# Patient Record
Sex: FEMALE | Race: OTHER | HISPANIC OR LATINO | URBAN - METROPOLITAN AREA
[De-identification: names, ages, dates, MRNs, and addresses within clinical notes are randomized per-mention and may not be internally consistent; named-entity substitution may affect disease eponyms.]

---

## 2017-04-21 ENCOUNTER — INPATIENT (INPATIENT)
Facility: HOSPITAL | Age: 72
LOS: 2 days | Discharge: HOME | DRG: 313 | End: 2017-04-24
Attending: INTERNAL MEDICINE | Admitting: INTERNAL MEDICINE
Payer: COMMERCIAL

## 2017-04-21 VITALS
WEIGHT: 134.92 LBS | OXYGEN SATURATION: 100 % | RESPIRATION RATE: 18 BRPM | DIASTOLIC BLOOD PRESSURE: 64 MMHG | HEIGHT: 63 IN | SYSTOLIC BLOOD PRESSURE: 142 MMHG | HEART RATE: 99 BPM | TEMPERATURE: 98 F

## 2017-04-21 DIAGNOSIS — I24.9 ACUTE ISCHEMIC HEART DISEASE, UNSPECIFIED: ICD-10-CM

## 2017-04-21 DIAGNOSIS — R07.9 CHEST PAIN, UNSPECIFIED: ICD-10-CM

## 2017-04-21 DIAGNOSIS — F32.9 MAJOR DEPRESSIVE DISORDER, SINGLE EPISODE, UNSPECIFIED: ICD-10-CM

## 2017-04-21 DIAGNOSIS — I10 ESSENTIAL (PRIMARY) HYPERTENSION: ICD-10-CM

## 2017-04-21 DIAGNOSIS — E03.9 HYPOTHYROIDISM, UNSPECIFIED: ICD-10-CM

## 2017-04-21 DIAGNOSIS — Z29.9 ENCOUNTER FOR PROPHYLACTIC MEASURES, UNSPECIFIED: ICD-10-CM

## 2017-04-21 DIAGNOSIS — E11.9 TYPE 2 DIABETES MELLITUS WITHOUT COMPLICATIONS: ICD-10-CM

## 2017-04-21 LAB
ALBUMIN SERPL ELPH-MCNC: 3.5 G/DL — SIGNIFICANT CHANGE UP (ref 3.5–5)
ALP SERPL-CCNC: 115 U/L — SIGNIFICANT CHANGE UP (ref 40–120)
ALT FLD-CCNC: 27 U/L DA — SIGNIFICANT CHANGE UP (ref 10–60)
ANION GAP SERPL CALC-SCNC: 8 MMOL/L — SIGNIFICANT CHANGE UP (ref 5–17)
APTT BLD: 31.9 SEC — SIGNIFICANT CHANGE UP (ref 27.5–37.4)
AST SERPL-CCNC: 21 U/L — SIGNIFICANT CHANGE UP (ref 10–40)
BASOPHILS # BLD AUTO: 0.1 K/UL — SIGNIFICANT CHANGE UP (ref 0–0.2)
BASOPHILS NFR BLD AUTO: 1.7 % — SIGNIFICANT CHANGE UP (ref 0–2)
BILIRUB SERPL-MCNC: 0.4 MG/DL — SIGNIFICANT CHANGE UP (ref 0.2–1.2)
BUN SERPL-MCNC: 13 MG/DL — SIGNIFICANT CHANGE UP (ref 7–18)
CALCIUM SERPL-MCNC: 9 MG/DL — SIGNIFICANT CHANGE UP (ref 8.4–10.5)
CHLORIDE SERPL-SCNC: 105 MMOL/L — SIGNIFICANT CHANGE UP (ref 96–108)
CK MB BLD-MCNC: 1.4 % — SIGNIFICANT CHANGE UP (ref 0–3.5)
CK MB BLD-MCNC: 1.8 % — SIGNIFICANT CHANGE UP (ref 0–3.5)
CK MB CFR SERPL CALC: 1.8 NG/ML — SIGNIFICANT CHANGE UP (ref 0–3.6)
CK MB CFR SERPL CALC: 1.8 NG/ML — SIGNIFICANT CHANGE UP (ref 0–3.6)
CK SERPL-CCNC: 133 U/L — SIGNIFICANT CHANGE UP (ref 21–215)
CK SERPL-CCNC: 98 U/L — SIGNIFICANT CHANGE UP (ref 21–215)
CO2 SERPL-SCNC: 27 MMOL/L — SIGNIFICANT CHANGE UP (ref 22–31)
CREAT SERPL-MCNC: 0.99 MG/DL — SIGNIFICANT CHANGE UP (ref 0.5–1.3)
EOSINOPHIL # BLD AUTO: 0.2 K/UL — SIGNIFICANT CHANGE UP (ref 0–0.5)
EOSINOPHIL NFR BLD AUTO: 2.6 % — SIGNIFICANT CHANGE UP (ref 0–6)
GLUCOSE SERPL-MCNC: 189 MG/DL — HIGH (ref 70–99)
HCT VFR BLD CALC: 41.5 % — SIGNIFICANT CHANGE UP (ref 34.5–45)
HGB BLD-MCNC: 13.6 G/DL — SIGNIFICANT CHANGE UP (ref 11.5–15.5)
INR BLD: 0.97 RATIO — SIGNIFICANT CHANGE UP (ref 0.88–1.16)
LYMPHOCYTES # BLD AUTO: 2.2 K/UL — SIGNIFICANT CHANGE UP (ref 1–3.3)
LYMPHOCYTES # BLD AUTO: 25.8 % — SIGNIFICANT CHANGE UP (ref 13–44)
MCHC RBC-ENTMCNC: 28.8 PG — SIGNIFICANT CHANGE UP (ref 27–34)
MCHC RBC-ENTMCNC: 32.8 GM/DL — SIGNIFICANT CHANGE UP (ref 32–36)
MCV RBC AUTO: 87.8 FL — SIGNIFICANT CHANGE UP (ref 80–100)
MONOCYTES # BLD AUTO: 0.5 K/UL — SIGNIFICANT CHANGE UP (ref 0–0.9)
MONOCYTES NFR BLD AUTO: 5.6 % — SIGNIFICANT CHANGE UP (ref 2–14)
NEUTROPHILS # BLD AUTO: 5.4 K/UL — SIGNIFICANT CHANGE UP (ref 1.8–7.4)
NEUTROPHILS NFR BLD AUTO: 64.3 % — SIGNIFICANT CHANGE UP (ref 43–77)
PLATELET # BLD AUTO: 371 K/UL — SIGNIFICANT CHANGE UP (ref 150–400)
POTASSIUM SERPL-MCNC: 3.7 MMOL/L — SIGNIFICANT CHANGE UP (ref 3.5–5.3)
POTASSIUM SERPL-SCNC: 3.7 MMOL/L — SIGNIFICANT CHANGE UP (ref 3.5–5.3)
PROT SERPL-MCNC: 7.3 G/DL — SIGNIFICANT CHANGE UP (ref 6–8.3)
PROTHROM AB SERPL-ACNC: 10.6 SEC — SIGNIFICANT CHANGE UP (ref 9.8–12.7)
RBC # BLD: 4.72 M/UL — SIGNIFICANT CHANGE UP (ref 3.8–5.2)
RBC # FLD: 11.5 % — SIGNIFICANT CHANGE UP (ref 10.3–14.5)
SODIUM SERPL-SCNC: 140 MMOL/L — SIGNIFICANT CHANGE UP (ref 135–145)
TROPONIN I SERPL-MCNC: <0.015 NG/ML — SIGNIFICANT CHANGE UP (ref 0–0.04)
TROPONIN I SERPL-MCNC: <0.015 NG/ML — SIGNIFICANT CHANGE UP (ref 0–0.04)
WBC # BLD: 8.5 K/UL — SIGNIFICANT CHANGE UP (ref 3.8–10.5)
WBC # FLD AUTO: 8.5 K/UL — SIGNIFICANT CHANGE UP (ref 3.8–10.5)

## 2017-04-21 PROCEDURE — 71020: CPT | Mod: 26

## 2017-04-21 PROCEDURE — 99285 EMERGENCY DEPT VISIT HI MDM: CPT

## 2017-04-21 RX ORDER — INSULIN LISPRO 100/ML
VIAL (ML) SUBCUTANEOUS
Qty: 0 | Refills: 0 | Status: DISCONTINUED | OUTPATIENT
Start: 2017-04-21 | End: 2017-04-24

## 2017-04-21 RX ORDER — ASPIRIN/CALCIUM CARB/MAGNESIUM 324 MG
81 TABLET ORAL DAILY
Qty: 0 | Refills: 0 | Status: DISCONTINUED | OUTPATIENT
Start: 2017-04-21 | End: 2017-04-24

## 2017-04-21 RX ORDER — AMLODIPINE BESYLATE 2.5 MG/1
5 TABLET ORAL DAILY
Qty: 0 | Refills: 0 | Status: DISCONTINUED | OUTPATIENT
Start: 2017-04-21 | End: 2017-04-24

## 2017-04-21 RX ORDER — DEXTROSE 50 % IN WATER 50 %
1 SYRINGE (ML) INTRAVENOUS ONCE
Qty: 0 | Refills: 0 | Status: DISCONTINUED | OUTPATIENT
Start: 2017-04-21 | End: 2017-04-24

## 2017-04-21 RX ORDER — LEVOTHYROXINE SODIUM 125 MCG
100 TABLET ORAL DAILY
Qty: 0 | Refills: 0 | Status: DISCONTINUED | OUTPATIENT
Start: 2017-04-21 | End: 2017-04-24

## 2017-04-21 RX ORDER — INSULIN GLARGINE 100 [IU]/ML
10 INJECTION, SOLUTION SUBCUTANEOUS AT BEDTIME
Qty: 0 | Refills: 0 | Status: DISCONTINUED | OUTPATIENT
Start: 2017-04-21 | End: 2017-04-23

## 2017-04-21 RX ORDER — DEXTROSE 50 % IN WATER 50 %
25 SYRINGE (ML) INTRAVENOUS ONCE
Qty: 0 | Refills: 0 | Status: DISCONTINUED | OUTPATIENT
Start: 2017-04-21 | End: 2017-04-24

## 2017-04-21 RX ORDER — DEXTROSE 50 % IN WATER 50 %
12.5 SYRINGE (ML) INTRAVENOUS ONCE
Qty: 0 | Refills: 0 | Status: DISCONTINUED | OUTPATIENT
Start: 2017-04-21 | End: 2017-04-24

## 2017-04-21 RX ORDER — ENOXAPARIN SODIUM 100 MG/ML
40 INJECTION SUBCUTANEOUS DAILY
Qty: 0 | Refills: 0 | Status: DISCONTINUED | OUTPATIENT
Start: 2017-04-21 | End: 2017-04-24

## 2017-04-21 RX ORDER — SODIUM CHLORIDE 9 MG/ML
1000 INJECTION, SOLUTION INTRAVENOUS
Qty: 0 | Refills: 0 | Status: DISCONTINUED | OUTPATIENT
Start: 2017-04-21 | End: 2017-04-24

## 2017-04-21 RX ORDER — GLUCAGON INJECTION, SOLUTION 0.5 MG/.1ML
1 INJECTION, SOLUTION SUBCUTANEOUS ONCE
Qty: 0 | Refills: 0 | Status: DISCONTINUED | OUTPATIENT
Start: 2017-04-21 | End: 2017-04-24

## 2017-04-21 RX ORDER — LOSARTAN POTASSIUM 100 MG/1
100 TABLET, FILM COATED ORAL DAILY
Qty: 0 | Refills: 0 | Status: DISCONTINUED | OUTPATIENT
Start: 2017-04-21 | End: 2017-04-24

## 2017-04-21 RX ORDER — ATORVASTATIN CALCIUM 80 MG/1
40 TABLET, FILM COATED ORAL AT BEDTIME
Qty: 0 | Refills: 0 | Status: DISCONTINUED | OUTPATIENT
Start: 2017-04-21 | End: 2017-04-24

## 2017-04-21 RX ORDER — ACETAMINOPHEN 500 MG
650 TABLET ORAL EVERY 6 HOURS
Qty: 0 | Refills: 0 | Status: DISCONTINUED | OUTPATIENT
Start: 2017-04-21 | End: 2017-04-24

## 2017-04-21 RX ORDER — PANTOPRAZOLE SODIUM 20 MG/1
40 TABLET, DELAYED RELEASE ORAL
Qty: 0 | Refills: 0 | Status: DISCONTINUED | OUTPATIENT
Start: 2017-04-21 | End: 2017-04-24

## 2017-04-21 RX ORDER — SERTRALINE 25 MG/1
25 TABLET, FILM COATED ORAL DAILY
Qty: 0 | Refills: 0 | Status: DISCONTINUED | OUTPATIENT
Start: 2017-04-21 | End: 2017-04-24

## 2017-04-21 RX ADMIN — INSULIN GLARGINE 10 UNIT(S): 100 INJECTION, SOLUTION SUBCUTANEOUS at 23:15

## 2017-04-21 RX ADMIN — ATORVASTATIN CALCIUM 40 MILLIGRAM(S): 80 TABLET, FILM COATED ORAL at 23:15

## 2017-04-21 RX ADMIN — Medication 650 MILLIGRAM(S): at 20:56

## 2017-04-21 RX ADMIN — Medication 650 MILLIGRAM(S): at 21:52

## 2017-04-21 RX ADMIN — Medication 81 MILLIGRAM(S): at 12:18

## 2017-04-21 NOTE — H&P ADULT. - ATTENDING COMMENTS
73 Yo female with  PMH of HTN, DM II , hyperlipidemia, hypothyroidism and depression presents to the ED with c/o pain in her left arm since this morning. Pain is in her left hand and it radiates to her left arm, left shoulder and chest. Patient woke up this morning due to pain , 10/10 in intensity, sharp in quality, not relieved or aggravated by anything. Associated with diaphoresis and headache. Patient denies nausea, vomiting or, dyspnea, GERD, recent upper respiratory tract infection or recent aspirin use. Patient had a vasovagal syncope 3 days ago after urinating, denies head trauma. former smoker, former ETOH use. Eats a healthy diet, exercises everyday by walking at least an hour.  Patient's   3 years ago and she has been on Sertraline off and on since then for Depression. Last visit to her Psychiatrist was yesterday  Family history of heart disease in her Father.    pt seen in bed, a+o x3, nad, vitals stable, physical exam reveals no focal motor deficit, clear lungs, regular s1s2, abd soft nd nt bs+, ext no edema. labs and diagnostic test result reviewed.    assessment   --- chest pain,  r/o acs, h/o HTN, DM II , hyperlipidemia, hypothyroidism and depression    plan  --  adm to tele, acs protocol, lopressor, aspirin, statin, cont preadmit home meds, gi and dvt profilaxis  cbc, bmp, mg, phos, lipid, tsh, ce q8 x3    echo    cardio cons

## 2017-04-21 NOTE — H&P ADULT. - GASTROINTESTINAL DETAILS
normal/no rebound tenderness/soft/nontender/no guarding/no distention/bowel sounds normal/no bruit/no masses palpable/no rigidity/no organomegaly

## 2017-04-21 NOTE — ED ADULT NURSE NOTE - OBJECTIVE STATEMENT
Patient complains of left arm pain, rates pain 10/10, chest pain 3/10. Patient is non diaphoretic, breathing easy and unlabored, speaking in full sentences, no use of accessory muscles. Moving all extremities. EKG completed.

## 2017-04-21 NOTE — ED ADULT NURSE REASSESSMENT NOTE - NS ED NURSE REASSESS COMMENT FT1
Patient tolerated meal well. Family at bedside. Remains alert, responsive, resting in stretcher, on tele box J. Awaiting inpatient bed.

## 2017-04-21 NOTE — H&P ADULT. - PROBLEM SELECTOR PLAN 3
c/w home med amlodipine 5 mg daily   c/w home med losartan 100 mg daily   c/w DASH diet  Goal SBP less than 150/90 mmhg

## 2017-04-21 NOTE — H&P ADULT. - HISTORY OF PRESENT ILLNESS
73 Yo female with  PMH of HTN, DM II , hyperlipidemia, hypothyroidism and depression presents to the ED with c/o pain in her left arm since this morning. Pain is in her left hand and it radiates to her left arm, left shoulder and chest. Patient woke up this morning due to pain , 10/10 in intensity, sharp in quality, not relieved or aggravated by anything. Associated with diaphoresis and headache.Patient denies nausea, vomiting or, dyspnea, GERD, recent upper respiratory tract infection or recent aspirin use. Patient had a vasovagal syncope 3 days ago after urinating, denies head trauma. former smoker, former ETOH use. Eats a healthy diet, exercises everyday by walking at least an hour.  Patient's   3 years ago and she has been on Sertraline off and on since then for Depression. Last visit to her Psychiatrist was yesterday  Family history of heart disease in her Father. 73 Yo female with  PMH of HTN, DM II , hyperlipidemia, hypothyroidism and depression presents to the ED with c/o pain in her left arm since this morning. Pain is in her left hand and it radiates to her left arm, left shoulder and chest. Patient woke up this morning due to pain , 10/10 in intensity, sharp in quality, not relieved or aggravated by anything. Associated with diaphoresis and headache. Patient denies nausea, vomiting or, dyspnea, GERD, recent upper respiratory tract infection or recent aspirin use. Patient had a vasovagal syncope 3 days ago after urinating, denies head trauma. former smoker, former ETOH use. Eats a healthy diet, exercises everyday by walking at least an hour.  Patient's   3 years ago and she has been on Sertraline off and on since then for Depression. Last visit to her Psychiatrist was yesterday  Family history of heart disease in her Father.

## 2017-04-21 NOTE — H&P ADULT. - PROBLEM SELECTOR PLAN 4
Pt takes metformin and glimepiride at home   holding PO DM meds   c/w HSS + lantus 10 u while holding PO DM meds  f/u HbA1c

## 2017-04-21 NOTE — H&P ADULT. - NEUROLOGICAL DETAILS
sensation intact/alert and oriented x 3/responds to verbal commands/cranial nerves intact/no spontaneous movement/responds to pain/deep reflexes intact/superficial reflexes intact/normal strength

## 2017-04-21 NOTE — H&P ADULT. - RS GEN PE MLT RESP DETAILS PC
clear to auscultation bilaterally/no subcutaneous emphysema/respirations non-labored/normal/good air movement/airway patent/no rales/no intercostal retractions/breath sounds equal/no wheezes/no chest wall tenderness/no rhonchi

## 2017-04-21 NOTE — PATIENT PROFILE ADULT. - LANGUAGE ASSISTANCE NEEDED
No-Patient/Caregiver offered and refused free interpretation services./pt understands and able to make needs known in English

## 2017-04-21 NOTE — H&P ADULT. - ASSESSMENT
71 Yo female with  PMH of HTN, DM II , hyperlipidemia, hypothyroidism and depression presents to the ED with c/o pain in her left arm since this morning. Pain is in her left hand and it radiates to her left arm, left shoulder and chest. Patient woke up this morning due to pain , 10/10 in intensity, sharp in quality, not relieved or aggravated by anything. Associated with diaphoresis and headache. Patient denies nausea, vomiting or, dyspnea, GERD, recent upper respiratory tract infection or recent aspirin use. Patient had a vasovagal syncope 3 days ago after urinating, denies head trauma. former smoker, former ETOH use. Eats a healthy diet, exercises everyday by walking at least an hour.  Patient's   3 years ago and she has been on Sertraline off and on since then for Depression. Last visit to her Psychiatrist was yesterday  Family history of heart disease in her Father.

## 2017-04-21 NOTE — ED PROVIDER NOTE - OBJECTIVE STATEMENT
+left sternal chest apain with radiation to left shoulder started at 7am. No shortness of breath, no fever, no vomiting.

## 2017-04-21 NOTE — ED PROVIDER NOTE - MEDICAL DECISION MAKING DETAILS
Cardiac risk factors: age, HTN, DM . MAR and Dr. Mayen endorsed. Pt agrees with admission. I had a detailed discussion with the patient and/or guardian regarding the historical points, exam findings, and any diagnostic results supporting the admit diagnosis.

## 2017-04-21 NOTE — H&P ADULT. - PROBLEM SELECTOR PLAN 1
left sided chest pain , non reproducible , no exertional   24 hour tele monitor   trend cardiac enzymes left sided chest pain , non reproducible , no exertional   24 hour tele monitor   trend cardiac enzymes, trop 1 and 2 are negative  CECIL score - 2  CXR - clear , No GERD sx , normotensive , No JVD  f/u TSH, HbA1c , Lipid profile   f/u echo  c/w ASA , Lipitor , start BB after U-tox negative for cocaine , though pt denies of any cocaine use

## 2017-04-22 LAB
24R-OH-CALCIDIOL SERPL-MCNC: 23.1 NG/ML — LOW (ref 30–100)
AMPHET UR-MCNC: NEGATIVE — SIGNIFICANT CHANGE UP
ANION GAP SERPL CALC-SCNC: 7 MMOL/L — SIGNIFICANT CHANGE UP (ref 5–17)
APPEARANCE UR: CLEAR — SIGNIFICANT CHANGE UP
BARBITURATES UR SCN-MCNC: NEGATIVE — SIGNIFICANT CHANGE UP
BASOPHILS # BLD AUTO: 0.1 K/UL — SIGNIFICANT CHANGE UP (ref 0–0.2)
BASOPHILS NFR BLD AUTO: 1.8 % — SIGNIFICANT CHANGE UP (ref 0–2)
BENZODIAZ UR-MCNC: NEGATIVE — SIGNIFICANT CHANGE UP
BILIRUB UR-MCNC: NEGATIVE — SIGNIFICANT CHANGE UP
BUN SERPL-MCNC: 13 MG/DL — SIGNIFICANT CHANGE UP (ref 7–18)
CALCIUM SERPL-MCNC: 8.7 MG/DL — SIGNIFICANT CHANGE UP (ref 8.4–10.5)
CHLORIDE SERPL-SCNC: 106 MMOL/L — SIGNIFICANT CHANGE UP (ref 96–108)
CHOLEST SERPL-MCNC: 211 MG/DL — HIGH (ref 10–199)
CK MB BLD-MCNC: <1.2 % — SIGNIFICANT CHANGE UP (ref 0–3.5)
CK MB CFR SERPL CALC: <1 NG/ML — SIGNIFICANT CHANGE UP (ref 0–3.6)
CK SERPL-CCNC: 83 U/L — SIGNIFICANT CHANGE UP (ref 21–215)
CO2 SERPL-SCNC: 29 MMOL/L — SIGNIFICANT CHANGE UP (ref 22–31)
COCAINE METAB.OTHER UR-MCNC: NEGATIVE — SIGNIFICANT CHANGE UP
COLOR SPEC: YELLOW — SIGNIFICANT CHANGE UP
CREAT SERPL-MCNC: 0.9 MG/DL — SIGNIFICANT CHANGE UP (ref 0.5–1.3)
DIFF PNL FLD: ABNORMAL
EOSINOPHIL # BLD AUTO: 0.2 K/UL — SIGNIFICANT CHANGE UP (ref 0–0.5)
EOSINOPHIL NFR BLD AUTO: 3.2 % — SIGNIFICANT CHANGE UP (ref 0–6)
FOLATE SERPL-MCNC: 12.2 NG/ML — SIGNIFICANT CHANGE UP (ref 4.8–24.2)
GLUCOSE SERPL-MCNC: 118 MG/DL — HIGH (ref 70–99)
GLUCOSE UR QL: NEGATIVE — SIGNIFICANT CHANGE UP
HBA1C BLD-MCNC: 8.1 % — HIGH (ref 4–5.6)
HBA1C BLD-MCNC: 8.4 % — HIGH (ref 4–5.6)
HCT VFR BLD CALC: 39 % — SIGNIFICANT CHANGE UP (ref 34.5–45)
HDLC SERPL-MCNC: 39 MG/DL — LOW (ref 40–125)
HGB BLD-MCNC: 13.3 G/DL — SIGNIFICANT CHANGE UP (ref 11.5–15.5)
KETONES UR-MCNC: NEGATIVE — SIGNIFICANT CHANGE UP
LEUKOCYTE ESTERASE UR-ACNC: ABNORMAL
LIPID PNL WITH DIRECT LDL SERPL: 144 MG/DL — SIGNIFICANT CHANGE UP
LYMPHOCYTES # BLD AUTO: 2.4 K/UL — SIGNIFICANT CHANGE UP (ref 1–3.3)
LYMPHOCYTES # BLD AUTO: 32.2 % — SIGNIFICANT CHANGE UP (ref 13–44)
MAGNESIUM SERPL-MCNC: 1.7 MG/DL — LOW (ref 1.8–2.4)
MCHC RBC-ENTMCNC: 29.3 PG — SIGNIFICANT CHANGE UP (ref 27–34)
MCHC RBC-ENTMCNC: 34 GM/DL — SIGNIFICANT CHANGE UP (ref 32–36)
MCV RBC AUTO: 86.4 FL — SIGNIFICANT CHANGE UP (ref 80–100)
METHADONE UR-MCNC: NEGATIVE — SIGNIFICANT CHANGE UP
MONOCYTES # BLD AUTO: 0.5 K/UL — SIGNIFICANT CHANGE UP (ref 0–0.9)
MONOCYTES NFR BLD AUTO: 7.3 % — SIGNIFICANT CHANGE UP (ref 2–14)
NEUTROPHILS # BLD AUTO: 4.1 K/UL — SIGNIFICANT CHANGE UP (ref 1.8–7.4)
NEUTROPHILS NFR BLD AUTO: 55.5 % — SIGNIFICANT CHANGE UP (ref 43–77)
NITRITE UR-MCNC: NEGATIVE — SIGNIFICANT CHANGE UP
OPIATES UR-MCNC: NEGATIVE — SIGNIFICANT CHANGE UP
PCP SPEC-MCNC: SIGNIFICANT CHANGE UP
PCP UR-MCNC: NEGATIVE — SIGNIFICANT CHANGE UP
PH UR: 7 — SIGNIFICANT CHANGE UP (ref 5–8)
PHOSPHATE SERPL-MCNC: 3.2 MG/DL — SIGNIFICANT CHANGE UP (ref 2.5–4.5)
PLATELET # BLD AUTO: 335 K/UL — SIGNIFICANT CHANGE UP (ref 150–400)
POTASSIUM SERPL-MCNC: 3.4 MMOL/L — LOW (ref 3.5–5.3)
POTASSIUM SERPL-SCNC: 3.4 MMOL/L — LOW (ref 3.5–5.3)
PROT UR-MCNC: 15
RBC # BLD: 4.52 M/UL — SIGNIFICANT CHANGE UP (ref 3.8–5.2)
RBC # FLD: 11.5 % — SIGNIFICANT CHANGE UP (ref 10.3–14.5)
SODIUM SERPL-SCNC: 142 MMOL/L — SIGNIFICANT CHANGE UP (ref 135–145)
SP GR SPEC: 1.01 — SIGNIFICANT CHANGE UP (ref 1.01–1.02)
THC UR QL: NEGATIVE — SIGNIFICANT CHANGE UP
TOTAL CHOLESTEROL/HDL RATIO MEASUREMENT: 5.4 RATIO — SIGNIFICANT CHANGE UP (ref 3.3–7.1)
TRIGL SERPL-MCNC: 139 MG/DL — SIGNIFICANT CHANGE UP (ref 10–149)
TROPONIN I SERPL-MCNC: <0.015 NG/ML — SIGNIFICANT CHANGE UP (ref 0–0.04)
TSH SERPL-MCNC: 2.14 UU/ML — SIGNIFICANT CHANGE UP (ref 0.34–4.82)
UROBILINOGEN FLD QL: NEGATIVE — SIGNIFICANT CHANGE UP
VIT B12 SERPL-MCNC: 561 PG/ML — SIGNIFICANT CHANGE UP (ref 243–894)
WBC # BLD: 7.4 K/UL — SIGNIFICANT CHANGE UP (ref 3.8–10.5)
WBC # FLD AUTO: 7.4 K/UL — SIGNIFICANT CHANGE UP (ref 3.8–10.5)

## 2017-04-22 RX ORDER — CEFTRIAXONE 500 MG/1
1 INJECTION, POWDER, FOR SOLUTION INTRAMUSCULAR; INTRAVENOUS ONCE
Qty: 0 | Refills: 0 | Status: COMPLETED | OUTPATIENT
Start: 2017-04-22 | End: 2017-04-22

## 2017-04-22 RX ORDER — MAGNESIUM SULFATE 500 MG/ML
1 VIAL (ML) INJECTION ONCE
Qty: 0 | Refills: 0 | Status: COMPLETED | OUTPATIENT
Start: 2017-04-22 | End: 2017-04-22

## 2017-04-22 RX ORDER — METOPROLOL TARTRATE 50 MG
12.5 TABLET ORAL
Qty: 0 | Refills: 0 | Status: DISCONTINUED | OUTPATIENT
Start: 2017-04-22 | End: 2017-04-24

## 2017-04-22 RX ORDER — CEFTRIAXONE 500 MG/1
1 INJECTION, POWDER, FOR SOLUTION INTRAMUSCULAR; INTRAVENOUS EVERY 24 HOURS
Qty: 0 | Refills: 0 | Status: DISCONTINUED | OUTPATIENT
Start: 2017-04-23 | End: 2017-04-24

## 2017-04-22 RX ORDER — CEFTRIAXONE 500 MG/1
INJECTION, POWDER, FOR SOLUTION INTRAMUSCULAR; INTRAVENOUS
Qty: 0 | Refills: 0 | Status: DISCONTINUED | OUTPATIENT
Start: 2017-04-22 | End: 2017-04-24

## 2017-04-22 RX ORDER — POTASSIUM CHLORIDE 20 MEQ
40 PACKET (EA) ORAL
Qty: 0 | Refills: 0 | Status: COMPLETED | OUTPATIENT
Start: 2017-04-22 | End: 2017-04-22

## 2017-04-22 RX ORDER — ZOLPIDEM TARTRATE 10 MG/1
5 TABLET ORAL ONCE
Qty: 0 | Refills: 0 | Status: DISCONTINUED | OUTPATIENT
Start: 2017-04-22 | End: 2017-04-22

## 2017-04-22 RX ADMIN — AMLODIPINE BESYLATE 5 MILLIGRAM(S): 2.5 TABLET ORAL at 06:47

## 2017-04-22 RX ADMIN — Medication 650 MILLIGRAM(S): at 19:44

## 2017-04-22 RX ADMIN — SERTRALINE 25 MILLIGRAM(S): 25 TABLET, FILM COATED ORAL at 13:31

## 2017-04-22 RX ADMIN — PANTOPRAZOLE SODIUM 40 MILLIGRAM(S): 20 TABLET, DELAYED RELEASE ORAL at 06:47

## 2017-04-22 RX ADMIN — ENOXAPARIN SODIUM 40 MILLIGRAM(S): 100 INJECTION SUBCUTANEOUS at 12:37

## 2017-04-22 RX ADMIN — Medication 12.5 MILLIGRAM(S): at 18:12

## 2017-04-22 RX ADMIN — Medication 650 MILLIGRAM(S): at 20:46

## 2017-04-22 RX ADMIN — Medication 40 MILLIEQUIVALENT(S): at 13:31

## 2017-04-22 RX ADMIN — Medication 81 MILLIGRAM(S): at 12:37

## 2017-04-22 RX ADMIN — Medication 1: at 16:49

## 2017-04-22 RX ADMIN — ZOLPIDEM TARTRATE 5 MILLIGRAM(S): 10 TABLET ORAL at 23:02

## 2017-04-22 RX ADMIN — ATORVASTATIN CALCIUM 40 MILLIGRAM(S): 80 TABLET, FILM COATED ORAL at 21:54

## 2017-04-22 RX ADMIN — CEFTRIAXONE 100 GRAM(S): 500 INJECTION, POWDER, FOR SOLUTION INTRAMUSCULAR; INTRAVENOUS at 09:00

## 2017-04-22 RX ADMIN — Medication 100 MICROGRAM(S): at 06:48

## 2017-04-22 RX ADMIN — Medication 2: at 12:39

## 2017-04-22 RX ADMIN — Medication 40 MILLIEQUIVALENT(S): at 09:00

## 2017-04-22 RX ADMIN — INSULIN GLARGINE 10 UNIT(S): 100 INJECTION, SOLUTION SUBCUTANEOUS at 21:54

## 2017-04-22 RX ADMIN — Medication 100 GRAM(S): at 09:00

## 2017-04-22 RX ADMIN — LOSARTAN POTASSIUM 100 MILLIGRAM(S): 100 TABLET, FILM COATED ORAL at 06:47

## 2017-04-23 LAB
ANION GAP SERPL CALC-SCNC: 5 MMOL/L — SIGNIFICANT CHANGE UP (ref 5–17)
BASOPHILS # BLD AUTO: 0.1 K/UL — SIGNIFICANT CHANGE UP (ref 0–0.2)
BASOPHILS NFR BLD AUTO: 1.2 % — SIGNIFICANT CHANGE UP (ref 0–2)
BUN SERPL-MCNC: 16 MG/DL — SIGNIFICANT CHANGE UP (ref 7–18)
CALCIUM SERPL-MCNC: 8.4 MG/DL — SIGNIFICANT CHANGE UP (ref 8.4–10.5)
CHLORIDE SERPL-SCNC: 108 MMOL/L — SIGNIFICANT CHANGE UP (ref 96–108)
CO2 SERPL-SCNC: 29 MMOL/L — SIGNIFICANT CHANGE UP (ref 22–31)
CREAT SERPL-MCNC: 0.96 MG/DL — SIGNIFICANT CHANGE UP (ref 0.5–1.3)
EOSINOPHIL # BLD AUTO: 0.3 K/UL — SIGNIFICANT CHANGE UP (ref 0–0.5)
EOSINOPHIL NFR BLD AUTO: 3.8 % — SIGNIFICANT CHANGE UP (ref 0–6)
GLUCOSE SERPL-MCNC: 143 MG/DL — HIGH (ref 70–99)
HCT VFR BLD CALC: 40.1 % — SIGNIFICANT CHANGE UP (ref 34.5–45)
HGB BLD-MCNC: 13.4 G/DL — SIGNIFICANT CHANGE UP (ref 11.5–15.5)
LYMPHOCYTES # BLD AUTO: 2.2 K/UL — SIGNIFICANT CHANGE UP (ref 1–3.3)
LYMPHOCYTES # BLD AUTO: 29.6 % — SIGNIFICANT CHANGE UP (ref 13–44)
MAGNESIUM SERPL-MCNC: 2 MG/DL — SIGNIFICANT CHANGE UP (ref 1.8–2.4)
MCHC RBC-ENTMCNC: 29.4 PG — SIGNIFICANT CHANGE UP (ref 27–34)
MCHC RBC-ENTMCNC: 33.4 GM/DL — SIGNIFICANT CHANGE UP (ref 32–36)
MCV RBC AUTO: 88.1 FL — SIGNIFICANT CHANGE UP (ref 80–100)
MONOCYTES # BLD AUTO: 0.5 K/UL — SIGNIFICANT CHANGE UP (ref 0–0.9)
MONOCYTES NFR BLD AUTO: 6.7 % — SIGNIFICANT CHANGE UP (ref 2–14)
NEUTROPHILS # BLD AUTO: 4.3 K/UL — SIGNIFICANT CHANGE UP (ref 1.8–7.4)
NEUTROPHILS NFR BLD AUTO: 58.6 % — SIGNIFICANT CHANGE UP (ref 43–77)
PHOSPHATE SERPL-MCNC: 2.9 MG/DL — SIGNIFICANT CHANGE UP (ref 2.5–4.5)
PLATELET # BLD AUTO: 350 K/UL — SIGNIFICANT CHANGE UP (ref 150–400)
POTASSIUM SERPL-MCNC: 4.2 MMOL/L — SIGNIFICANT CHANGE UP (ref 3.5–5.3)
POTASSIUM SERPL-SCNC: 4.2 MMOL/L — SIGNIFICANT CHANGE UP (ref 3.5–5.3)
RBC # BLD: 4.55 M/UL — SIGNIFICANT CHANGE UP (ref 3.8–5.2)
RBC # FLD: 11.8 % — SIGNIFICANT CHANGE UP (ref 10.3–14.5)
SODIUM SERPL-SCNC: 142 MMOL/L — SIGNIFICANT CHANGE UP (ref 135–145)
WBC # BLD: 7.3 K/UL — SIGNIFICANT CHANGE UP (ref 3.8–10.5)
WBC # FLD AUTO: 7.3 K/UL — SIGNIFICANT CHANGE UP (ref 3.8–10.5)

## 2017-04-23 RX ORDER — ASPIRIN/CALCIUM CARB/MAGNESIUM 324 MG
1 TABLET ORAL
Qty: 0 | Refills: 0 | COMMUNITY
Start: 2017-04-23

## 2017-04-23 RX ORDER — ERGOCALCIFEROL 1.25 MG/1
50000 CAPSULE ORAL
Qty: 0 | Refills: 0 | Status: DISCONTINUED | OUTPATIENT
Start: 2017-04-23 | End: 2017-04-24

## 2017-04-23 RX ORDER — ZOLPIDEM TARTRATE 10 MG/1
5 TABLET ORAL ONCE
Qty: 0 | Refills: 0 | Status: DISCONTINUED | OUTPATIENT
Start: 2017-04-23 | End: 2017-04-23

## 2017-04-23 RX ORDER — INSULIN GLARGINE 100 [IU]/ML
12 INJECTION, SOLUTION SUBCUTANEOUS AT BEDTIME
Qty: 0 | Refills: 0 | Status: DISCONTINUED | OUTPATIENT
Start: 2017-04-23 | End: 2017-04-24

## 2017-04-23 RX ORDER — ERGOCALCIFEROL 1.25 MG/1
1 CAPSULE ORAL
Qty: 4 | Refills: 0 | OUTPATIENT
Start: 2017-04-23 | End: 2017-05-23

## 2017-04-23 RX ADMIN — CEFTRIAXONE 100 GRAM(S): 500 INJECTION, POWDER, FOR SOLUTION INTRAMUSCULAR; INTRAVENOUS at 08:39

## 2017-04-23 RX ADMIN — SERTRALINE 25 MILLIGRAM(S): 25 TABLET, FILM COATED ORAL at 12:50

## 2017-04-23 RX ADMIN — ZOLPIDEM TARTRATE 5 MILLIGRAM(S): 10 TABLET ORAL at 23:24

## 2017-04-23 RX ADMIN — ENOXAPARIN SODIUM 40 MILLIGRAM(S): 100 INJECTION SUBCUTANEOUS at 12:50

## 2017-04-23 RX ADMIN — Medication 2: at 12:49

## 2017-04-23 RX ADMIN — Medication 12.5 MILLIGRAM(S): at 17:17

## 2017-04-23 RX ADMIN — Medication 81 MILLIGRAM(S): at 12:50

## 2017-04-23 RX ADMIN — ERGOCALCIFEROL 50000 UNIT(S): 1.25 CAPSULE ORAL at 12:50

## 2017-04-23 RX ADMIN — AMLODIPINE BESYLATE 5 MILLIGRAM(S): 2.5 TABLET ORAL at 05:53

## 2017-04-23 RX ADMIN — ATORVASTATIN CALCIUM 40 MILLIGRAM(S): 80 TABLET, FILM COATED ORAL at 21:21

## 2017-04-23 RX ADMIN — PANTOPRAZOLE SODIUM 40 MILLIGRAM(S): 20 TABLET, DELAYED RELEASE ORAL at 06:07

## 2017-04-23 RX ADMIN — INSULIN GLARGINE 12 UNIT(S): 100 INJECTION, SOLUTION SUBCUTANEOUS at 21:21

## 2017-04-23 RX ADMIN — Medication 12.5 MILLIGRAM(S): at 05:53

## 2017-04-23 RX ADMIN — Medication 100 MICROGRAM(S): at 05:53

## 2017-04-23 RX ADMIN — LOSARTAN POTASSIUM 100 MILLIGRAM(S): 100 TABLET, FILM COATED ORAL at 05:53

## 2017-04-23 NOTE — DISCHARGE NOTE ADULT - CARE PROVIDER_API CALL
Andrea Mayen), Internal Medicine  [address] [address] [place] [address]  Phone: [phone]  Fax: [phone]

## 2017-04-23 NOTE — DISCHARGE NOTE ADULT - CARE PLAN
Principal Discharge DX:	Chest pain  Secondary Diagnosis:	Hypothyroidism  Secondary Diagnosis:	Hypertension  Secondary Diagnosis:	Diabetes  Secondary Diagnosis:	Depression  Secondary Diagnosis:	UTI (urinary tract infection) Principal Discharge DX:	Chest pain  Goal:	prevent the chest pain  Instructions for follow-up, activity and diet:	You worked up for any acute coronary events, ACS ruled out, your ECHO normal EF 55-60%, garde 1 diastolic dysfunction. given aspirin, statin, metoprolol low dose . Stress test shows ----------------------. Patient stable to be discharged , f/up outpatient with PCP .  Secondary Diagnosis:	Hypothyroidism  Goal:	keep the TSH levels within range  Instructions for follow-up, activity and diet:	Continue with synthroid at home dose, TSH wnl.  Secondary Diagnosis:	Hypertension  Goal:	keep the BP around 140/90  Instructions for follow-up, activity and diet:	Home medications continued, BP slightly elevated , metoprolol increased to 25 BID. Take low sodium diet. F/up with PCP for further recommendations.  Secondary Diagnosis:	Diabetes  Goal:	keep the fingersticks around 140-150  Instructions for follow-up, activity and diet:	Your oral home meds kept on hold, HSS given , HBA1c is 8. You can take the home medications. F/up with PCP, take low CHO diet  Secondary Diagnosis:	Depression  Goal:	provide the supportive care  Instructions for follow-up, activity and diet:	Continue with home meds  Secondary Diagnosis:	UTI (urinary tract infection)  Goal:	treat the infection  Instructions for follow-up, activity and diet:	You found to have UTI, treated with rocephin x 4 days, take ceftin 250 mg BID x 3 more days to complete total of 7 day course. Urine culture shows [id number] E coli which is preliminary result, full sensitivity not available upon discharge, f/up with PCP for further recommendations. Principal Discharge DX:	Chest pain  Goal:	prevent the chest pain  Instructions for follow-up, activity and diet:	You worked up for any acute coronary events, ACS ruled out, your ECHO normal EF 55-60%, grade 1 diastolic dysfunction. given aspirin, statin, metoprolol low dose . Stress test shows normal results . Patient stable to be discharged , f/up outpatient with PCP .  Secondary Diagnosis:	Hypothyroidism  Goal:	keep the TSH levels within range  Instructions for follow-up, activity and diet:	Continue with synthroid at home dose, TSH wnl.  Secondary Diagnosis:	Hypertension  Goal:	keep the BP around 140/90  Instructions for follow-up, activity and diet:	Home medications continued, BP slightly elevated , metoprolol increased to 25 BID. Take low sodium diet. F/up with PCP for further recommendations.  Secondary Diagnosis:	Diabetes  Goal:	keep the fingersticks around 140-150  Instructions for follow-up, activity and diet:	Your oral home meds kept on hold, HSS given , HBA1c is 8. You can take the home medications. F/up with PCP, take low CHO diet  Secondary Diagnosis:	Depression  Goal:	provide the supportive care  Instructions for follow-up, activity and diet:	Continue with home meds  Secondary Diagnosis:	UTI (urinary tract infection)  Goal:	treat the infection  Instructions for follow-up, activity and diet:	You found to have UTI, treated with rocephin x 4 days, take ceftin 250 mg BID x 3 more days to complete total of 7 day course. Urine culture shows [id number] E coli which is preliminary result, full sensitivity not available upon discharge, f/up with PCP for further recommendations. Principal Discharge DX:	Chest pain  Goal:	prevent the chest pain  Instructions for follow-up, activity and diet:	You worked up for any acute coronary events, ACS ruled out, your ECHO normal EF 55-60%, grade 1 diastolic dysfunction. given aspirin, statin, metoprolol low dose . Stress test shows normal results . Patient stable to be discharged , f/up outpatient with PCP .  Secondary Diagnosis:	Hypothyroidism  Goal:	keep the TSH levels within range  Instructions for follow-up, activity and diet:	Continue with synthroid at home dose, TSH wnl.  Secondary Diagnosis:	Hypertension  Goal:	keep the BP around 140/90  Instructions for follow-up, activity and diet:	Home medications continued, BP slightly elevated , metoprolol increased to 25 BID. Take low sodium diet. F/up with PCP for further recommendations.  Secondary Diagnosis:	Diabetes  Goal:	keep the fingersticks around 140-150  Instructions for follow-up, activity and diet:	Your oral home meds kept on hold, HSS given , HBA1c is 8. You can take the home medications. F/up with PCP, take low CHO diet  Secondary Diagnosis:	Depression  Goal:	provide the supportive care  Instructions for follow-up, activity and diet:	Continue with home meds  Secondary Diagnosis:	UTI (urinary tract infection)  Goal:	treat the infection  Instructions for follow-up, activity and diet:	You found to have UTI, treated with rocephin x 4 days, take ceftin 250 mg BID x 3 more days to complete total of 7 day course. Urine culture shows [id number] E coli which is preliminary result, full sensitivity not available upon discharge, f/up with PCP for further recommendations.

## 2017-04-23 NOTE — DISCHARGE NOTE ADULT - PATIENT PORTAL LINK FT
“You can access the [facility] Patient Portal, offered by [facility], by registering with the following website: [email or web address]”

## 2017-04-23 NOTE — DISCHARGE NOTE ADULT - MEDICATION SUMMARY - MEDICATIONS TO STOP TAKING
I will STOP taking the medications listed below when I get home from the hospital:    predniSONE 20 mg oral tablet  -- 3 tab(s) by mouth once a day  -- It is very important that you take or use this exactly as directed.  Do not skip doses or discontinue unless directed by your doctor.  Obtain medical advice before taking any non-prescription drugs as some may affect the action of this medication.  Take with food or milk.    Benadryl  -- 1 cap(s) by mouth every 6 hours, As Needed- for itching . May cause dizziness or drowsiness.    Pepcid 20 mg oral tablet  -- 1 tab(s) by mouth 2 times a day  -- It is very important that you take or use this exactly as directed.  Do not skip doses or discontinue unless directed by your doctor.  Obtain medical advice before taking any non-prescription drugs as some may affect the action of this medication.

## 2017-04-23 NOTE — DISCHARGE NOTE ADULT - HOSPITAL COURSE
71 Yo female with  PMH of HTN, DM II , hyperlipidemia, hypothyroidism and depression presents to the ED with c/o pain in her left arm since this morning. Pain is in her left hand and it radiates to her left arm, left shoulder and chest. Patient woke up this morning due to pain , 10/10 in intensity, sharp in quality, not relieved or aggravated by anything. Associated with diaphoresis and headache. Patient denies nausea, vomiting or, dyspnea, GERD, recent upper respiratory tract infection or recent aspirin use. Patient had a vasovagal syncope 3 days ago after urinating, denies head trauma. former smoker, former ETOH use. Eats a healthy diet, exercises everyday by walking at least an hour.  Patient's   3 years ago and she has been on Sertraline off and on since then for Depression. Last visit to her Psychiatrist was yesterday  Family history of heart disease in her Father.    Admitted for Chest pain,  left sided chest pain , non reproducible , no exertional . 24 hour tele monitoring with no acute events recorded. Troponins x 3 negative. Given aspirin, statin, as per ACS protocol.  CXR - clear , No GERD sx , normotensive , No JVD. Rouit[place] labs  TSH, HbA1c , Lipid profile ( cholesterol 211)  wnl. Given aspirin, statin, metoprolol as per ACS protocol. ACS ruled out. Echo shows mild left atrial enlargement. Mild concentric left ventricular hypertrophy. Normal Left Ventricular Systolic Function,  (EF = 55 to 60%). Grade I diastolic dysfunction, Stress test --------------------------. For Hypothyroidism continued with levothyroxine 100 mcg daily home dose . For Hypertension,  home med amlodipine 5 mg,  losartan 100 mg continued. BP controlled. For diabetes holded home pO meds, HSS scale given. HBA1c is 8. Added lantus for better coverage. For Depression  home med sertraline continued. For prophylaxis lovenex continued. Patient found to have UTI given Rocephin Urine culture shows  to [id number] E coli. Given rocephin, upon discharge can take ceftin 250 BID x 3 more days.     Patient stable to be discharged to home, discussed with the attending. Outpatient f/up with PCP recommended. 71 Yo female with  PMH of HTN, DM II , hyperlipidemia, hypothyroidism and depression presents to the ED with c/o pain in her left arm since this morning. Pain is in her left hand and it radiates to her left arm, left shoulder and chest. Patient woke up this morning due to pain , 10/10 in intensity, sharp in quality, not relieved or aggravated by anything. Associated with diaphoresis and headache. Patient denies nausea, vomiting or, dyspnea, GERD, recent upper respiratory tract infection or recent aspirin use. Patient had a vasovagal syncope 3 days ago after urinating, denies head trauma. former smoker, former ETOH use. Eats a healthy diet, exercises everyday by walking at least an hour.  Patient's   3 years ago and she has been on Sertraline off and on since then for Depression. Last visit to her Psychiatrist was yesterday  Family history of heart disease in her Father.    Admitted for Chest pain,  left sided chest pain , non reproducible , no exertional . 24 hour tele monitoring with no acute events recorded. Troponins x 3 negative. Given aspirin, statin, as per ACS protocol.  CXR - clear , No GERD sx , normotensive , No JVD. [place]it[place] labs  TSH, HbA1c , Lipid profile ( cholesterol 211)  wnl. Given aspirin, statin, metoprolol as per ACS protocol. ACS ruled out. Echo shows mild left atrial enlargement. Mild concentric left ventricular hypertrophy. Normal Left Ventricular Systolic Function,  (EF = 55 to 60%). Grade I diastolic dysfunction, Stress test normal , no evidence of ischemia or infarction. -. For Hypothyroidism continued with levothyroxine 100 mcg daily home dose . For Hypertension,  home med amlodipine 5 mg,  losartan 100 mg continued. BP controlled. For diabetes holded home pO meds, HSS scale given. HBA1c is 8. Added lantus for better coverage. For Depression  home med sertraline continued. For prophylaxis Lovenox continued. Patient found to have UTI given Rocephin Urine culture shows  to [id number] E coli. Given Rocephin upon discharge can take Ceftin 250 BID x 3 more days.     Patient stable to be discharged to home, discussed with the attending. Outpatient f/up with PCP recommended.

## 2017-04-23 NOTE — DISCHARGE NOTE ADULT - PLAN OF CARE
prevent the chest pain You worked up for any acute coronary events, ACS ruled out, your ECHO normal EF 55-60%, garde 1 diastolic dysfunction. given aspirin, statin, metoprolol low dose . Stress test shows ----------------------. Patient stable to be discharged , f/up outpatient with PCP . keep the TSH levels within range Continue with synthroid at home dose, TSH wnl. keep the BP around 140/90 Home medications continued, BP slightly elevated , metoprolol increased to 25 BID. Take low sodium diet. F/up with PCP for further recommendations. keep the fingersticks around 140-150 Your oral home meds kept on hold, HSS given , HBA1c is 8. You can take the home medications. F/up with PCP, take low CHO diet provide the supportive care Continue with home meds treat the infection You found to have UTI, treated with rocephin x 4 days, take ceftin 250 mg BID x 3 more days to complete total of 7 day course. Urine culture shows [id number] E coli which is preliminary result, full sensitivity not available upon discharge, f/up with PCP for further recommendations. You worked up for any acute coronary events, ACS ruled out, your ECHO normal EF 55-60%, grade 1 diastolic dysfunction. given aspirin, statin, metoprolol low dose . Stress test shows normal results . Patient stable to be discharged , f/up outpatient with PCP .

## 2017-04-23 NOTE — DISCHARGE NOTE ADULT - MEDICATION SUMMARY - MEDICATIONS TO TAKE
I will START or STAY ON the medications listed below when I get home from the hospital:    aspirin 81 mg oral delayed release tablet  -- 1 tab(s) by mouth once a day  -- Indication: For Cardioprotective    losartan 100 mg oral tablet  -- 1 tab(s) by mouth once a day  -- Indication: For Hypertension    sertraline 25 mg oral tablet  -- 1 tab(s) by mouth once a day  -- Indication: For Anxiety    metFORMIN 500 mg oral tablet  --  by mouth 3 times a day  -- Indication: For Diabetes    glimepiride 4 mg oral tablet  -- 1 tab(s) by mouth once a day  -- Indication: For Diabetes    atorvastatin 20 mg oral tablet  -- 1 tab(s) by mouth once a day (at bedtime)  -- Indication: For Hyperlipdemia    zolpidem 10 mg oral tablet  -- 1 tab(s) by mouth once a day (at bedtime)  -- Indication: For sleep disturbacne     metoprolol tartrate 25 mg oral tablet  -- 1 tab(s) by mouth 2 times a day  -- It is very important that you take or use this exactly as directed.  Do not skip doses or discontinue unless directed by your doctor.  May cause drowsiness.  Alcohol may intensify this effect.  Use care when operating dangerous machinery.  Some non-prescription drugs may aggravate your condition.  Read all labels carefully.  If a warning appears, check with your doctor before taking.  Take with food or milk.  This drug may impair the ability to drive or operate machinery.  Use care until you become familiar with its effects.    -- Indication: For Hypertension    amLODIPine 5 mg oral tablet  -- 1 tab(s) by mouth once a day  -- Indication: For Hypertension    Ceftin 250 mg oral tablet  -- 1 tab(s) by mouth 2 times a day  -- Finish all this medication unless otherwise directed by prescriber.  Medication should be taken with plenty of water.  Take with food or milk.    -- Indication: For UTI (urinary tract infection)    omeprazole 40 mg oral delayed release capsule  -- 1 cap(s) by mouth once a day  -- Indication: For gerd    levothyroxine 100 mcg (0.1 mg) oral tablet  -- 1 tab(s) by mouth once a day  -- Indication: For synthyroid     Vitamin D2 50,000 intl units (1.25 mg) oral capsule  -- 1 cap(s) by mouth once a week  -- Indication: For vitamin suppments

## 2017-04-24 VITALS
HEART RATE: 79 BPM | OXYGEN SATURATION: 98 % | TEMPERATURE: 99 F | SYSTOLIC BLOOD PRESSURE: 142 MMHG | DIASTOLIC BLOOD PRESSURE: 71 MMHG | RESPIRATION RATE: 18 BRPM

## 2017-04-24 LAB
ANION GAP SERPL CALC-SCNC: 6 MMOL/L — SIGNIFICANT CHANGE UP (ref 5–17)
BASOPHILS # BLD AUTO: 0.1 K/UL — SIGNIFICANT CHANGE UP (ref 0–0.2)
BASOPHILS NFR BLD AUTO: 1.1 % — SIGNIFICANT CHANGE UP (ref 0–2)
BUN SERPL-MCNC: 20 MG/DL — HIGH (ref 7–18)
CALCIUM SERPL-MCNC: 8.7 MG/DL — SIGNIFICANT CHANGE UP (ref 8.4–10.5)
CHLORIDE SERPL-SCNC: 104 MMOL/L — SIGNIFICANT CHANGE UP (ref 96–108)
CO2 SERPL-SCNC: 30 MMOL/L — SIGNIFICANT CHANGE UP (ref 22–31)
CREAT SERPL-MCNC: 1.04 MG/DL — SIGNIFICANT CHANGE UP (ref 0.5–1.3)
EOSINOPHIL # BLD AUTO: 0.3 K/UL — SIGNIFICANT CHANGE UP (ref 0–0.5)
EOSINOPHIL NFR BLD AUTO: 3.5 % — SIGNIFICANT CHANGE UP (ref 0–6)
GLUCOSE SERPL-MCNC: 145 MG/DL — HIGH (ref 70–99)
HCT VFR BLD CALC: 41.3 % — SIGNIFICANT CHANGE UP (ref 34.5–45)
HGB BLD-MCNC: 13.8 G/DL — SIGNIFICANT CHANGE UP (ref 11.5–15.5)
LYMPHOCYTES # BLD AUTO: 2.8 K/UL — SIGNIFICANT CHANGE UP (ref 1–3.3)
LYMPHOCYTES # BLD AUTO: 32.1 % — SIGNIFICANT CHANGE UP (ref 13–44)
MAGNESIUM SERPL-MCNC: 1.9 MG/DL — SIGNIFICANT CHANGE UP (ref 1.8–2.4)
MCHC RBC-ENTMCNC: 29.2 PG — SIGNIFICANT CHANGE UP (ref 27–34)
MCHC RBC-ENTMCNC: 33.4 GM/DL — SIGNIFICANT CHANGE UP (ref 32–36)
MCV RBC AUTO: 87.4 FL — SIGNIFICANT CHANGE UP (ref 80–100)
MONOCYTES # BLD AUTO: 0.4 K/UL — SIGNIFICANT CHANGE UP (ref 0–0.9)
MONOCYTES NFR BLD AUTO: 5.1 % — SIGNIFICANT CHANGE UP (ref 2–14)
NEUTROPHILS # BLD AUTO: 5.1 K/UL — SIGNIFICANT CHANGE UP (ref 1.8–7.4)
NEUTROPHILS NFR BLD AUTO: 58.3 % — SIGNIFICANT CHANGE UP (ref 43–77)
PHOSPHATE SERPL-MCNC: 3 MG/DL — SIGNIFICANT CHANGE UP (ref 2.5–4.5)
PLATELET # BLD AUTO: 390 K/UL — SIGNIFICANT CHANGE UP (ref 150–400)
POTASSIUM SERPL-MCNC: 3.8 MMOL/L — SIGNIFICANT CHANGE UP (ref 3.5–5.3)
POTASSIUM SERPL-SCNC: 3.8 MMOL/L — SIGNIFICANT CHANGE UP (ref 3.5–5.3)
RBC # BLD: 4.73 M/UL — SIGNIFICANT CHANGE UP (ref 3.8–5.2)
RBC # FLD: 11.7 % — SIGNIFICANT CHANGE UP (ref 10.3–14.5)
SODIUM SERPL-SCNC: 140 MMOL/L — SIGNIFICANT CHANGE UP (ref 135–145)
WBC # BLD: 9 K/UL — SIGNIFICANT CHANGE UP (ref 3.8–10.5)
WBC # FLD AUTO: 9 K/UL — SIGNIFICANT CHANGE UP (ref 3.8–10.5)

## 2017-04-24 PROCEDURE — 83735 ASSAY OF MAGNESIUM: CPT

## 2017-04-24 PROCEDURE — 80048 BASIC METABOLIC PNL TOTAL CA: CPT

## 2017-04-24 PROCEDURE — 82306 VITAMIN D 25 HYDROXY: CPT

## 2017-04-24 PROCEDURE — 80061 LIPID PANEL: CPT

## 2017-04-24 PROCEDURE — 93306 TTE W/DOPPLER COMPLETE: CPT

## 2017-04-24 PROCEDURE — 82607 VITAMIN B-12: CPT

## 2017-04-24 PROCEDURE — 85027 COMPLETE CBC AUTOMATED: CPT

## 2017-04-24 PROCEDURE — A9502: CPT

## 2017-04-24 PROCEDURE — 99285 EMERGENCY DEPT VISIT HI MDM: CPT | Mod: 25

## 2017-04-24 PROCEDURE — 78452 HT MUSCLE IMAGE SPECT MULT: CPT

## 2017-04-24 PROCEDURE — 93017 CV STRESS TEST TRACING ONLY: CPT

## 2017-04-24 PROCEDURE — 81001 URINALYSIS AUTO W/SCOPE: CPT

## 2017-04-24 PROCEDURE — 82553 CREATINE MB FRACTION: CPT

## 2017-04-24 PROCEDURE — 85730 THROMBOPLASTIN TIME PARTIAL: CPT

## 2017-04-24 PROCEDURE — 84484 ASSAY OF TROPONIN QUANT: CPT

## 2017-04-24 PROCEDURE — 83036 HEMOGLOBIN GLYCOSYLATED A1C: CPT

## 2017-04-24 PROCEDURE — 80307 DRUG TEST PRSMV CHEM ANLYZR: CPT

## 2017-04-24 PROCEDURE — 82746 ASSAY OF FOLIC ACID SERUM: CPT

## 2017-04-24 PROCEDURE — 87086 URINE CULTURE/COLONY COUNT: CPT

## 2017-04-24 PROCEDURE — 80053 COMPREHEN METABOLIC PANEL: CPT

## 2017-04-24 PROCEDURE — 71046 X-RAY EXAM CHEST 2 VIEWS: CPT

## 2017-04-24 PROCEDURE — 82550 ASSAY OF CK (CPK): CPT

## 2017-04-24 PROCEDURE — 87186 SC STD MICRODIL/AGAR DIL: CPT

## 2017-04-24 PROCEDURE — 85610 PROTHROMBIN TIME: CPT

## 2017-04-24 PROCEDURE — 84100 ASSAY OF PHOSPHORUS: CPT

## 2017-04-24 PROCEDURE — 93005 ELECTROCARDIOGRAM TRACING: CPT

## 2017-04-24 PROCEDURE — 84443 ASSAY THYROID STIM HORMONE: CPT

## 2017-04-24 RX ORDER — METOPROLOL TARTRATE 50 MG
25 TABLET ORAL
Qty: 0 | Refills: 0 | Status: DISCONTINUED | OUTPATIENT
Start: 2017-04-24 | End: 2017-04-24

## 2017-04-24 RX ORDER — ASPIRIN/CALCIUM CARB/MAGNESIUM 324 MG
1 TABLET ORAL
Qty: 30 | Refills: 0
Start: 2017-04-24 | End: 2017-05-24

## 2017-04-24 RX ORDER — METOPROLOL TARTRATE 50 MG
1 TABLET ORAL
Qty: 60 | Refills: 0
Start: 2017-04-24 | End: 2017-05-24

## 2017-04-24 RX ORDER — ERGOCALCIFEROL 1.25 MG/1
1 CAPSULE ORAL
Qty: 4 | Refills: 0
Start: 2017-04-24 | End: 2017-05-24

## 2017-04-24 RX ORDER — ERGOCALCIFEROL 1.25 MG/1
1 CAPSULE ORAL
Qty: 4 | Refills: 0 | OUTPATIENT
Start: 2017-04-24 | End: 2017-05-24

## 2017-04-24 RX ORDER — CEFUROXIME AXETIL 250 MG
1 TABLET ORAL
Qty: 6 | Refills: 0 | OUTPATIENT
Start: 2017-04-24 | End: 2017-04-27

## 2017-04-24 RX ORDER — CEFUROXIME AXETIL 250 MG
1 TABLET ORAL
Qty: 6 | Refills: 0
Start: 2017-04-24 | End: 2017-04-27

## 2017-04-24 RX ORDER — ASPIRIN/CALCIUM CARB/MAGNESIUM 324 MG
1 TABLET ORAL
Qty: 30 | Refills: 0 | OUTPATIENT
Start: 2017-04-24 | End: 2017-05-24

## 2017-04-24 RX ORDER — ATORVASTATIN CALCIUM 80 MG/1
1 TABLET, FILM COATED ORAL
Qty: 0 | Refills: 0 | COMMUNITY

## 2017-04-24 RX ORDER — METOPROLOL TARTRATE 50 MG
1 TABLET ORAL
Qty: 60 | Refills: 0 | OUTPATIENT
Start: 2017-04-24 | End: 2017-05-24

## 2017-04-24 RX ADMIN — Medication 12.5 MILLIGRAM(S): at 06:33

## 2017-04-24 RX ADMIN — PANTOPRAZOLE SODIUM 40 MILLIGRAM(S): 20 TABLET, DELAYED RELEASE ORAL at 06:33

## 2017-04-24 RX ADMIN — LOSARTAN POTASSIUM 100 MILLIGRAM(S): 100 TABLET, FILM COATED ORAL at 06:33

## 2017-04-24 RX ADMIN — SERTRALINE 25 MILLIGRAM(S): 25 TABLET, FILM COATED ORAL at 12:21

## 2017-04-24 RX ADMIN — AMLODIPINE BESYLATE 5 MILLIGRAM(S): 2.5 TABLET ORAL at 06:33

## 2017-04-24 RX ADMIN — Medication 3: at 12:21

## 2017-04-24 RX ADMIN — ENOXAPARIN SODIUM 40 MILLIGRAM(S): 100 INJECTION SUBCUTANEOUS at 12:21

## 2017-04-24 RX ADMIN — CEFTRIAXONE 100 GRAM(S): 500 INJECTION, POWDER, FOR SOLUTION INTRAMUSCULAR; INTRAVENOUS at 10:23

## 2017-04-24 RX ADMIN — Medication 100 MICROGRAM(S): at 06:33

## 2017-04-24 RX ADMIN — Medication 81 MILLIGRAM(S): at 12:20

## 2017-04-26 LAB — CULTURE RESULTS: SIGNIFICANT CHANGE UP

## 2017-04-28 DIAGNOSIS — I10 ESSENTIAL (PRIMARY) HYPERTENSION: ICD-10-CM

## 2017-04-28 DIAGNOSIS — Z87.891 PERSONAL HISTORY OF NICOTINE DEPENDENCE: ICD-10-CM

## 2017-04-28 DIAGNOSIS — E11.9 TYPE 2 DIABETES MELLITUS WITHOUT COMPLICATIONS: ICD-10-CM

## 2017-04-28 DIAGNOSIS — Z79.84 LONG TERM (CURRENT) USE OF ORAL HYPOGLYCEMIC DRUGS: ICD-10-CM

## 2017-04-28 DIAGNOSIS — E03.9 HYPOTHYROIDISM, UNSPECIFIED: ICD-10-CM

## 2017-04-28 DIAGNOSIS — N39.0 URINARY TRACT INFECTION, SITE NOT SPECIFIED: ICD-10-CM

## 2017-04-28 DIAGNOSIS — Z79.52 LONG TERM (CURRENT) USE OF SYSTEMIC STEROIDS: ICD-10-CM

## 2017-04-28 DIAGNOSIS — E78.5 HYPERLIPIDEMIA, UNSPECIFIED: ICD-10-CM

## 2017-04-28 DIAGNOSIS — E55.9 VITAMIN D DEFICIENCY, UNSPECIFIED: ICD-10-CM

## 2017-04-28 DIAGNOSIS — R07.9 CHEST PAIN, UNSPECIFIED: ICD-10-CM

## 2017-04-28 DIAGNOSIS — F32.9 MAJOR DEPRESSIVE DISORDER, SINGLE EPISODE, UNSPECIFIED: ICD-10-CM

## 2017-09-26 ENCOUNTER — RESULT REVIEW (OUTPATIENT)
Age: 72
End: 2017-09-26

## 2017-09-26 ENCOUNTER — LABORATORY RESULT (OUTPATIENT)
Age: 72
End: 2017-09-26

## 2017-09-26 ENCOUNTER — APPOINTMENT (OUTPATIENT)
Dept: OBGYN | Facility: CLINIC | Age: 72
End: 2017-09-26
Payer: MEDICARE

## 2017-09-26 VITALS
SYSTOLIC BLOOD PRESSURE: 140 MMHG | BODY MASS INDEX: 24.63 KG/M2 | DIASTOLIC BLOOD PRESSURE: 80 MMHG | WEIGHT: 139 LBS | HEIGHT: 63 IN

## 2017-09-26 DIAGNOSIS — F15.90 OTHER STIMULANT USE, UNSPECIFIED, UNCOMPLICATED: ICD-10-CM

## 2017-09-26 DIAGNOSIS — I10 ESSENTIAL (PRIMARY) HYPERTENSION: ICD-10-CM

## 2017-09-26 DIAGNOSIS — N90.89 OTHER SPECIFIED NONINFLAMMATORY DISORDERS OF VULVA AND PERINEUM: ICD-10-CM

## 2017-09-26 DIAGNOSIS — E11.9 TYPE 2 DIABETES MELLITUS W/OUT COMPLICATIONS: ICD-10-CM

## 2017-09-26 DIAGNOSIS — E07.9 DISORDER OF THYROID, UNSPECIFIED: ICD-10-CM

## 2017-09-26 DIAGNOSIS — E78.00 PURE HYPERCHOLESTEROLEMIA, UNSPECIFIED: ICD-10-CM

## 2017-09-26 DIAGNOSIS — G47.00 INSOMNIA, UNSPECIFIED: ICD-10-CM

## 2017-09-26 PROCEDURE — 99387 INIT PM E/M NEW PAT 65+ YRS: CPT | Mod: 25

## 2017-09-26 PROCEDURE — 56605 BIOPSY OF VULVA/PERINEUM: CPT

## 2017-09-26 RX ORDER — ETOH/EUC OIL/MENTH/PEP/WINTERG
1 SPRAY, NON-AEROSOL (ML) MUCOUS MEMBRANE
Qty: 1 | Refills: 0 | Status: ACTIVE | COMMUNITY
Start: 2017-09-26 | End: 1900-01-01

## 2017-09-26 RX ORDER — ATORVASTATIN CALCIUM 80 MG/1
TABLET, FILM COATED ORAL
Refills: 0 | Status: ACTIVE | COMMUNITY

## 2017-09-26 RX ORDER — LEVOTHYROXINE SODIUM 0.17 MG/1
TABLET ORAL
Refills: 0 | Status: ACTIVE | COMMUNITY

## 2017-09-26 RX ORDER — LOSARTAN POTASSIUM 100 MG/1
TABLET, FILM COATED ORAL
Refills: 0 | Status: ACTIVE | COMMUNITY

## 2017-10-07 ENCOUNTER — EMERGENCY (EMERGENCY)
Facility: HOSPITAL | Age: 72
LOS: 1 days | Discharge: HOME | End: 2017-10-07
Attending: EMERGENCY MEDICINE
Payer: COMMERCIAL

## 2017-10-07 VITALS
DIASTOLIC BLOOD PRESSURE: 66 MMHG | TEMPERATURE: 98 F | OXYGEN SATURATION: 99 % | HEART RATE: 83 BPM | WEIGHT: 130.07 LBS | HEIGHT: 63 IN | RESPIRATION RATE: 16 BRPM | SYSTOLIC BLOOD PRESSURE: 144 MMHG

## 2017-10-07 VITALS
SYSTOLIC BLOOD PRESSURE: 149 MMHG | HEART RATE: 84 BPM | TEMPERATURE: 98 F | RESPIRATION RATE: 16 BRPM | OXYGEN SATURATION: 100 % | DIASTOLIC BLOOD PRESSURE: 67 MMHG

## 2017-10-07 LAB
ALBUMIN SERPL ELPH-MCNC: 3.8 G/DL — SIGNIFICANT CHANGE UP (ref 3.5–5)
ALP SERPL-CCNC: 93 U/L — SIGNIFICANT CHANGE UP (ref 40–120)
ALT FLD-CCNC: 26 U/L DA — SIGNIFICANT CHANGE UP (ref 10–60)
ANION GAP SERPL CALC-SCNC: 8 MMOL/L — SIGNIFICANT CHANGE UP (ref 5–17)
APPEARANCE UR: CLEAR — SIGNIFICANT CHANGE UP
AST SERPL-CCNC: 16 U/L — SIGNIFICANT CHANGE UP (ref 10–40)
BASOPHILS # BLD AUTO: 0.1 K/UL — SIGNIFICANT CHANGE UP (ref 0–0.2)
BASOPHILS NFR BLD AUTO: 1 % — SIGNIFICANT CHANGE UP (ref 0–2)
BILIRUB SERPL-MCNC: 0.3 MG/DL — SIGNIFICANT CHANGE UP (ref 0.2–1.2)
BILIRUB UR-MCNC: NEGATIVE — SIGNIFICANT CHANGE UP
BUN SERPL-MCNC: 12 MG/DL — SIGNIFICANT CHANGE UP (ref 7–18)
CALCIUM SERPL-MCNC: 9.6 MG/DL — SIGNIFICANT CHANGE UP (ref 8.4–10.5)
CHLORIDE SERPL-SCNC: 104 MMOL/L — SIGNIFICANT CHANGE UP (ref 96–108)
CO2 SERPL-SCNC: 29 MMOL/L — SIGNIFICANT CHANGE UP (ref 22–31)
COLOR SPEC: YELLOW — SIGNIFICANT CHANGE UP
CREAT SERPL-MCNC: 0.76 MG/DL — SIGNIFICANT CHANGE UP (ref 0.5–1.3)
DIFF PNL FLD: NEGATIVE — SIGNIFICANT CHANGE UP
EOSINOPHIL # BLD AUTO: 0.1 K/UL — SIGNIFICANT CHANGE UP (ref 0–0.5)
EOSINOPHIL NFR BLD AUTO: 1.6 % — SIGNIFICANT CHANGE UP (ref 0–6)
GLUCOSE SERPL-MCNC: 104 MG/DL — HIGH (ref 70–99)
GLUCOSE UR QL: NEGATIVE — SIGNIFICANT CHANGE UP
HCT VFR BLD CALC: 45.9 % — HIGH (ref 34.5–45)
HGB BLD-MCNC: 14.4 G/DL — SIGNIFICANT CHANGE UP (ref 11.5–15.5)
KETONES UR-MCNC: NEGATIVE — SIGNIFICANT CHANGE UP
LEUKOCYTE ESTERASE UR-ACNC: ABNORMAL
LIDOCAIN IGE QN: 117 U/L — SIGNIFICANT CHANGE UP (ref 73–393)
LYMPHOCYTES # BLD AUTO: 2.6 K/UL — SIGNIFICANT CHANGE UP (ref 1–3.3)
LYMPHOCYTES # BLD AUTO: 27.5 % — SIGNIFICANT CHANGE UP (ref 13–44)
MCHC RBC-ENTMCNC: 28.8 PG — SIGNIFICANT CHANGE UP (ref 27–34)
MCHC RBC-ENTMCNC: 31.4 GM/DL — LOW (ref 32–36)
MCV RBC AUTO: 91.9 FL — SIGNIFICANT CHANGE UP (ref 80–100)
MONOCYTES # BLD AUTO: 0.5 K/UL — SIGNIFICANT CHANGE UP (ref 0–0.9)
MONOCYTES NFR BLD AUTO: 5.8 % — SIGNIFICANT CHANGE UP (ref 2–14)
NEUTROPHILS # BLD AUTO: 6 K/UL — SIGNIFICANT CHANGE UP (ref 1.8–7.4)
NEUTROPHILS NFR BLD AUTO: 64.1 % — SIGNIFICANT CHANGE UP (ref 43–77)
NITRITE UR-MCNC: NEGATIVE — SIGNIFICANT CHANGE UP
PH UR: 7 — SIGNIFICANT CHANGE UP (ref 5–8)
PLATELET # BLD AUTO: 380 K/UL — SIGNIFICANT CHANGE UP (ref 150–400)
POTASSIUM SERPL-MCNC: 3.4 MMOL/L — LOW (ref 3.5–5.3)
POTASSIUM SERPL-SCNC: 3.4 MMOL/L — LOW (ref 3.5–5.3)
PROT SERPL-MCNC: 7.9 G/DL — SIGNIFICANT CHANGE UP (ref 6–8.3)
PROT UR-MCNC: NEGATIVE — SIGNIFICANT CHANGE UP
RBC # BLD: 5 M/UL — SIGNIFICANT CHANGE UP (ref 3.8–5.2)
RBC # FLD: 13.1 % — SIGNIFICANT CHANGE UP (ref 10.3–14.5)
SODIUM SERPL-SCNC: 141 MMOL/L — SIGNIFICANT CHANGE UP (ref 135–145)
SP GR SPEC: 1 — LOW (ref 1.01–1.02)
UROBILINOGEN FLD QL: NEGATIVE — SIGNIFICANT CHANGE UP
WBC # BLD: 9.4 K/UL — SIGNIFICANT CHANGE UP (ref 3.8–10.5)
WBC # FLD AUTO: 9.4 K/UL — SIGNIFICANT CHANGE UP (ref 3.8–10.5)

## 2017-10-07 PROCEDURE — 99284 EMERGENCY DEPT VISIT MOD MDM: CPT

## 2017-10-07 PROCEDURE — 96374 THER/PROPH/DIAG INJ IV PUSH: CPT | Mod: XU

## 2017-10-07 PROCEDURE — 99284 EMERGENCY DEPT VISIT MOD MDM: CPT | Mod: 25

## 2017-10-07 PROCEDURE — 74177 CT ABD & PELVIS W/CONTRAST: CPT

## 2017-10-07 PROCEDURE — 74177 CT ABD & PELVIS W/CONTRAST: CPT | Mod: 26

## 2017-10-07 RX ORDER — IBUPROFEN 200 MG
1 TABLET ORAL
Qty: 30 | Refills: 0
Start: 2017-10-07

## 2017-10-07 RX ORDER — CEFUROXIME AXETIL 250 MG
1 TABLET ORAL
Qty: 20 | Refills: 0
Start: 2017-10-07 | End: 2017-10-17

## 2017-10-07 RX ORDER — KETOROLAC TROMETHAMINE 30 MG/ML
15 SYRINGE (ML) INJECTION ONCE
Qty: 0 | Refills: 0 | Status: DISCONTINUED | OUTPATIENT
Start: 2017-10-07 | End: 2017-10-07

## 2017-10-07 RX ORDER — CEFUROXIME AXETIL 250 MG
250 TABLET ORAL ONCE
Qty: 0 | Refills: 0 | Status: COMPLETED | OUTPATIENT
Start: 2017-10-07 | End: 2017-10-07

## 2017-10-07 RX ORDER — TRAMADOL HYDROCHLORIDE 50 MG/1
1 TABLET ORAL
Qty: 8 | Refills: 0
Start: 2017-10-07

## 2017-10-07 RX ADMIN — Medication 15 MILLIGRAM(S): at 23:20

## 2017-10-07 RX ADMIN — Medication 250 MILLIGRAM(S): at 23:20

## 2017-10-07 NOTE — ED PROVIDER NOTE - MEDICAL DECISION MAKING DETAILS
73 y/o F pt with sxs suggesting likely ureteral colic. Will obtain labs, urine, pain control, CT abd and reassess. 73 y/o F pt with sxs suggesting likely ureteral colic. Will obtain labs, urine, pain control, CT abd and reassess.  u/a suggestive of Urinary tract infection, CT unremarkable. home with ceftin and pain control

## 2017-10-07 NOTE — ED PROVIDER NOTE - CHPI ED SYMPTOMS NEG
no shortness of breath, no cough, no chest pain, no palpitations,/no nausea/no vomiting/no diarrhea/no dysuria/no fever/no hematuria/no chills

## 2017-10-07 NOTE — ED PROVIDER NOTE - OBJECTIVE STATEMENT
71 y/o F pt with PMHx of DM, HTN, hypothyroid, kidney stones, and PSHx of cholecystomy, presents to ED c/o intermittent RLQ pain that radiates to her back x 6 days. Pt notes having urinary frequency. Pt denies fever, chills, shortness of breath, cough, chest pain, palpitations, nausea, vomiting, diarrhea, dysuria, hematuria, or any other complaints. NKDA.

## 2017-10-11 DIAGNOSIS — Z79.82 LONG TERM (CURRENT) USE OF ASPIRIN: ICD-10-CM

## 2017-10-11 DIAGNOSIS — E11.9 TYPE 2 DIABETES MELLITUS WITHOUT COMPLICATIONS: ICD-10-CM

## 2017-10-11 DIAGNOSIS — E03.9 HYPOTHYROIDISM, UNSPECIFIED: ICD-10-CM

## 2017-10-11 DIAGNOSIS — I10 ESSENTIAL (PRIMARY) HYPERTENSION: ICD-10-CM

## 2017-10-11 DIAGNOSIS — N39.0 URINARY TRACT INFECTION, SITE NOT SPECIFIED: ICD-10-CM

## 2017-10-11 DIAGNOSIS — Z79.84 LONG TERM (CURRENT) USE OF ORAL HYPOGLYCEMIC DRUGS: ICD-10-CM

## 2017-10-11 DIAGNOSIS — Z87.442 PERSONAL HISTORY OF URINARY CALCULI: ICD-10-CM

## 2017-10-11 DIAGNOSIS — F32.9 MAJOR DEPRESSIVE DISORDER, SINGLE EPISODE, UNSPECIFIED: ICD-10-CM

## 2017-10-24 ENCOUNTER — APPOINTMENT (OUTPATIENT)
Dept: OBGYN | Facility: CLINIC | Age: 72
End: 2017-10-24

## 2017-12-20 NOTE — ED ADULT NURSE NOTE - EXTENSIONS OF SELF_ADULT
Follow up DUB   29 y.o.  Recent SAB , and patient was to get progesterone levels , but had positive pregnancy tyest , and levels were marginal   Started on BID progesterone . Follow  Up levels still borderline   Results for AQUILES CURRY (MRN [id number]) as of 2017 12:30   Ref. Range 2017 12:16 2017 11:19 2017 07:08 2017 14:17 2017 13:55   Progesterone Latest Units: ng/mL  15.6 24.07 21.57 22.4   Bhcg Latest Ref Range: 0.0 - 5.0 mIU/mL    5,946.1 (H)    Beta-Hcg Quantitative Latest Units: mIU/mL     6,220   Patient this week raised to TID progesterone   Subjective : No bleeding , no cramps ,     OB ULTRASOUND LIMITED SUMMARY  Per my Read   Transvaginal  First trimester findings: Intrauterine gestational sac seen: yes  Gestational sac summary: fetal pole seen, yolk sac seen, fetal cardiac activity, EGA: 6 weeks + 2 days  Fetal cardiac activity: present, Positive Doppler flow , eccentric fetal pole location , jose gestational hemorrhage noted as well    Crown-rump length: .21 cm      Ass Threatened            Bordferline levels   P. Continue TID progesterone Supp 100mg       Hydrate      Re Check Levels       RTC 2 weeks   None

## 2018-06-06 ENCOUNTER — EMERGENCY (EMERGENCY)
Facility: HOSPITAL | Age: 73
LOS: 1 days | Discharge: HOME | End: 2018-06-06
Attending: EMERGENCY MEDICINE
Payer: COMMERCIAL

## 2018-06-06 VITALS
SYSTOLIC BLOOD PRESSURE: 147 MMHG | RESPIRATION RATE: 18 BRPM | DIASTOLIC BLOOD PRESSURE: 89 MMHG | HEART RATE: 74 BPM | OXYGEN SATURATION: 100 % | TEMPERATURE: 98 F

## 2018-06-06 VITALS
DIASTOLIC BLOOD PRESSURE: 88 MMHG | OXYGEN SATURATION: 99 % | SYSTOLIC BLOOD PRESSURE: 138 MMHG | RESPIRATION RATE: 18 BRPM | HEART RATE: 72 BPM | TEMPERATURE: 98 F

## 2018-06-06 LAB
ANION GAP SERPL CALC-SCNC: 10 MMOL/L — SIGNIFICANT CHANGE UP (ref 5–17)
APTT BLD: 30.2 SEC — SIGNIFICANT CHANGE UP (ref 27.5–37.4)
BASOPHILS # BLD AUTO: 0.2 K/UL — SIGNIFICANT CHANGE UP (ref 0–0.2)
BASOPHILS NFR BLD AUTO: 1.2 % — SIGNIFICANT CHANGE UP (ref 0–2)
BUN SERPL-MCNC: 16 MG/DL — SIGNIFICANT CHANGE UP (ref 7–18)
CALCIUM SERPL-MCNC: 9 MG/DL — SIGNIFICANT CHANGE UP (ref 8.4–10.5)
CHLORIDE SERPL-SCNC: 104 MMOL/L — SIGNIFICANT CHANGE UP (ref 96–108)
CO2 SERPL-SCNC: 25 MMOL/L — SIGNIFICANT CHANGE UP (ref 22–31)
CREAT SERPL-MCNC: 0.9 MG/DL — SIGNIFICANT CHANGE UP (ref 0.5–1.3)
EOSINOPHIL # BLD AUTO: 0.2 K/UL — SIGNIFICANT CHANGE UP (ref 0–0.5)
EOSINOPHIL NFR BLD AUTO: 1.3 % — SIGNIFICANT CHANGE UP (ref 0–6)
GLUCOSE SERPL-MCNC: 109 MG/DL — HIGH (ref 70–99)
HCT VFR BLD CALC: 43.3 % — SIGNIFICANT CHANGE UP (ref 34.5–45)
HGB BLD-MCNC: 14.2 G/DL — SIGNIFICANT CHANGE UP (ref 11.5–15.5)
INR BLD: 0.95 RATIO — SIGNIFICANT CHANGE UP (ref 0.88–1.16)
LYMPHOCYTES # BLD AUTO: 2.8 K/UL — SIGNIFICANT CHANGE UP (ref 1–3.3)
LYMPHOCYTES # BLD AUTO: 22.1 % — SIGNIFICANT CHANGE UP (ref 13–44)
MCHC RBC-ENTMCNC: 29.6 PG — SIGNIFICANT CHANGE UP (ref 27–34)
MCHC RBC-ENTMCNC: 32.7 GM/DL — SIGNIFICANT CHANGE UP (ref 32–36)
MCV RBC AUTO: 90.4 FL — SIGNIFICANT CHANGE UP (ref 80–100)
MONOCYTES # BLD AUTO: 0.7 K/UL — SIGNIFICANT CHANGE UP (ref 0–0.9)
MONOCYTES NFR BLD AUTO: 5.2 % — SIGNIFICANT CHANGE UP (ref 2–14)
NEUTROPHILS # BLD AUTO: 8.8 K/UL — HIGH (ref 1.8–7.4)
NEUTROPHILS NFR BLD AUTO: 70.2 % — SIGNIFICANT CHANGE UP (ref 43–77)
PLATELET # BLD AUTO: 363 K/UL — SIGNIFICANT CHANGE UP (ref 150–400)
POTASSIUM SERPL-MCNC: 3.7 MMOL/L — SIGNIFICANT CHANGE UP (ref 3.5–5.3)
POTASSIUM SERPL-SCNC: 3.7 MMOL/L — SIGNIFICANT CHANGE UP (ref 3.5–5.3)
PROTHROM AB SERPL-ACNC: 10.3 SEC — SIGNIFICANT CHANGE UP (ref 9.8–12.7)
RBC # BLD: 4.79 M/UL — SIGNIFICANT CHANGE UP (ref 3.8–5.2)
RBC # FLD: 12.5 % — SIGNIFICANT CHANGE UP (ref 10.3–14.5)
SODIUM SERPL-SCNC: 139 MMOL/L — SIGNIFICANT CHANGE UP (ref 135–145)
WBC # BLD: 12.6 K/UL — HIGH (ref 3.8–10.5)
WBC # FLD AUTO: 12.6 K/UL — HIGH (ref 3.8–10.5)

## 2018-06-06 PROCEDURE — 73502 X-RAY EXAM HIP UNI 2-3 VIEWS: CPT

## 2018-06-06 PROCEDURE — 85730 THROMBOPLASTIN TIME PARTIAL: CPT

## 2018-06-06 PROCEDURE — 73502 X-RAY EXAM HIP UNI 2-3 VIEWS: CPT | Mod: 26,LT

## 2018-06-06 PROCEDURE — 99285 EMERGENCY DEPT VISIT HI MDM: CPT

## 2018-06-06 PROCEDURE — 85027 COMPLETE CBC AUTOMATED: CPT

## 2018-06-06 PROCEDURE — 73700 CT LOWER EXTREMITY W/O DYE: CPT

## 2018-06-06 PROCEDURE — 85610 PROTHROMBIN TIME: CPT

## 2018-06-06 PROCEDURE — 99284 EMERGENCY DEPT VISIT MOD MDM: CPT | Mod: 25

## 2018-06-06 PROCEDURE — 73700 CT LOWER EXTREMITY W/O DYE: CPT | Mod: 26,LT

## 2018-06-06 PROCEDURE — 80048 BASIC METABOLIC PNL TOTAL CA: CPT

## 2018-06-06 RX ORDER — MELOXICAM 15 MG/1
1 TABLET ORAL
Qty: 30 | Refills: 0 | OUTPATIENT
Start: 2018-06-06 | End: 2018-07-05

## 2018-06-06 RX ORDER — OXYCODONE AND ACETAMINOPHEN 5; 325 MG/1; MG/1
1 TABLET ORAL ONCE
Qty: 0 | Refills: 0 | Status: DISCONTINUED | OUTPATIENT
Start: 2018-06-06 | End: 2018-06-06

## 2018-06-06 RX ORDER — IBUPROFEN 200 MG
600 TABLET ORAL ONCE
Qty: 0 | Refills: 0 | Status: COMPLETED | OUTPATIENT
Start: 2018-06-06 | End: 2018-06-06

## 2018-06-06 RX ORDER — MELOXICAM 15 MG/1
1 TABLET ORAL
Qty: 14 | Refills: 0
Start: 2018-06-06 | End: 2018-06-19

## 2018-06-06 RX ADMIN — Medication 600 MILLIGRAM(S): at 14:24

## 2018-06-06 RX ADMIN — OXYCODONE AND ACETAMINOPHEN 1 TABLET(S): 5; 325 TABLET ORAL at 14:24

## 2018-06-06 NOTE — ED PROVIDER NOTE - PROGRESS NOTE DETAILS
pt feels much better. labs and CT noted.  pt advsied and offered ADMISSION due to pain with walking and fall risk.  pt and her friend were advised on this.  They are aware of the risks and benefits of going home with hip pain and risk of additoinal pains/falls/other injuries.   they are able to make competent, coherent discussions and are alert and oriented x3.   pt states that she has mobic at home has worked well for her in the past.  Will dc at this time with pmd and ortho f/u.

## 2018-06-06 NOTE — ED PROVIDER NOTE - OBJECTIVE STATEMENT
74 y/o female with PMHx of HTN, DM, HLD, hypothyroidism, renal stones presents to the ED c/o L hip pain. Pt notes she was walking yesterday when she had a sudden onset of L hip pain, pt was able to make it home but is now unable to walk secondary to pain. Pt is able to sit down and get up with pain but cannot bear weight, pt arrived in ED in a wheelchair. Pt denies numbness, tingling, or any other complaints. Pt denies any recent falls or trauma. Pt lives alone, pt brought to ED by friend. HALEY

## 2018-06-06 NOTE — ED PROVIDER NOTE - MEDICAL DECISION MAKING DETAILS
Pt with sudden onset L hip pain x yesterday. Xray noted. Will pursue CT scan, pain meds and reassess.

## 2018-06-06 NOTE — ED PROVIDER NOTE - MUSCULOSKELETAL MINIMAL EXAM
L hip tenderness, no deformity, pain with standing, able to minimally walk secondary to pain, able to flex and extend with no difficulty

## 2018-06-06 NOTE — ED ADULT NURSE NOTE - OBJECTIVE STATEMENT
pt is here for left hip pain.  c/o pain 10/10, pt stated that "I can't walk due to pain", denied chest pain or sob, denied N/V/D, pt calm at this time.

## 2018-06-11 NOTE — ED POST DISCHARGE NOTE - ADDITIONAL DOCUMENTATION
Called by pharmacy ([facility] [phone]). Meloxicam not covered by patient's insurance. I reviewed patients' chart. patient is on aspirin and is a fall risk for ED attending documentation. I recommended patient take tylenol as needed a f/u with PMD ASAP. Return to the ED immediately if getting worse, not improving, or if having any new or troubling symptoms.

## 2018-10-02 NOTE — ED ADULT TRIAGE NOTE - SPO2 (%)
Pinched nerve in neck     Pinched nerve in shoulder     Thyroid disease          SURGICAL HISTORY       Past Surgical History:   Procedure Laterality Date    BREAST BIOPSY      BREAST LUMPECTOMY      KIDNEY REMOVAL           CURRENT MEDICATIONS       Current Discharge Medication List      CONTINUE these medications which have NOT CHANGED    Details   cetirizine (ZYRTEC) 10 MG tablet Take 10 mg by mouth daily      hyoscyamine (LEVSIN/SL) 125 MCG sublingual tablet Place 125 mcg under the tongue every 4 hours as needed for Cramping      ondansetron (ZOFRAN) 4 MG tablet Take 4 mg by mouth every 6 hours as needed for Nausea or Vomiting      metoprolol succinate (TOPROL XL) 25 MG extended release tablet Take 12.5 mg by mouth daily       pantoprazole (PROTONIX) 40 MG tablet Take 40 mg by mouth daily      megestrol (MEGACE) 40 MG tablet Take 1 tablet by mouth 2 times daily  Qty: 60 tablet, Refills: 1      primidone (MYSOLINE) 50 MG tablet Take 2 tablets by mouth nightly  Qty: 180 tablet, Refills: 3    Comments: Please consider 90 day supplies to promote better adherence      lisinopril (PRINIVIL;ZESTRIL) 2.5 MG tablet Take 1.25 mg by mouth daily       levothyroxine (SYNTHROID) 75 MCG tablet Take 47.5 mcg by mouth Daily       sucralfate (CARAFATE) 1 GM tablet Take 0.5 g by mouth 4 times daily       Probiotic Product (PROBIOTIC ADVANCED PO) Take 1 tablet by mouth daily             ALLERGIES     Aspirin; Cephalexin; and Sulfa antibiotics    FAMILY HISTORY       Family History   Problem Relation Age of Onset    Cancer Mother         breast    Rheum Arthritis Mother     Heart Failure Father 80    Rheum Arthritis Father     Thyroid Disease Sister           SOCIAL HISTORY       Social History     Social History    Marital status: Single     Spouse name: N/A    Number of children: N/A    Years of education: N/A     Social History Main Topics    Smoking status: Never Smoker    Smokeless tobacco: Never Used    Alcohol use No    Drug use: No    Sexual activity: Not Currently     Other Topics Concern    None     Social History Narrative    None       SCREENINGS    Dewitt Coma Scale  Eye Opening: Spontaneous  Best Verbal Response: Oriented  Best Motor Response: Obeys commands  Dewitt Coma Scale Score: 15        PHYSICAL EXAM    (up to 7 for level 4, 8 or more for level 5)     ED Triage Vitals [10/02/18 1418]   BP Temp Temp Source Pulse Resp SpO2 Height Weight   (!) 177/79 97.6 °F (36.4 °C) Temporal 59 18 100 % 5' 6\" (1.676 m) 125 lb (56.7 kg)       Physical Exam   Constitutional: She is oriented to person, place, and time. She appears well-nourished. HENT:   Head: Normocephalic. Right Ear: External ear normal.   Left Ear: External ear normal.   Mouth/Throat: Oropharynx is clear and moist.   79year old female sitting on side of bed    Eyes: Pupils are equal, round, and reactive to light. Conjunctivae and EOM are normal.   Neck: Normal range of motion. Cardiovascular: Normal rate, regular rhythm, normal heart sounds and intact distal pulses. Pulmonary/Chest: Effort normal and breath sounds normal.   Abdominal: Soft. Bowel sounds are normal. There is no tenderness. Musculoskeletal: Normal range of motion. Neurological: She is alert and oriented to person, place, and time. Skin: Skin is warm and dry. Vitals reviewed. DIAGNOSTIC RESULTS     EKG: All EKG's are interpreted by the Emergency Department Physician who either signs or Co-signs this chart in the absence of a cardiologist.    There is a regular rate and rhythm. SR Normal NH interval and normal P waves. Normal QRS interval. Normal QT interval. No obvious ST elevation or ST depression.        RADIOLOGY:   Non-plain film images such as CT, Ultrasound and MRI are read by the radiologist. Plain radiographic images are visualized and preliminarily interpreted by the emergency physician with the below findings:        Interpretation per the Radiologist below, DISPOSITION        PATIENT REFERRED TO:  No follow-up provider specified.     DISCHARGE MEDICATIONS:       Current Discharge Medication List          (Please note that portions of this note were completed with a voice recognition program.  Efforts were made to edit the dictations but occasionally words are mis-transcribed.)              Debora Bennett, EDNA  10/03/18 [id number]

## 2018-11-14 NOTE — H&P ADULT. - NECK DETAILS
Detail Level: Detailed Add 52 Modifier (Optional): no Medical Necessity Clause: This procedure was medically necessary because the lesions that were treated were: Medical Necessity Information: It is in your best interest to select a reason for this procedure from the list below. All of these items fulfill various CMS LCD requirements except the new and changing color options. Consent: The patient's consent was obtained including but not limited to risks of crusting, scabbing, blistering, scarring, darker or lighter pigmentary change, recurrence, incomplete removal and infection. Post-Care Instructions: I reviewed with the patient in detail post-care instructions. Patient is to wear sunprotection, and avoid picking at any of the treated lesions. Pt may apply Vaseline to crusted or scabbing areas. Duration Of Freeze Thaw-Cycle (Seconds): 2 normal thyroid gland/supple/normal/no JVD

## 2019-01-28 NOTE — DISCHARGE NOTE ADULT - NS AS DC PROVIDER CONTACT Y/N MULTI
.     REASON FOR CONSULTATION:     Right Leg chronic DVT (deep venous thromboembolism) in the external iliac vein discovered on 2/8/2018.  Patient is on low-dose Eliquis 2.5 mg twice a day.                                   HISTORY OF PRESENT ILLNESS:  The patient is a 33 y.o. year old female who is here for 6-month re-evaluation because of chronic DVT at the right external iliac vein and abnormal laboratory study for anti-phospholipid antibodies.   patient has a history of acute DVT in 2014 when she was pregnant.   Patient is accompanied by her friend.     Patient reports today that she has a lot of symptoms. She has significant fatigue and also has problem maintaining sleep in the night. She also has pica for ice chips. Patient reports she was not able to tolerate oral iron supplementation, which was started after her gastric bypass surgery for weight control. She lost more than 100 pounds since her gastric bypass surgery on 05/01/2018. Patient reports no melena, no hematochezia. She is getting vitamin B12 injection weekly.    Patient also reports she has significant night sweats for the past several weeks. She reports it was drenching sweating. Required changing clothes in the night. She also had low fever about 100°F. She reports no recent flu or viral infection. Patient reports she went to the emergency room at [facility] recently because of groin lymphadenopathy. Patient had ultrasound examination at that hospital.     Patient reports she is on Eliquis anticoagulation. She denies bleeding problem. She has no worsening or recurrent leg edema or pains. No chest pain. No dyspnea.     Laboratory study today reported marginal anemia with hemoglobin 11.7 but normal platelets at 175,000 and WBC 4650. Her MCV was 93.5, MCHC 32.6. Her iron study reported ferritin 22.6, free iron 92, TIBC 391 and iron saturation 24%. Chemistry lab reported normal creatinine at 0.62. Normal electrolytes.  Marginally elevated alkaline phosphatase 119 and ALT 47 but otherwise normal AST and total bilirubin.     Physical examination today showed bilateral small groin lymphadenopathy with left one measuring up to 2 cm in diameter, mobile. She also has a small left neck lymph node posterior to the SCM muscle. No lymphadenopathy in the right neck or supraclavicular or axillary area.          Past Medical History:   Diagnosis Date   • Anemia    • Anxiety    • Depression    • DVT (deep vein thrombosis) in pregnancy (CMS/HCC) 2013   • GERD (gastroesophageal reflux disease)    • H/O Abnormal Pap smear of cervix     4+ years ago   • Tattoos    • Thrombophilia (CMS/HCC)      Past Surgical History:   Procedure Laterality Date   • CHOLECYSTECTOMY  2009   • COLPOSCOPY     • TUBAL ABDOMINAL LIGATION     • WISDOM TOOTH EXTRACTION     Gastric bypass in early May 2018.      HEMATOLOGIC/ONCOLOGIC HISTORY:  The patient is a 32 y.o. year old female who is here for initial evaluation because of the newly discovered chronic DVT at the right external iliac vein and abnormal laboratory study for anti-phospholipid antibodies patient has a history of acute DVT 4 years ago when she was pregnant.       This patient has ambiguous history of lower extremity DVT. According to patient, 4 years ago when she had 4th pregnancy, she had preeclampsia and her pregnancy was terminated at 32 weeks with early delivery. She did not have previous episodes of preeclampsia for the previous 3 pregnancies. Nevertheless, according to patient, she was tested positive for D-dimer in her serum and was started on heparin. She reports no venous duplex study or CT scan for the angiogram. After childbirth, she was given Coumadin for about 2 months and then stopped. Patient reports she never had really bad leg swelling and no pains in the legs when she was diagnosed of DVT at that point.     Recently patient was seen by surgeon, Dr. Brody and had EGD examination in late  02/2018. 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 Brody is planning to do gastric bypass surgery. This patient is also moderately obese, at least. However, when Dr. Brody learned about history of suspicious DVT, she was referred to vascular surgeon, Donovan Calderon MD for further evaluation.     Dr. Calderon saw patient on 02/08/2018 and obtained venous duplex study that same day. The study reported chronic DVT in right external iliac vein; however, other veins in the right leg had no evidence for thrombosis. The left leg was completely negative for venous thrombosis in both deep and superficial veins.     Dr. Calderon also obtained laboratory study for hypercoagulable workup on 02/08/2018. The study reported weakly positive anticardiolipin IgM at 22 units/mL and antiphosphatidylserine IgM antibody 38 units/mL but negative for the corresponding IgG and IgA in both antibodies. She was completely negative for the anti-beta-2 glycoprotein 1 antibodies. This patient also had normal antithrombin activity 105%, normal protein S activity 90% and protein C activity 151%. Her serum free protein S antigen was 64% and total protein S antigen was 82%. She had marginally elevated fibrinogen at 474 mg/dL. She was tested negative for lupus anticoagulant. This patient was also tested negative for mutation, factor V Leiden, and factor II.    Patient was started on low-dose Eliquis 2.5 mg every 12 hours on 3/14/2018.    Patient also reports she had gastric bypass surgery done on May 1, 2018.     We repeated laboratory study on 6/5/2018, she still had a marginally elevated anticardiolipin IgM antibody but negative for the IgG and IgA subtype, and she was also tested negative for anti-phosphatidylserine IgM antibody this time, as well as IgG and IgA subtype, and also negative for anti-beta-2 glycoprotein 1 antibodies.    On 6/5/2018, She was mildly anemic with a hemoglobin 11.4.  Has normal platelets 184,000 and WBC 6900  including neutrophils 4800 lymphocytes 1300.       MEDICATIONS    Current Outpatient Medications:   •  apixaban (ELIQUIS) 2.5 MG tablet tablet, Take 1 tablet by mouth Every 12 (Twelve) Hours., Disp: 60 tablet, Rfl: 11  •  Calcium 500-100 MG-UNIT chewable tablet, Chew 1 tablet Every 4 (Four) Hours As Needed., Disp: , Rfl:   •  Cyanocobalamin (VITAMIN B-12 IJ), Inject  as directed., Disp: , Rfl:   •  ferrous sulfate 325 (65 FE) MG tablet, Take 325 mg by mouth Daily With Breakfast., Disp: , Rfl:   •  Multiple Vitamin (MULTI VITAMIN DAILY PO), Take  by mouth., Disp: , Rfl:   •  omeprazole (priLOSEC) 40 MG capsule, Take 40 mg by mouth Daily., Disp: , Rfl:     ALLERGIES:     Allergies   Allergen Reactions   • Ciprofloxacin Shortness Of Breath and Swelling   • Oxycodone-Acetaminophen Itching, Rash, Shortness Of Breath and Swelling   • Penicillins Rash       SOCIAL HISTORY:       Social History     Social History   • Marital status:      Spouse name: Fritz    • Number of children: 4   • Years of education: High school      Occupational History   • Worked in a [employer].  Previously also worked as a        Social History Main Topics   • Smoking status: Former Smoker     Quit date: 12/12/2016   • Smokeless tobacco: Never Used   • Alcohol use Only occasionally.     • Drug use: No    • Sexual activity: Not on file         FAMILY HISTORY:  Family History   Problem Relation Age of Onset   • Breast cancer Maternal Grandmother    • Depression Maternal Grandmother    • Heart disease Maternal Grandmother    • Cancer Maternal Grandfather    • Brain cancer Paternal Grandmother    • Breast cancer Paternal Grandmother        REVIEW OF SYSTEMS:  Review of Systems   Constitutional: Positive for diaphoresis (Drenching night sweats required changing closest), fatigue, fever (Low fever at 100 Fahrenheit for possibility weeks) and unexpected weight change (Lost more than 100 pounds since gastric bypass in May 2018).  "Negative for chills.        Excessive fatigue and night sweats no fever.  Patient complains weight gains and excessive thirst.   HENT: Negative for dental problem, nosebleeds, rhinorrhea, sinus pressure, sore throat and trouble swallowing.    Eyes: Negative for photophobia, discharge and visual disturbance.        Poor vision   Respiratory: Positive for shortness of breath. Negative for cough, chest tightness and wheezing.    Cardiovascular: Positive for chest pain and palpitations. Negative for leg swelling.   Gastrointestinal: Positive for abdominal pain, diarrhea and nausea. Negative for abdominal distention, anal bleeding, blood in stool, rectal pain and vomiting.   Endocrine: Negative for cold intolerance and heat intolerance.   Genitourinary: Positive for frequency. Negative for dysuria, hematuria, menstrual problem, urgency and vaginal bleeding.   Musculoskeletal: Positive for arthralgias. Negative for back pain and joint swelling.   Skin: Negative.  Negative for rash and wound.   Neurological: Negative for dizziness, syncope and headaches.   Hematological: Positive for adenopathy (Bilateral growing in the left neck since early January). Does not bruise/bleed easily.   Psychiatric/Behavioral: Positive for sleep disturbance. Negative for confusion and hallucinations.              Vitals:    01/28/19 1108   BP: 108/69   Pulse: 64   Resp: 14   Temp: 98.6 °F (37 °C)   SpO2: 99%  Comment: at rest   Weight: 62 kg (136 lb 11.2 oz)   Height: 163 cm (64.17\")  Comment: new ht.   PainSc: 0-No pain   ECOG 0.     PHYSICAL EXAM:      CONSTITUTIONAL: Well-developed,  mild obese female BMI 35.3,  No distress, looks comfortable.  EYES:  Conjunctiva and lids unremarkable.   EARS,NOSE,MOUTH,THROAT:  Ears and nose appear unremarkable.  Lips appear unremarkable.  RESPIRATORY: Lungs clear to auscultation bilaterally.  Normal respiratory effort.   CARDIOVASCULAR:  Regular rhythm and rate.  Normal S1, S2.  No murmurs rubs or " gallops.    GASTROINTESTINAL:  Nontender.  No hepatomegaly.  No splenomegaly.  Bowel sounds normal.    LYMPHATIC:  Left and neck small lymph nodes about a one centimeter posterior to the SCM muscle.  No other neck lymphadenopathy.  No axillary lymphadenopathy.  Has bilateral groin lymphadenopathy, largest one is about 1.5-2 cm in the left groin area.    MUSCULOSKELETAL:  Unremarkable gait.  No cyanosis or clubbing.  No significant lower extremity edema.  SKIN:  Warm.  No rashes.  Patient has tattoos all over the body.    PSYCHIATRIC:  Normal judgment and insight.  Normal mood and affect.      RECENT LABS:    Lab Results   Component Value Date    WBC 4.65 01/28/2019    HGB 11.7 01/28/2019    HCT 35.9 01/28/2019    MCV 93.5 01/28/2019     01/28/2019     Lab Results   Component Value Date    NEUTROABS 2.69 01/28/2019     Lab Results   Component Value Date    GLUCOSE 97 01/28/2019    BUN 6 01/28/2019    CREATININE 0.62 01/28/2019    EGFRIFNONA 112 01/28/2019    BCR 9.7 01/28/2019    K 3.6 01/28/2019    CO2 21.3 (L) 01/28/2019    CALCIUM 9.3 01/28/2019    ALBUMIN 4.20 01/28/2019    AST 27 01/28/2019    ALT 47 (H) 01/28/2019     Sodium   Date Value Ref Range Status   01/28/2019 139 134 - 145 mmol/L Final     Potassium   Date Value Ref Range Status   01/28/2019 3.6 3.5 - 4.7 mmol/L Final     Total Bilirubin   Date Value Ref Range Status   01/28/2019 0.3 0.1 - 1.2 mg/dL Final     Alkaline Phosphatase   Date Value Ref Range Status   01/28/2019 119 (H) 38 - 116 U/L Final      Lab Results   Component Value Date    IRON 92 01/28/2019    TIBC 391 01/28/2019    FERRITIN 22.60 01/28/2019   Iron saturation 24%          Assessment/Plan      1. Right Leg chronic DVT (deep venous thromboembolism) in the external iliac vein discovered on 2/8/2018.  This patient possibly has antiphospholipid antibody syndrome.  Venous Doppler study on 2/8/2018 reported chronic DVT in the right external iliac vein.  She also had laboratory  study reported slightly elevated anticardiolipin IgM and also anti-phosphatidylserine IgM antibodies but no other antibodies in the antiphospholipid antibody family.  She had normal protein C and protein S activity, no evidence of mutation for both factor V Leiden and factor II.      We started patient on low-dose Eliquis 2.5 mg every 12 hours in middle March 2018.  She tolerated therapy very well.  This patient had a repeated a venous Doppler study on 4/5/2018 which showed resolution of thrombosis.      She will continue Eliquis for now.     2. Lymphadenopathy in bilateral groin area and also left neck. This patient also has drenching night sweats and low fever for the past few weeks. Highly suspect this is lymphoma. I recommended to have CT scan examination for neck, chest, abdomen and pelvis with IV contrast for further evaluation. The patient may need to be referred to general surgeon for excisional lymph node biopsy. In the meantime, I will also obtain medical records from the [facility] for reevaluation.     3. Iron deficiency. This patient has gastric bypass in early 05/2018 and was not able to tolerate oral iron treatment with significant nausea and abdomen cramping. Laboratory study shows persistent mild anemia and her iron study today showed evidence of iron deficiency with ferritin at 22.6. This patient will not absorb iron properly even if she is able to tolerate oral iron treatment because of the gastric bypass surgery.  I discussed with the patient, recommended intravenous iron treatment using Injectafer weekly for 2 doses. Patient voiced understanding.     4. Vitamin B12. Patient reports she has been on B12 injection weekly since her gastric bypass. She is receiving treatment from her primary care physician’s office.     PLAN:   1. CT scan examination for neck, chest, abdomen and pelvis with IV contrast for further evaluation.   2. Arrange weekly Injectafer x 2 times.   3.  Patient may need to be referred to general surgeon for excisional lymph node biopsy pending the CT scan results.   4. I will obtain medical records from [facility] [place] for her venous Doppler study and ultrasound examination for the groin.  5. We will obtain laboratory study, LDH and CMP. I will bring patient back in 2 weeks for reevaluation.         FUNMILAYO CAR M.D., Ph.D.    1/28/2019.      CC:   MD Conner Alvarez M.D., APRN      Dictated using Dragon Dictation.    Yes

## 2019-06-11 ENCOUNTER — TRANSCRIPTION ENCOUNTER (OUTPATIENT)
Age: 74
End: 2019-06-11

## 2019-06-12 ENCOUNTER — RESULT REVIEW (OUTPATIENT)
Age: 74
End: 2019-06-12

## 2019-06-12 ENCOUNTER — INPATIENT (INPATIENT)
Facility: HOSPITAL | Age: 74
LOS: 0 days | Discharge: HOME | DRG: 343 | End: 2019-06-13
Attending: SPECIALIST | Admitting: SPECIALIST
Payer: COMMERCIAL

## 2019-06-12 VITALS
HEART RATE: 100 BPM | SYSTOLIC BLOOD PRESSURE: 138 MMHG | OXYGEN SATURATION: 100 % | DIASTOLIC BLOOD PRESSURE: 82 MMHG | TEMPERATURE: 98 F | RESPIRATION RATE: 17 BRPM | WEIGHT: 160.06 LBS

## 2019-06-12 DIAGNOSIS — E03.9 HYPOTHYROIDISM, UNSPECIFIED: ICD-10-CM

## 2019-06-12 DIAGNOSIS — K35.80 UNSPECIFIED ACUTE APPENDICITIS: ICD-10-CM

## 2019-06-12 DIAGNOSIS — E78.5 HYPERLIPIDEMIA, UNSPECIFIED: ICD-10-CM

## 2019-06-12 DIAGNOSIS — E11.9 TYPE 2 DIABETES MELLITUS WITHOUT COMPLICATIONS: ICD-10-CM

## 2019-06-12 DIAGNOSIS — I10 ESSENTIAL (PRIMARY) HYPERTENSION: ICD-10-CM

## 2019-06-12 PROBLEM — F32.9 MAJOR DEPRESSIVE DISORDER, SINGLE EPISODE, UNSPECIFIED: Chronic | Status: ACTIVE | Noted: 2017-04-21

## 2019-06-12 LAB
ALBUMIN SERPL ELPH-MCNC: 3.8 G/DL — SIGNIFICANT CHANGE UP (ref 3.5–5)
ALP SERPL-CCNC: 110 U/L — SIGNIFICANT CHANGE UP (ref 40–120)
ALT FLD-CCNC: 16 U/L DA — SIGNIFICANT CHANGE UP (ref 10–60)
ANION GAP SERPL CALC-SCNC: 6 MMOL/L — SIGNIFICANT CHANGE UP (ref 5–17)
APPEARANCE UR: CLEAR — SIGNIFICANT CHANGE UP
APTT BLD: 33.8 SEC — SIGNIFICANT CHANGE UP (ref 27.5–36.3)
AST SERPL-CCNC: 9 U/L — LOW (ref 10–40)
BASOPHILS # BLD AUTO: 0.03 K/UL — SIGNIFICANT CHANGE UP (ref 0–0.2)
BASOPHILS NFR BLD AUTO: 0.3 % — SIGNIFICANT CHANGE UP (ref 0–2)
BILIRUB SERPL-MCNC: 0.3 MG/DL — SIGNIFICANT CHANGE UP (ref 0.2–1.2)
BILIRUB UR-MCNC: NEGATIVE — SIGNIFICANT CHANGE UP
BUN SERPL-MCNC: 13 MG/DL — SIGNIFICANT CHANGE UP (ref 7–18)
CALCIUM SERPL-MCNC: 9.4 MG/DL — SIGNIFICANT CHANGE UP (ref 8.4–10.5)
CHLORIDE SERPL-SCNC: 101 MMOL/L — SIGNIFICANT CHANGE UP (ref 96–108)
CO2 SERPL-SCNC: 30 MMOL/L — SIGNIFICANT CHANGE UP (ref 22–31)
COLOR SPEC: YELLOW — SIGNIFICANT CHANGE UP
CREAT SERPL-MCNC: 0.96 MG/DL — SIGNIFICANT CHANGE UP (ref 0.5–1.3)
DIFF PNL FLD: NEGATIVE — SIGNIFICANT CHANGE UP
EOSINOPHIL # BLD AUTO: 0.12 K/UL — SIGNIFICANT CHANGE UP (ref 0–0.5)
EOSINOPHIL NFR BLD AUTO: 1.1 % — SIGNIFICANT CHANGE UP (ref 0–6)
GLUCOSE BLDC GLUCOMTR-MCNC: 211 MG/DL — HIGH (ref 70–99)
GLUCOSE SERPL-MCNC: 202 MG/DL — HIGH (ref 70–99)
GLUCOSE UR QL: 250
HCT VFR BLD CALC: 41 % — SIGNIFICANT CHANGE UP (ref 34.5–45)
HGB BLD-MCNC: 13.4 G/DL — SIGNIFICANT CHANGE UP (ref 11.5–15.5)
IMM GRANULOCYTES NFR BLD AUTO: 0.6 % — SIGNIFICANT CHANGE UP (ref 0–1.5)
INR BLD: 1.04 RATIO — SIGNIFICANT CHANGE UP (ref 0.88–1.16)
KETONES UR-MCNC: NEGATIVE — SIGNIFICANT CHANGE UP
LACTATE SERPL-SCNC: 2 MMOL/L — SIGNIFICANT CHANGE UP (ref 0.7–2)
LACTATE SERPL-SCNC: 2.3 MMOL/L — HIGH (ref 0.7–2)
LACTATE SERPL-SCNC: 2.8 MMOL/L — HIGH (ref 0.7–2)
LEUKOCYTE ESTERASE UR-ACNC: NEGATIVE — SIGNIFICANT CHANGE UP
LYMPHOCYTES # BLD AUTO: 19.8 % — SIGNIFICANT CHANGE UP (ref 13–44)
LYMPHOCYTES # BLD AUTO: 2.12 K/UL — SIGNIFICANT CHANGE UP (ref 1–3.3)
MCHC RBC-ENTMCNC: 29.5 PG — SIGNIFICANT CHANGE UP (ref 27–34)
MCHC RBC-ENTMCNC: 32.7 GM/DL — SIGNIFICANT CHANGE UP (ref 32–36)
MCV RBC AUTO: 90.1 FL — SIGNIFICANT CHANGE UP (ref 80–100)
MONOCYTES # BLD AUTO: 0.76 K/UL — SIGNIFICANT CHANGE UP (ref 0–0.9)
MONOCYTES NFR BLD AUTO: 7.1 % — SIGNIFICANT CHANGE UP (ref 2–14)
NEUTROPHILS # BLD AUTO: 7.63 K/UL — HIGH (ref 1.8–7.4)
NEUTROPHILS NFR BLD AUTO: 71.1 % — SIGNIFICANT CHANGE UP (ref 43–77)
NITRITE UR-MCNC: NEGATIVE — SIGNIFICANT CHANGE UP
NRBC # BLD: 0 /100 WBCS — SIGNIFICANT CHANGE UP (ref 0–0)
PH UR: 6.5 — SIGNIFICANT CHANGE UP (ref 5–8)
PLATELET # BLD AUTO: 389 K/UL — SIGNIFICANT CHANGE UP (ref 150–400)
POTASSIUM SERPL-MCNC: 4.1 MMOL/L — SIGNIFICANT CHANGE UP (ref 3.5–5.3)
POTASSIUM SERPL-SCNC: 4.1 MMOL/L — SIGNIFICANT CHANGE UP (ref 3.5–5.3)
PROT SERPL-MCNC: 7.8 G/DL — SIGNIFICANT CHANGE UP (ref 6–8.3)
PROT UR-MCNC: 30 MG/DL
PROTHROM AB SERPL-ACNC: 11.6 SEC — SIGNIFICANT CHANGE UP (ref 10–12.9)
RBC # BLD: 4.55 M/UL — SIGNIFICANT CHANGE UP (ref 3.8–5.2)
RBC # FLD: 13.3 % — SIGNIFICANT CHANGE UP (ref 10.3–14.5)
SODIUM SERPL-SCNC: 137 MMOL/L — SIGNIFICANT CHANGE UP (ref 135–145)
SP GR SPEC: 1.01 — SIGNIFICANT CHANGE UP (ref 1.01–1.02)
UROBILINOGEN FLD QL: NEGATIVE — SIGNIFICANT CHANGE UP
WBC # BLD: 10.72 K/UL — HIGH (ref 3.8–10.5)
WBC # FLD AUTO: 10.72 K/UL — HIGH (ref 3.8–10.5)

## 2019-06-12 PROCEDURE — 99285 EMERGENCY DEPT VISIT HI MDM: CPT

## 2019-06-12 PROCEDURE — 74174 CTA ABD&PLVS W/CONTRAST: CPT | Mod: 26

## 2019-06-12 PROCEDURE — 44970 LAPAROSCOPY APPENDECTOMY: CPT

## 2019-06-12 PROCEDURE — 88304 TISSUE EXAM BY PATHOLOGIST: CPT | Mod: 26

## 2019-06-12 PROCEDURE — 71275 CT ANGIOGRAPHY CHEST: CPT | Mod: 26

## 2019-06-12 PROCEDURE — 99223 1ST HOSP IP/OBS HIGH 75: CPT | Mod: 57

## 2019-06-12 RX ORDER — ACETAMINOPHEN 500 MG
1000 TABLET ORAL ONCE
Refills: 0 | Status: DISCONTINUED | OUTPATIENT
Start: 2019-06-12 | End: 2019-06-12

## 2019-06-12 RX ORDER — METOPROLOL TARTRATE 50 MG
25 TABLET ORAL
Refills: 0 | Status: DISCONTINUED | OUTPATIENT
Start: 2019-06-12 | End: 2019-06-13

## 2019-06-12 RX ORDER — SERTRALINE 25 MG/1
1 TABLET, FILM COATED ORAL
Qty: 0 | Refills: 0 | DISCHARGE

## 2019-06-12 RX ORDER — INSULIN LISPRO 100/ML
VIAL (ML) SUBCUTANEOUS
Refills: 0 | Status: DISCONTINUED | OUTPATIENT
Start: 2019-06-12 | End: 2019-06-12

## 2019-06-12 RX ORDER — ONDANSETRON 8 MG/1
4 TABLET, FILM COATED ORAL ONCE
Refills: 0 | Status: COMPLETED | OUTPATIENT
Start: 2019-06-12 | End: 2019-06-12

## 2019-06-12 RX ORDER — DEXTROSE 50 % IN WATER 50 %
25 SYRINGE (ML) INTRAVENOUS ONCE
Refills: 0 | Status: DISCONTINUED | OUTPATIENT
Start: 2019-06-12 | End: 2019-06-12

## 2019-06-12 RX ORDER — SODIUM CHLORIDE 9 MG/ML
1000 INJECTION, SOLUTION INTRAVENOUS
Refills: 0 | Status: DISCONTINUED | OUTPATIENT
Start: 2019-06-12 | End: 2019-06-12

## 2019-06-12 RX ORDER — METOPROLOL TARTRATE 50 MG
25 TABLET ORAL
Refills: 0 | Status: CANCELLED | OUTPATIENT
Start: 2019-06-13 | End: 2019-06-12

## 2019-06-12 RX ORDER — SODIUM CHLORIDE 9 MG/ML
1000 INJECTION INTRAMUSCULAR; INTRAVENOUS; SUBCUTANEOUS
Refills: 0 | Status: DISCONTINUED | OUTPATIENT
Start: 2019-06-12 | End: 2019-06-12

## 2019-06-12 RX ORDER — INSULIN LISPRO 100/ML
VIAL (ML) SUBCUTANEOUS AT BEDTIME
Refills: 0 | Status: DISCONTINUED | OUTPATIENT
Start: 2019-06-12 | End: 2019-06-12

## 2019-06-12 RX ORDER — HEPARIN SODIUM 5000 [USP'U]/ML
5000 INJECTION INTRAVENOUS; SUBCUTANEOUS EVERY 8 HOURS
Refills: 0 | Status: DISCONTINUED | OUTPATIENT
Start: 2019-06-12 | End: 2019-06-13

## 2019-06-12 RX ORDER — MORPHINE SULFATE 50 MG/1
4 CAPSULE, EXTENDED RELEASE ORAL ONCE
Refills: 0 | Status: DISCONTINUED | OUTPATIENT
Start: 2019-06-12 | End: 2019-06-12

## 2019-06-12 RX ORDER — AMLODIPINE BESYLATE 2.5 MG/1
5 TABLET ORAL DAILY
Refills: 0 | Status: CANCELLED | OUTPATIENT
Start: 2019-06-13 | End: 2019-06-12

## 2019-06-12 RX ORDER — GLUCAGON INJECTION, SOLUTION 0.5 MG/.1ML
1 INJECTION, SOLUTION SUBCUTANEOUS ONCE
Refills: 0 | Status: DISCONTINUED | OUTPATIENT
Start: 2019-06-12 | End: 2019-06-12

## 2019-06-12 RX ORDER — LOSARTAN POTASSIUM 100 MG/1
100 TABLET, FILM COATED ORAL DAILY
Refills: 0 | Status: DISCONTINUED | OUTPATIENT
Start: 2019-06-12 | End: 2019-06-13

## 2019-06-12 RX ORDER — DEXTROSE 50 % IN WATER 50 %
12.5 SYRINGE (ML) INTRAVENOUS ONCE
Refills: 0 | Status: DISCONTINUED | OUTPATIENT
Start: 2019-06-12 | End: 2019-06-12

## 2019-06-12 RX ORDER — LEVOTHYROXINE SODIUM 125 MCG
100 TABLET ORAL DAILY
Refills: 0 | Status: DISCONTINUED | OUTPATIENT
Start: 2019-06-12 | End: 2019-06-13

## 2019-06-12 RX ORDER — INSULIN LISPRO 100/ML
VIAL (ML) SUBCUTANEOUS
Refills: 0 | Status: DISCONTINUED | OUTPATIENT
Start: 2019-06-12 | End: 2019-06-13

## 2019-06-12 RX ORDER — HYDROMORPHONE HYDROCHLORIDE 2 MG/ML
0.5 INJECTION INTRAMUSCULAR; INTRAVENOUS; SUBCUTANEOUS
Refills: 0 | Status: DISCONTINUED | OUTPATIENT
Start: 2019-06-12 | End: 2019-06-12

## 2019-06-12 RX ORDER — SODIUM CHLORIDE 9 MG/ML
1000 INJECTION INTRAMUSCULAR; INTRAVENOUS; SUBCUTANEOUS ONCE
Refills: 0 | Status: COMPLETED | OUTPATIENT
Start: 2019-06-12 | End: 2019-06-12

## 2019-06-12 RX ORDER — AMLODIPINE BESYLATE 2.5 MG/1
5 TABLET ORAL DAILY
Refills: 0 | Status: DISCONTINUED | OUTPATIENT
Start: 2019-06-12 | End: 2019-06-13

## 2019-06-12 RX ORDER — ONDANSETRON 8 MG/1
4 TABLET, FILM COATED ORAL ONCE
Refills: 0 | Status: DISCONTINUED | OUTPATIENT
Start: 2019-06-12 | End: 2019-06-12

## 2019-06-12 RX ORDER — HEPARIN SODIUM 5000 [USP'U]/ML
5000 INJECTION INTRAVENOUS; SUBCUTANEOUS EVERY 8 HOURS
Refills: 0 | Status: DISCONTINUED | OUTPATIENT
Start: 2019-06-12 | End: 2019-06-12

## 2019-06-12 RX ORDER — DEXTROSE 50 % IN WATER 50 %
15 SYRINGE (ML) INTRAVENOUS ONCE
Refills: 0 | Status: DISCONTINUED | OUTPATIENT
Start: 2019-06-12 | End: 2019-06-12

## 2019-06-12 RX ORDER — LEVOTHYROXINE SODIUM 125 MCG
100 TABLET ORAL DAILY
Refills: 0 | Status: CANCELLED | OUTPATIENT
Start: 2019-06-13 | End: 2019-06-12

## 2019-06-12 RX ORDER — CEFOTETAN DISODIUM 1 G
2 VIAL (EA) INJECTION ONCE
Refills: 0 | Status: COMPLETED | OUTPATIENT
Start: 2019-06-12 | End: 2019-06-12

## 2019-06-12 RX ORDER — LOSARTAN POTASSIUM 100 MG/1
100 TABLET, FILM COATED ORAL DAILY
Refills: 0 | Status: CANCELLED | OUTPATIENT
Start: 2019-06-13 | End: 2019-06-12

## 2019-06-12 RX ADMIN — SODIUM CHLORIDE 1000 MILLILITER(S): 9 INJECTION INTRAMUSCULAR; INTRAVENOUS; SUBCUTANEOUS at 15:55

## 2019-06-12 RX ADMIN — SODIUM CHLORIDE 1000 MILLILITER(S): 9 INJECTION INTRAMUSCULAR; INTRAVENOUS; SUBCUTANEOUS at 15:00

## 2019-06-12 RX ADMIN — MORPHINE SULFATE 4 MILLIGRAM(S): 50 CAPSULE, EXTENDED RELEASE ORAL at 18:25

## 2019-06-12 RX ADMIN — MORPHINE SULFATE 4 MILLIGRAM(S): 50 CAPSULE, EXTENDED RELEASE ORAL at 14:56

## 2019-06-12 RX ADMIN — MORPHINE SULFATE 4 MILLIGRAM(S): 50 CAPSULE, EXTENDED RELEASE ORAL at 17:59

## 2019-06-12 RX ADMIN — SODIUM CHLORIDE 1000 MILLILITER(S): 9 INJECTION INTRAMUSCULAR; INTRAVENOUS; SUBCUTANEOUS at 16:57

## 2019-06-12 RX ADMIN — MORPHINE SULFATE 4 MILLIGRAM(S): 50 CAPSULE, EXTENDED RELEASE ORAL at 18:57

## 2019-06-12 RX ADMIN — MORPHINE SULFATE 4 MILLIGRAM(S): 50 CAPSULE, EXTENDED RELEASE ORAL at 16:57

## 2019-06-12 RX ADMIN — Medication 100 GRAM(S): at 18:43

## 2019-06-12 RX ADMIN — MORPHINE SULFATE 4 MILLIGRAM(S): 50 CAPSULE, EXTENDED RELEASE ORAL at 15:37

## 2019-06-12 NOTE — ED PROVIDER NOTE - CLINICAL SUMMARY MEDICAL DECISION MAKING FREE TEXT BOX
Character of concern for appendicitis. In light of radiation to leg will obtain dissection study, though low suspicion for this clinically, and no LE coolness or decreased pulses. Given fluids, morphine. Signed off care to Dr. ROJELIO Denson who will f/u CT and reassess. Character of concern for appendicitis. In light of radiation to leg will obtain dissection study, though low suspicion for this clinically, and no LE coolness or decreased pulses. Noted appendicitis. Given fluids, morphine, cefotetan. Surgery will see pt. Signed off care to Dr. ROJELIO Denson who will f/u surgery consult.

## 2019-06-12 NOTE — ED PROVIDER NOTE - PROGRESS NOTE DETAILS
Signed off care to Dr. ROJELIO Denson who will f/u CT and reassess. Keon Small of surgery who will see pt. Signed off care to Dr. ROJELIO Denson who will f/u surgical consult. Denson:  Surgery PA advised to admit to Dr. Quezada for OR tonight.

## 2019-06-12 NOTE — H&P ADULT - NSICDXPASTMEDICALHX_GEN_ALL_CORE_FT
PAST MEDICAL HISTORY:  Depression     Diabetes     HLD (hyperlipidemia)     Hypertension     Hypothyroidism     Kidney stones

## 2019-06-12 NOTE — H&P ADULT - NSHPLABSRESULTS_GEN_ALL_CORE
LABS:                        13.4   10.72 )-----------( 389      ( 2019 14:15 )             41.0     06-12    137  |  101  |  13  ----------------------------<  202<H>  4.1   |  30  |  0.96    Ca    9.4      2019 14:15    TPro  7.8  /  Alb  3.8  /  TBili  0.3  /  DBili  x   /  AST  9<L>  /  ALT  16  /  AlkPhos  110  06-12    PT/INR - ( 2019 18:15 )   PT: 11.6 sec;   INR: 1.04 ratio      PTT - ( 2019 18:15 )  PTT:33.8 sec  Urinalysis Basic - ( 2019 14:52 )    Color: Yellow / Appearance: Clear / S.010 / pH: x  Gluc: x / Ketone: Negative  / Bili: Negative / Urobili: Negative   Blood: x / Protein: 30 mg/dL / Nitrite: Negative   Leuk Esterase: Negative / RBC: 0-2 /HPF / WBC 0-2 /HPF   Sq Epi: x / Non Sq Epi: Moderate /HPF / Bacteria: Few /HPF    RADIOLOGY & ADDITIONAL STUDIES:  < from: CT Angio Abdomen and Pelvis w/ IV Cont (19 @ 17:23) >    IMPRESSION:  Findings consistent with appendicitis. Markedly enlarged   appendix with appendicoliths and stranding in the adjacent fat and   enlarged right lower quadrant lymph nodes.  No evidence of aortic dissection  Results were discussed with Dr. Seals at 5:50 PM on 2019.    JOAN MANTILLA M.D.,ATTENDING RADIOLOGIST  This document has been electronically signed. 2019  5:54PM    < end of copied text > LABS:                        13.4   10.72 )-----------( 389      ( 2019 14:15 )             41.0     -12    137  |  101  |  13  ----------------------------<  202<H>  4.1   |  30  |  0.96    Ca    9.4      2019 14:15    TPro  7.8  /  Alb  3.8  /  TBili  0.3  /  DBili  x   /  AST  9<L>  /  ALT  16  /  AlkPhos  110  06-12    PT/INR - ( 2019 18:15 )   PT: 11.6 sec;   INR: 1.04 ratio      PTT - ( 2019 18:15 )  PTT:33.8 sec    Lactate, Blood: 2.0 mmol/L (19 @ 18:03)    Lactate, Blood: 2.8 mmol/L (19 @ 16:02)    Urinalysis Basic - ( 2019 14:52 )    Color: Yellow / Appearance: Clear / S.010 / pH: x  Gluc: x / Ketone: Negative  / Bili: Negative / Urobili: Negative   Blood: x / Protein: 30 mg/dL / Nitrite: Negative   Leuk Esterase: Negative / RBC: 0-2 /HPF / WBC 0-2 /HPF   Sq Epi: x / Non Sq Epi: Moderate /HPF / Bacteria: Few /HPF    RADIOLOGY & ADDITIONAL STUDIES:  < from: CT Angio Abdomen and Pelvis w/ IV Cont (19 @ 17:23) >    IMPRESSION:  Findings consistent with appendicitis. Markedly enlarged   appendix with appendicoliths and stranding in the adjacent fat and   enlarged right lower quadrant lymph nodes.  No evidence of aortic dissection  Results were discussed with Dr. Seals at 5:50 PM on 2019.    JOAN MANTILLA M.D.,ATTENDING RADIOLOGIST  This document has been electronically signed. 2019  5:54PM    < end of copied text >

## 2019-06-12 NOTE — ED PROVIDER NOTE - CARE PLAN
Principal Discharge DX:	Acute appendicitis  Secondary Diagnosis:	Dehydration  Secondary Diagnosis:	Hyperglycemia

## 2019-06-12 NOTE — ED ADULT NURSE NOTE - CHPI ED NUR SYMPTOMS NEG
no hematuria/no nausea/no fever/no blood in stool/no burning urination/no diarrhea/no vomiting/no dysuria/no chills

## 2019-06-12 NOTE — H&P ADULT - HISTORY OF PRESENT ILLNESS
74 year old female with PMH HTN, HLD, hypothyroidism and DM and PSH of open cholecystectomy and hysterectomy presents to ED complaining of severe right sided abdominal pain which began 2 days ago and is progressively worsening and radiates to the back. Denies nausea, vomiting, fever, chills, chest pain, shortness of breath.

## 2019-06-12 NOTE — ED PROVIDER NOTE - OBJECTIVE STATEMENT
75 y/o F pt with a PMHx of depression, DM, HLD, HTN, hypothyroidism, kidney stones, and a significant PSHx of cholecystectomy, hysterectomy, presents to the ED with complaints of constant RUQ and RLQ abdominal pain is yesterday, now radiating down to bilateral legs. Patient reports she has never had this pain before. Patient notes burning urination, denies vomiting, fever, hematuria, chest pain, shortness of breath or any other symptoms. Patient notes she is currently taking metformin, glimepiride, amlodipine, zolpidem, losartan, metoprolol and omeprazole. NKDA.  #[id number]    75 y/o F pt with a PMHx of depression, DM, HLD, HTN, hypothyroidism, kidney stones, and a significant PSHx of cholecystectomy, hysterectomy, presents to the ED with complaints of constant RUQ and RLQ abdominal pain is yesterday, now radiating down to bilateral legs. Patient reports she has never had this pain before. Patient notes burning urination, denies vomiting, fever, hematuria, chest pain, shortness of breath or any other symptoms. Patient notes she is currently taking metformin, glimepiride, amlodipine, zolpidem, losartan, metoprolol and omeprazole. NKDA.  #[id number]  75 y/o F pt with a PMHx of depression, DM, HLD, HTN, hypothyroidism, kidney stones, and a significant PSHx of cholecystectomy 30 yrs ago, hysterectomy, presents to the ED with complaints of constant RUQ and RLQ abdominal pain since yesterday, severe, dull, now radiating down to her R leg. Patient reports she has never had this pain before. Patient notes burning urination. Denies vomiting, fever, hematuria, chest pain, shortness of breath, or any other symptoms. Patient notes she is currently taking metformin, glimepiride, amlodipine, zolpidem, losartan, metoprolol and omeprazole. NKDA.

## 2019-06-12 NOTE — H&P ADULT - ASSESSMENT
74 year old female with acute appendicitis with appendicolith. PMH HTN, HLD, DM, hypothyroidism 74 year old female with sepsis secondary to acute appendicitis with appendicolith. PMH HTN, HLD, DM, hypothyroidism

## 2019-06-12 NOTE — CHART NOTE - NSCHARTNOTEFT_GEN_A_CORE
Pt POD 0 s/p lap appy  resting comfortably  no n/v  voided    Vital Signs Last 24 Hrs  T(C): 36.8 (12 Jun 2019 22:54), Max: 36.8 (12 Jun 2019 13:00)  T(F): 98.2 (12 Jun 2019 22:54), Max: 98.2 (12 Jun 2019 13:00)  HR: 90 (12 Jun 2019 22:54) (76 - 100)  BP: 146/72 (12 Jun 2019 22:54) (104/86 - 154/63)  BP(mean): 90 (12 Jun 2019 21:38) (87 - 91)  RR: 18 (12 Jun 2019 22:54) (14 - 19)  SpO2: 98% (12 Jun 2019 22:54) (96% - 100%)    abd soft, NT, inc CDI    stable post-op

## 2019-06-12 NOTE — H&P ADULT - PROBLEM SELECTOR PLAN 1
Admit to Dr. Quezada   OR caden for Laparoscopic Appendectomy   preop labs pending  EKG pending  pain control

## 2019-06-12 NOTE — H&P ADULT - NSHPPHYSICALEXAM_GEN_ALL_CORE
Vital Signs Last 24 Hrs  T(C): 36.6 (12 Jun 2019 16:12), Max: 36.8 (12 Jun 2019 13:00)  T(F): 97.8 (12 Jun 2019 16:12), Max: 98.2 (12 Jun 2019 13:00)  HR: 76 (12 Jun 2019 16:17) (76 - 100)  BP: 145/65 (12 Jun 2019 16:17) (137/67 - 145/65)  RR: 17 (12 Jun 2019 16:12) (17 - 17)  SpO2: 100% (12 Jun 2019 16:12) (100% - 100%)    Physical Exam:    General:  Appears stated age, well-groomed, well-nourished, no distress  Eyes: EOMI  HENT:  WNL, no JVD  Chest: respirations nonlabored  Cardiovascular:  Regular rate & rhythm  Abdomen: soft, ++RLQ tenderness, nondistended  Extremities: no edema bilaterally  Skin: warm and dry  Musculoskeletal: no calf tenderness  Neuro:  Alert, oriented to time, place and person   Psych: normal affect

## 2019-06-12 NOTE — ED PROVIDER NOTE - PHYSICAL EXAMINATION
Afebrile, hemodynamically stable, saturating well  Appears in mild-moderate pain  Head NCAT  EOMI grossly, anicteric  MMM  RRR, nml S1/S2, no m/r/g  Lungs CTAB, no w/r/r  Abd soft, TTP in RUQ and RLQ with NT, no rebound or guarding  AAO, CN's 3-12 grossly intact  VASQUEZ spontaneously, no leg cyanosis or edema, 2+ symmetric radial and DP pulses  Skin warm, well perfused, no rashes or hives

## 2019-06-13 ENCOUNTER — TRANSCRIPTION ENCOUNTER (OUTPATIENT)
Age: 74
End: 2019-06-13

## 2019-06-13 ENCOUNTER — INBOUND DOCUMENT (OUTPATIENT)
Age: 74
End: 2019-06-13

## 2019-06-13 VITALS
HEART RATE: 93 BPM | OXYGEN SATURATION: 95 % | TEMPERATURE: 98 F | DIASTOLIC BLOOD PRESSURE: 57 MMHG | RESPIRATION RATE: 16 BRPM | SYSTOLIC BLOOD PRESSURE: 139 MMHG

## 2019-06-13 LAB
ANION GAP SERPL CALC-SCNC: 6 MMOL/L — SIGNIFICANT CHANGE UP (ref 5–17)
BASOPHILS # BLD AUTO: 0.02 K/UL — SIGNIFICANT CHANGE UP (ref 0–0.2)
BASOPHILS NFR BLD AUTO: 0.2 % — SIGNIFICANT CHANGE UP (ref 0–2)
BUN SERPL-MCNC: 10 MG/DL — SIGNIFICANT CHANGE UP (ref 7–18)
CALCIUM SERPL-MCNC: 8.6 MG/DL — SIGNIFICANT CHANGE UP (ref 8.4–10.5)
CHLORIDE SERPL-SCNC: 105 MMOL/L — SIGNIFICANT CHANGE UP (ref 96–108)
CO2 SERPL-SCNC: 28 MMOL/L — SIGNIFICANT CHANGE UP (ref 22–31)
CREAT SERPL-MCNC: 0.85 MG/DL — SIGNIFICANT CHANGE UP (ref 0.5–1.3)
EOSINOPHIL # BLD AUTO: 0 K/UL — SIGNIFICANT CHANGE UP (ref 0–0.5)
EOSINOPHIL NFR BLD AUTO: 0 % — SIGNIFICANT CHANGE UP (ref 0–6)
GLUCOSE BLDC GLUCOMTR-MCNC: 150 MG/DL — HIGH (ref 70–99)
GLUCOSE BLDC GLUCOMTR-MCNC: 190 MG/DL — HIGH (ref 70–99)
GLUCOSE BLDC GLUCOMTR-MCNC: 279 MG/DL — HIGH (ref 70–99)
GLUCOSE SERPL-MCNC: 219 MG/DL — HIGH (ref 70–99)
HCT VFR BLD CALC: 36.3 % — SIGNIFICANT CHANGE UP (ref 34.5–45)
HGB BLD-MCNC: 12.1 G/DL — SIGNIFICANT CHANGE UP (ref 11.5–15.5)
IMM GRANULOCYTES NFR BLD AUTO: 0.6 % — SIGNIFICANT CHANGE UP (ref 0–1.5)
LYMPHOCYTES # BLD AUTO: 0.83 K/UL — LOW (ref 1–3.3)
LYMPHOCYTES # BLD AUTO: 7.5 % — LOW (ref 13–44)
MCHC RBC-ENTMCNC: 29.7 PG — SIGNIFICANT CHANGE UP (ref 27–34)
MCHC RBC-ENTMCNC: 33.3 GM/DL — SIGNIFICANT CHANGE UP (ref 32–36)
MCV RBC AUTO: 89.2 FL — SIGNIFICANT CHANGE UP (ref 80–100)
MONOCYTES # BLD AUTO: 0.18 K/UL — SIGNIFICANT CHANGE UP (ref 0–0.9)
MONOCYTES NFR BLD AUTO: 1.6 % — LOW (ref 2–14)
NEUTROPHILS # BLD AUTO: 9.96 K/UL — HIGH (ref 1.8–7.4)
NEUTROPHILS NFR BLD AUTO: 90.1 % — HIGH (ref 43–77)
NRBC # BLD: 0 /100 WBCS — SIGNIFICANT CHANGE UP (ref 0–0)
PLATELET # BLD AUTO: 371 K/UL — SIGNIFICANT CHANGE UP (ref 150–400)
POTASSIUM SERPL-MCNC: 4 MMOL/L — SIGNIFICANT CHANGE UP (ref 3.5–5.3)
POTASSIUM SERPL-SCNC: 4 MMOL/L — SIGNIFICANT CHANGE UP (ref 3.5–5.3)
RBC # BLD: 4.07 M/UL — SIGNIFICANT CHANGE UP (ref 3.8–5.2)
RBC # FLD: 13.6 % — SIGNIFICANT CHANGE UP (ref 10.3–14.5)
SODIUM SERPL-SCNC: 139 MMOL/L — SIGNIFICANT CHANGE UP (ref 135–145)
WBC # BLD: 11.06 K/UL — HIGH (ref 3.8–10.5)
WBC # FLD AUTO: 11.06 K/UL — HIGH (ref 3.8–10.5)

## 2019-06-13 PROCEDURE — C1889: CPT

## 2019-06-13 PROCEDURE — 88304 TISSUE EXAM BY PATHOLOGIST: CPT

## 2019-06-13 PROCEDURE — 86850 RBC ANTIBODY SCREEN: CPT

## 2019-06-13 PROCEDURE — 36415 COLL VENOUS BLD VENIPUNCTURE: CPT

## 2019-06-13 PROCEDURE — 82962 GLUCOSE BLOOD TEST: CPT

## 2019-06-13 PROCEDURE — 81001 URINALYSIS AUTO W/SCOPE: CPT

## 2019-06-13 PROCEDURE — 99285 EMERGENCY DEPT VISIT HI MDM: CPT | Mod: 25

## 2019-06-13 PROCEDURE — 85610 PROTHROMBIN TIME: CPT

## 2019-06-13 PROCEDURE — 74174 CTA ABD&PLVS W/CONTRAST: CPT

## 2019-06-13 PROCEDURE — 80048 BASIC METABOLIC PNL TOTAL CA: CPT

## 2019-06-13 PROCEDURE — 86901 BLOOD TYPING SEROLOGIC RH(D): CPT

## 2019-06-13 PROCEDURE — 96376 TX/PRO/DX INJ SAME DRUG ADON: CPT

## 2019-06-13 PROCEDURE — 80053 COMPREHEN METABOLIC PANEL: CPT

## 2019-06-13 PROCEDURE — 83605 ASSAY OF LACTIC ACID: CPT

## 2019-06-13 PROCEDURE — 71275 CT ANGIOGRAPHY CHEST: CPT

## 2019-06-13 PROCEDURE — 85027 COMPLETE CBC AUTOMATED: CPT

## 2019-06-13 PROCEDURE — 86900 BLOOD TYPING SEROLOGIC ABO: CPT

## 2019-06-13 PROCEDURE — 85730 THROMBOPLASTIN TIME PARTIAL: CPT

## 2019-06-13 PROCEDURE — 96374 THER/PROPH/DIAG INJ IV PUSH: CPT

## 2019-06-13 PROCEDURE — 93005 ELECTROCARDIOGRAM TRACING: CPT

## 2019-06-13 RX ORDER — OXYCODONE HYDROCHLORIDE 5 MG/1
1 TABLET ORAL
Qty: 12 | Refills: 0
Start: 2019-06-13

## 2019-06-13 RX ADMIN — LOSARTAN POTASSIUM 100 MILLIGRAM(S): 100 TABLET, FILM COATED ORAL at 05:25

## 2019-06-13 RX ADMIN — AMLODIPINE BESYLATE 5 MILLIGRAM(S): 2.5 TABLET ORAL at 05:25

## 2019-06-13 RX ADMIN — HEPARIN SODIUM 5000 UNIT(S): 5000 INJECTION INTRAVENOUS; SUBCUTANEOUS at 05:26

## 2019-06-13 RX ADMIN — Medication 2: at 07:45

## 2019-06-13 RX ADMIN — Medication 100 MICROGRAM(S): at 05:25

## 2019-06-13 RX ADMIN — Medication 6: at 11:33

## 2019-06-13 RX ADMIN — Medication 25 MILLIGRAM(S): at 05:25

## 2019-06-13 NOTE — PROGRESS NOTE ADULT - SUBJECTIVE AND OBJECTIVE BOX
INTERVAL HPI/OVERNIGHT EVENTS:  Pt stable.   Tolerating diet.   flatus, no BM    Vital Signs Last 24 Hrs  T(C): 36.8 (13 Jun 2019 06:00), Max: 36.8 (12 Jun 2019 13:00)  T(F): 98.2 (13 Jun 2019 06:00), Max: 98.2 (12 Jun 2019 13:00)  HR: 87 (13 Jun 2019 06:00) (76 - 100)  BP: 120/53 (13 Jun 2019 06:00) (104/86 - 154/63)  BP(mean): 90 (12 Jun 2019 21:38) (87 - 91)  RR: 16 (13 Jun 2019 06:00) (14 - 19)  SpO2: 95% (13 Jun 2019 06:00) (95% - 100%)    Physical:  Abdomen: Soft nondistended, nontender.  Port sites clean    I&O's Summary    12 Jun 2019 07:01  -  13 Jun 2019 07:00  --------------------------------------------------------  IN: 875 mL / OUT: 220 mL / NET: 655 mL        LABS:                        12.1   11.06 )-----------( 371      ( 13 Jun 2019 06:55 )             36.3             06-13    139  |  105  |  10  ----------------------------<  219<H>  4.0   |  28  |  0.85    Ca    8.6      13 Jun 2019 06:55    TPro  7.8  /  Alb  3.8  /  TBili  0.3  /  DBili  x   /  AST  9<L>  /  ALT  16  /  AlkPhos  110  06-12

## 2019-06-13 NOTE — DISCHARGE NOTE PROVIDER - CARE PROVIDER_API CALL
Wali Quezada)  Surgery  [address] [place] [address]  Phone: [phone]  Fax: [phone]  Follow Up Time:

## 2019-06-13 NOTE — DISCHARGE NOTE PROVIDER - NSDCCPCAREPLAN_GEN_ALL_CORE_FT
PRINCIPAL DISCHARGE DIAGNOSIS  Diagnosis: Acute appendicitis  Assessment and Plan of Treatment: May shower. Remove dressing in 24 hrs. Leave steristrips intact. Resume regular diet. No heavy lifting, straining, exercises for 2 weeks.      SECONDARY DISCHARGE DIAGNOSES  Diagnosis: Hyperglycemia  Assessment and Plan of Treatment: continue medications    Diagnosis: Hypothyroidism  Assessment and Plan of Treatment: continue medication    Diagnosis: Hypertension  Assessment and Plan of Treatment: continue medications

## 2019-06-13 NOTE — PROGRESS NOTE ADULT - ASSESSMENT
75 y/o female S/p laparoscopic appendectomy POD # 1    1. Regular diet   2. oob   3. prn pain control   4. d/c planning

## 2019-06-13 NOTE — PROGRESS NOTE ADULT - SUBJECTIVE AND OBJECTIVE BOX
POD#: 1    Subjective:74yFemale  S/p laparoscopic appendectomy POD # 1. Pt seen and examined at bedside. Offers no complaints     Vital Signs Last 24 Hrs  T(C): 36.8 (13 Jun 2019 06:00), Max: 36.8 (12 Jun 2019 13:00)  T(F): 98.2 (13 Jun 2019 06:00), Max: 98.2 (12 Jun 2019 13:00)  HR: 87 (13 Jun 2019 06:00) (76 - 100)  BP: 120/53 (13 Jun 2019 06:00) (104/86 - 154/63)  BP(mean): 90 (12 Jun 2019 21:38) (87 - 91)  RR: 16 (13 Jun 2019 06:00) (14 - 19)  SpO2: 95% (13 Jun 2019 06:00) (95% - 100%)  I&O's Detail    12 Jun 2019 07:01  -  13 Jun 2019 07:00  --------------------------------------------------------  IN:    Lactated Ringers IV Bolus: 800 mL    lactated ringers.: 75 mL  Total IN: 875 mL    OUT:    Estimated Blood Loss: 20 mL    Voided: 200 mL  Total OUT: 220 mL    Total NET: 655 mL          Physical Exam  General: AAOx3, No acute distress  Skin: No jaundice, no icterus  Adomen: soft, mild mesfin incisional tenderness, nondistended, no rebound tenderness, no guarding, no palpable masses  : Normal external genitalia  Extremities: non edematous, no calf pain bilaterally    Drains/Tubes:   VTE Prophylaxsis:    Labs:                        12.1   11.06 )-----------( 371      ( 13 Jun 2019 06:55 )             36.3     06-13    139  |  105  |  10  ----------------------------<  219<H>  4.0   |  28  |  0.85    Ca    8.6      13 Jun 2019 06:55    TPro  7.8  /  Alb  3.8  /  TBili  0.3  /  DBili  x   /  AST  9<L>  /  ALT  16  /  AlkPhos  110  06-12    PT/INR - ( 12 Jun 2019 18:15 )   PT: 11.6 sec;   INR: 1.04 ratio         PTT - ( 12 Jun 2019 18:15 )  PTT:33.8 sec        A/P: 74yFemale s/p

## 2019-06-13 NOTE — DISCHARGE NOTE NURSING/CASE MANAGEMENT/SOCIAL WORK - NSDCDPATPORTLINK_GEN_ALL_CORE
You can access the [facility] Patient Portal, offered by [facility], by registering with the following website: [email or web address]

## 2019-06-13 NOTE — DISCHARGE NOTE PROVIDER - HOSPITAL COURSE
61 y.o. F with PMH HTN, HLD, hypothyroidism and DM and PSH of open cholecystectomy and hysterectomy presented to [facility] ER c/o severe right sided abd pain. CT showed acute appendictiis. Pt underwent uncomlicated lap appendectomy same day. Pt stable post op, discharged POD#1.

## 2019-06-17 LAB — SURGICAL PATHOLOGY STUDY: SIGNIFICANT CHANGE UP

## 2019-06-19 PROBLEM — E78.5 HYPERLIPIDEMIA, UNSPECIFIED: Chronic | Status: ACTIVE | Noted: 2019-06-12

## 2019-06-20 PROBLEM — K37 APPENDICITIS, UNSPECIFIED APPENDICITIS TYPE: Status: ACTIVE | Noted: 2019-06-20

## 2019-06-24 ENCOUNTER — APPOINTMENT (OUTPATIENT)
Dept: SURGERY | Facility: CLINIC | Age: 74
End: 2019-06-24
Payer: MEDICARE

## 2019-06-24 VITALS
WEIGHT: 146 LBS | HEART RATE: 76 BPM | BODY MASS INDEX: 25.87 KG/M2 | DIASTOLIC BLOOD PRESSURE: 77 MMHG | HEIGHT: 63 IN | SYSTOLIC BLOOD PRESSURE: 121 MMHG

## 2019-06-24 DIAGNOSIS — Z63.4 DISAPPEARANCE AND DEATH OF FAMILY MEMBER: ICD-10-CM

## 2019-06-24 DIAGNOSIS — Z87.891 PERSONAL HISTORY OF NICOTINE DEPENDENCE: ICD-10-CM

## 2019-06-24 DIAGNOSIS — Z80.0 FAMILY HISTORY OF MALIGNANT NEOPLASM OF DIGESTIVE ORGANS: ICD-10-CM

## 2019-06-24 DIAGNOSIS — K37 UNSPECIFIED APPENDICITIS: ICD-10-CM

## 2019-06-24 DIAGNOSIS — Z82.49 FAMILY HISTORY OF ISCHEMIC HEART DISEASE AND OTHER DISEASES OF THE CIRCULATORY SYSTEM: ICD-10-CM

## 2019-06-24 PROCEDURE — 99024 POSTOP FOLLOW-UP VISIT: CPT

## 2019-06-24 SDOH — SOCIAL STABILITY - SOCIAL INSECURITY: DISSAPEARANCE AND DEATH OF FAMILY MEMBER: Z63.4

## 2019-06-24 NOTE — ASSESSMENT
[FreeTextEntry1] : Patient is doing well, with excellent post-operative recovery. All surgical incisions are healing well and as expected. There is no evidence of infection or complication, and patient is progressing as expected. Post-operative wound care, activity, restrictions and precautions reinforced.   path discussed.  Patient's questions and concerns addressed to patient's satisfaction.\par

## 2019-06-24 NOTE — DATA REVIEWED
[FreeTextEntry1] : JUDY CAMARGO                      1\par \par \par \par Surgical Final Report\par \par \par \par \par Final Diagnosis\par \par Appendix; laparoscopic appendectomy:\par - Acute suppurative appendicitis with periappendicitis.\par \par Verified by: Breanna Mendez MD\par (Electronic Signature)\par Reported on: 06/17/19 13:30 EDT, [address] [place] [address], [place] [address]\par _________________________________________________________________\par \par Clinical History\par Acute appendicitis\par Laparoscopic appendectomy

## 2019-06-24 NOTE — PHYSICAL EXAM
[de-identified] : The patient is alert, well-groomed, and cheerful. [de-identified] : Surgical wounds are  healing well.   no signs of  inflammation or infection.

## 2019-06-24 NOTE — PLAN
[FreeTextEntry1] : patient will follow up if needed. Warning signs, follow up, and restrictions were discussed with the patient.

## 2019-06-24 NOTE — HISTORY OF PRESENT ILLNESS
[de-identified] : Patient is s/p Laparoscopic appendectomy on 06/12/2019. pathology results are consistent with Acute suppurative appendicitis with periappendicitis. Today patient offers no complaints. patient reports no fever, chills,  or  pain.  Surgical wounds are  healing well. No signs of inflammation, infection or exudate. Patient reports good bowel movements and appetite.  negative...

## 2020-09-16 NOTE — ED PROVIDER NOTE - RELATIONSHIP TO PATIENT
Rehab Note- Spoke with Phuong with [facility] and stated that she had approved a
SNF stay and that she questioned if the patient was able to tolerate the 3hrs
of therapy a day, gave a verbal that the patient is able to tolerate both PT
and OT. Stated she will expedit case to medical director after I stated I had
been attempting to get Auth on Friday 9/11. Will continue to await
determination at this time.
Nessa Tidwell RN
Clinical Liaison, [facility]

## 2021-07-09 NOTE — ED PROVIDER NOTE - RESPIRATORY, MLM
Breath sounds clear and equal bilaterally. Intermediate Repair Preamble Text (Leave Blank If You Do Not Want): Undermining was performed with blunt dissection.

## 2021-07-10 NOTE — ED PROVIDER NOTE - NEUROLOGICAL, MLM
Alert and oriented, no focal deficits, no motor or sensory deficits.
Principal Discharge DX:	Ovarian cyst

## 2021-11-23 NOTE — ED ADULT NURSE NOTE - OBJECTIVE STATEMENT
Pt continues to watch T.V. Pt was given coffee and said thank you. Pt plesant 
and calm. pt brought in by friend for RUQ  abdominal pain radiating to the back 8/10 starting 2 days ago. Pt is not associated with N&V. Normal BM this morning. Otherwise no other complaints

## 2022-01-18 NOTE — ED PROVIDER NOTE - CARDIOVASCULAR [-], MLM
The patient's assessment was reviewed with Dr. Sinclair and a collaborative plan of care was established. no chest pain/no palpitations

## 2022-05-03 NOTE — PATIENT PROFILE ADULT - FUNCTIONAL SCREEN CURRENT LEVEL: DRESSING, MLM
eMERGENCY dEPARTMENT eNCOUnter      CHIEF COMPLAINT    Chief Complaint   Patient presents with   • Shortness of Breath       HPI    Stacy Rivera is a 57 year old female who presents complaining of shortness of breath for 1 week.  Can not walk 50 yd without feeling short of breath.  Past medical history significant for clotting as well as cancer.  Recently had flight travel but that is with the last couple days and she has been short of breath for over a week.  She is on anticoagulation a reports compliance with such.  She reports dyspnea exertion as well as orthopnea  Denies any chest pain  ALLERGIES    ALLERGIES:  No Known Allergies    CURRENT MEDICATIONS    No current facility-administered medications for this encounter.     Current Outpatient Medications   Medication Sig Dispense Refill   • Eliquis 5 MG Tab Take 1 tablet by mouth every 12 hours. 60 tablet 5   • custom magic mouthwash oral suspension Magic Mouthwash- Take 10 mLs by mouth and gargle and swallow 15 mins prior to eating. 335 mL 3   • prochlorperazine (COMPAZINE) 10 MG tablet Take 1 tablet by mouth every 6 hours as needed for Nausea or Vomiting. 30 tablet 5   • dexamethasone (DECADRON) 4 MG tablet Take 1 tablet by mouth 2 times daily (with meals). Take for 3 days starting the day before chemotherapy. 24 tablet 0   • OLANZapine (ZyPREXA) 5 MG tablet Take 1 tablet by mouth nightly. Start the day of chemotherapy x 4 days for prevention of nausea/vomiting. 8 tablet 3   • lidocaine-prilocaine (EMLA) cream Apply to Mediport site 1 hour prior to access procedure 1 each 3   • Multiple Vitamins-Minerals (CENTRUM ADULTS PO) Take 1 tablet by mouth daily.     • loratadine (Claritin) 10 MG tablet Take 10 mg by mouth daily.      • calcium carbonate (Calcium 600) 600 MG Tab Take 600 mg by mouth daily.      • Cholecalciferol (Vitamin D3) 125 mcg (5,000 units) tablet Take 125 mcg by mouth daily.     • ELDERBERRY PO Take 1 packet by mouth daily.     • acetaminophen  (TYLENOL) 500 MG tablet Take 1,000 mg by mouth daily as needed for Pain.     • ascorbic acid (VITAMIN C) 500 MG tablet Take 500 mg by mouth daily.     • CANNABIDIOL PO Take 1 capsule by mouth daily.         PAST MEDICAL HISTORY    Past Medical History:   Diagnosis Date   • Blood clot associated with vein wall inflammation     Pulmonary embolism   • Chronic pain    • Fracture    • History of adverse response to anesthesia     H/A after surgery in the past   • Malignant neoplasm (CMS/HCC)        PROBLEM LIST  Patient Active Problem List   Diagnosis   • Pulmonary embolism with acute cor pulmonale (CMS/HCC)   • Tobacco use disorder   • Acute respiratory failure with hypoxia (CMS/HCC)   • DVT, lower extremity, proximal, acute, right (CMS/HCC), Provoked   • DVT, lower extremity, distal, acute, left (CMS/HCC)   • Pleural effusion on right   • Hypoxic   • Fever   • Volume overload   • SIRS of non-infectious origin w acute organ dysfunction (CMS/HCC)   • Primary lung adenocarcinoma (CMS/HCC)   • Congestive heart failure (CMS/HCC)       SURGICAL HISTORY    Past Surgical History:   Procedure Laterality Date   • Hysterectomy     • Removal gallbladder         SOCIAL HISTORY    Social History     Tobacco Use   • Smoking status: Former Smoker     Packs/day: 1.00     Years: 40.00     Pack years: 40.00     Types: Cigarettes     Quit date: 2021     Years since quittin.6   • Smokeless tobacco: Never Used   Vaping Use   • Vaping Use: never used   Substance Use Topics   • Alcohol use: Yes     Comment: Occasional   • Drug use: Never       FAMILY HISTORY    Family History   Problem Relation Age of Onset   • Cancer Mother         Liver   • Diabetes Mother    • Cancer, Lung Maternal Aunt 60       REVIEW OF SYSTEMS    Negative not pertinent noncontributory for all remaining 13 systems other than as stated above    PHYSICAL EXAM    ED Triage Vitals [22 1641]   /76   Heart Rate (!) 118   Resp 20   Temp 98.2 °F (36.8  °C)   SpO2 92 %         Patient is alert orientated to person, place and time    Head:  normal cephalic, atraumatic    Eyes:  PERRLA, EOMI    Mucous Membranes: moist, no lesions    Oral pharynx:  noninjected    Neck: supple, no adenopathy    Heart:  Regular rate and rhythm, S1 S2, No murmers rubs or clicks    Lungs: Diminished in the left base  Chest wall shows a port without any evidence of infection  Abdomen:  Soft, nontender. No hepatosplenomegaly. No bruits. Bowel sounds present in all 4 quadrants   Negative Augustin sign, negative McBurney's point tenderness  No rebound no guarding    Extremities: No clubbing, cyanosis or edema negative Homans sign    Skin: Is without cutaneous manifestations of systemic disease    Neurological: Cranial Nerves 2-12 grossly intact.  No motor or sensory deficients                 EKG     Indication shortness of breath    Sinus tachycardia otherwise normal EKG no changes from previous EKG    RADIOLOGY    XR Chest AP or PA   Final Result   FINDINGS AND IMPRESSION:    Right IJ port catheter in place with its tip in the distal SVC. EKG leads   are seen overlying the chest.      The cardiomediastinal silhouette is well-maintained.      Borderline prominence of heart size is stable. Pulmonary vascular   congestion changes have improved.      Previously seen prominence of pulmonary interstitial markings has   decreased. Ill-defined haziness in the left perihilar region and left mid   to lower lung has mostly resolved. No airspace consolidation. No lobar or   segmental atelectasis.      There is no pleural effusion or pneumothorax bilaterally.      The visualized bones and soft tissues are unremarkable.                        NM Lung Scan    (Results Pending)   Indication shortness of breath    LABS    Results for orders placed or performed during the hospital encounter of 05/03/22   Comprehensive Metabolic Panel   Result Value    Fasting Status     Sodium 142    Potassium 4.0    Chloride  108 (H)    Carbon Dioxide 24    Anion Gap 14    Glucose 103 (H)    BUN 15    Creatinine 0.84    Glomerular Filtration Rate 82     Comment: eGFR results = or >60 mL/min/1.73m2 = Normal kidney function. Estimated GFR calculated using the CKD-EPI-R (2021) equation that does not include race in the creatinine calculation.    BUN/ Creatinine Ratio 18    Calcium 9.3    Bilirubin, Total 0.4    GOT/AST 26    GPT/ALT 36    Alkaline Phosphatase 88    Albumin 3.1 (L)    Protein, Total 7.2    Globulin 4.1 (H)    A/G Ratio 0.8 (L)   NT proBNP   Result Value    NT-proBNP 2,058 (H)   TROPONIN I, HIGH SENSITIVITY   Result Value    Troponin I, High Sensitivity 30   CBC with Automated Differential (performable only)   Result Value    WBC 7.5    RBC 3.27 (L)    HGB 10.3 (L)    HCT 32.0 (L)    MCV 97.9    MCH 31.5    MCHC 32.2    RDW-CV 14.0    RDW-SD 49.8        NRBC 0    Neutrophil, Percent 73    Lymphocytes, Percent 9    Mono, Percent 12    Eosinophils, Percent 4    Basophils, Percent 1    Immature Granulocytes 1    Absolute Neutrophils 5.5    Absolute Lymphocytes 0.7 (L)    Absolute Monocytes 0.9    Absolute Eosinophils  0.3    Absolute Basophils 0.1    Absolute Immmature Granulocytes 0.1   Electrocardiogram 12-Lead   Result Value    Ventricular Rate EKG/Min (BPM) 105    Atrial Rate (BPM) 105    CA-Interval (MSEC) 134    QRS-Interval (MSEC) 76    QT-Interval (MSEC) 342    QTc 452    P Axis (Degrees) 63    R Axis (Degrees) 67    T Axis (Degrees) 40    REPORT TEXT      Sinus tachycardia  Otherwise normal ECG  When compared with ECG of  16-DEC-2021 10:58,  No significant change was found  Confirmed by LORETTA SU M.D. ([id number]),  Alyssa Harrell ([id number]) on 5/3/2022 5:15:32 PM     Rapid SARS-CoV-2 by PCR    Specimen: Nasal, Mid-turbinate; Swab   Result Value    Rapid SARS-COV-2 by PCR Not Detected    Isolation Guidelines      Comment: Do not use this test result as the sole decision-maker for discontinuation of  isolation.   Clinical evaluation should be considered for other respiratory illness requiring transmission-based isolation.    -    No fever (<99.0 F/37.2 C) for at least 24 hours without the use of fever-reducing medications    AND  -    Respiratory symptoms have improved or resolved (e.g. cough, shortness of breath)     AND  -    COVID-19 negative test    See COVID-19 Deisolation Resource Guide    Procedural Comment      Comment: SARS-CoV-2 nucleic acid has not been detected indicating the absence of COVID-19.       Testing was performed using the [employer] Xpert Xpress SARS-CoV-2 RT-PCR assay that has been given Emergency Use Authorization (EUA) by the [place] Food and Drug Administration (FDA).  These results are considered definitive and do not need to be confirmed by another method.         ED MEDICATIONS  ED Medication Orders (From admission, onward)    Ordered Start     Status Ordering Provider    05/03/22 1834 05/03/22 1835  furosemide (LASIX INJECT) injection 40 mg  ONCE         Last MAR action: Given LORETTA SU          PROCEDURES        CONSULTS:      ED COURSE & MEDICAL DECISION MAKING         Patient with classic CHF type symptoms elevated BNP.  Will give Lasix.  No gross pleural effusion.  She has had failure in the past with Eliquis but it has been due to noncompliance.  Doubt that she has PE      RECHECKS:  Patient was given 40 mg of Lasix in the emergency department just ambulating to the bathroom she desats in the low 80s.  She says at home she desatted into the 70s.  Will admit her.  Due to a nationwide shortage of IV contrast what to do a V/Q scan on her.  She is going to me in for long workup.  Depending with V/Q scan shows will determine whether not she is going to need an echo and further interventions.  FINAL IMPRESSION        The primary encounter diagnosis was Congestive heart failure, unspecified HF chronicity, unspecified heart failure type (CMS/HCC). Diagnoses of History of  pulmonary embolism and Primary adenocarcinoma of lung, unspecified laterality (CMS/HCC) were also pertinent to this visit.         Felix Chatman MD  05/03/22 1946     2 = assistive person

## 2022-09-07 ENCOUNTER — INPATIENT (INPATIENT)
Facility: HOSPITAL | Age: 77
LOS: 7 days | Discharge: HOME | DRG: 872 | End: 2022-09-15
Attending: INTERNAL MEDICINE | Admitting: INTERNAL MEDICINE
Payer: COMMERCIAL

## 2022-09-07 VITALS
OXYGEN SATURATION: 99 % | RESPIRATION RATE: 18 BRPM | WEIGHT: 134.92 LBS | HEIGHT: 63 IN | DIASTOLIC BLOOD PRESSURE: 53 MMHG | TEMPERATURE: 99 F | HEART RATE: 91 BPM | SYSTOLIC BLOOD PRESSURE: 97 MMHG

## 2022-09-07 DIAGNOSIS — A41.9 SEPSIS, UNSPECIFIED ORGANISM: ICD-10-CM

## 2022-09-07 DIAGNOSIS — N17.9 ACUTE KIDNEY FAILURE, UNSPECIFIED: ICD-10-CM

## 2022-09-07 DIAGNOSIS — E78.5 HYPERLIPIDEMIA, UNSPECIFIED: ICD-10-CM

## 2022-09-07 DIAGNOSIS — E11.9 TYPE 2 DIABETES MELLITUS WITHOUT COMPLICATIONS: ICD-10-CM

## 2022-09-07 DIAGNOSIS — I10 ESSENTIAL (PRIMARY) HYPERTENSION: ICD-10-CM

## 2022-09-07 DIAGNOSIS — R55 SYNCOPE AND COLLAPSE: ICD-10-CM

## 2022-09-07 DIAGNOSIS — E87.8 OTHER DISORDERS OF ELECTROLYTE AND FLUID BALANCE, NOT ELSEWHERE CLASSIFIED: ICD-10-CM

## 2022-09-07 DIAGNOSIS — E03.9 HYPOTHYROIDISM, UNSPECIFIED: ICD-10-CM

## 2022-09-07 DIAGNOSIS — S02.2XXA FRACTURE OF NASAL BONES, INITIAL ENCOUNTER FOR CLOSED FRACTURE: ICD-10-CM

## 2022-09-07 DIAGNOSIS — Z29.9 ENCOUNTER FOR PROPHYLACTIC MEASURES, UNSPECIFIED: ICD-10-CM

## 2022-09-07 DIAGNOSIS — Z91.89 OTHER SPECIFIED PERSONAL RISK FACTORS, NOT ELSEWHERE CLASSIFIED: ICD-10-CM

## 2022-09-07 LAB
ACETONE SERPL-MCNC: NEGATIVE — SIGNIFICANT CHANGE UP
ALBUMIN SERPL ELPH-MCNC: 2.8 G/DL — LOW (ref 3.5–5)
ALP SERPL-CCNC: 66 U/L — SIGNIFICANT CHANGE UP (ref 40–120)
ALT FLD-CCNC: 16 U/L DA — SIGNIFICANT CHANGE UP (ref 10–60)
ANION GAP SERPL CALC-SCNC: 11 MMOL/L — SIGNIFICANT CHANGE UP (ref 5–17)
APPEARANCE UR: ABNORMAL
AST SERPL-CCNC: 15 U/L — SIGNIFICANT CHANGE UP (ref 10–40)
BACTERIA # UR AUTO: ABNORMAL /HPF
BASOPHILS # BLD AUTO: 0.05 K/UL — SIGNIFICANT CHANGE UP (ref 0–0.2)
BASOPHILS NFR BLD AUTO: 0.2 % — SIGNIFICANT CHANGE UP (ref 0–2)
BILIRUB SERPL-MCNC: 0.5 MG/DL — SIGNIFICANT CHANGE UP (ref 0.2–1.2)
BILIRUB UR-MCNC: NEGATIVE — SIGNIFICANT CHANGE UP
BUN SERPL-MCNC: 28 MG/DL — HIGH (ref 7–18)
CALCIUM SERPL-MCNC: 8.3 MG/DL — LOW (ref 8.4–10.5)
CHLORIDE SERPL-SCNC: 96 MMOL/L — SIGNIFICANT CHANGE UP (ref 96–108)
CK SERPL-CCNC: 149 U/L — SIGNIFICANT CHANGE UP (ref 21–215)
CO2 SERPL-SCNC: 23 MMOL/L — SIGNIFICANT CHANGE UP (ref 22–31)
COLOR SPEC: YELLOW — SIGNIFICANT CHANGE UP
CREAT SERPL-MCNC: 2.09 MG/DL — HIGH (ref 0.5–1.3)
DIFF PNL FLD: ABNORMAL
EGFR: 24 ML/MIN/1.73M2 — LOW
EOSINOPHIL # BLD AUTO: 0.05 K/UL — SIGNIFICANT CHANGE UP (ref 0–0.5)
EOSINOPHIL NFR BLD AUTO: 0.2 % — SIGNIFICANT CHANGE UP (ref 0–6)
EPI CELLS # UR: ABNORMAL /HPF
GLUCOSE SERPL-MCNC: 227 MG/DL — HIGH (ref 70–99)
GLUCOSE UR QL: NEGATIVE — SIGNIFICANT CHANGE UP
HCT VFR BLD CALC: 34.8 % — SIGNIFICANT CHANGE UP (ref 34.5–45)
HGB BLD-MCNC: 12.1 G/DL — SIGNIFICANT CHANGE UP (ref 11.5–15.5)
HIV 1 & 2 AB SERPL IA.RAPID: SIGNIFICANT CHANGE UP
IMM GRANULOCYTES NFR BLD AUTO: 0.6 % — SIGNIFICANT CHANGE UP (ref 0–1.5)
KETONES UR-MCNC: NEGATIVE — SIGNIFICANT CHANGE UP
LACTATE SERPL-SCNC: 3.2 MMOL/L — HIGH (ref 0.7–2)
LEUKOCYTE ESTERASE UR-ACNC: ABNORMAL
LIDOCAIN IGE QN: 98 U/L — SIGNIFICANT CHANGE UP (ref 73–393)
LYMPHOCYTES # BLD AUTO: 1.37 K/UL — SIGNIFICANT CHANGE UP (ref 1–3.3)
LYMPHOCYTES # BLD AUTO: 6.5 % — LOW (ref 13–44)
MAGNESIUM SERPL-MCNC: 1 MG/DL — CRITICAL LOW (ref 1.6–2.6)
MCHC RBC-ENTMCNC: 30.4 PG — SIGNIFICANT CHANGE UP (ref 27–34)
MCHC RBC-ENTMCNC: 34.8 GM/DL — SIGNIFICANT CHANGE UP (ref 32–36)
MCV RBC AUTO: 87.4 FL — SIGNIFICANT CHANGE UP (ref 80–100)
MONOCYTES # BLD AUTO: 1.33 K/UL — HIGH (ref 0–0.9)
MONOCYTES NFR BLD AUTO: 6.3 % — SIGNIFICANT CHANGE UP (ref 2–14)
NEUTROPHILS # BLD AUTO: 18.17 K/UL — HIGH (ref 1.8–7.4)
NEUTROPHILS NFR BLD AUTO: 86.2 % — HIGH (ref 43–77)
NITRITE UR-MCNC: NEGATIVE — SIGNIFICANT CHANGE UP
NRBC # BLD: 0 /100 WBCS — SIGNIFICANT CHANGE UP (ref 0–0)
NT-PROBNP SERPL-SCNC: 903 PG/ML — HIGH (ref 0–450)
OSMOLALITY SERPL: 279 MOSMOL/KG — LOW (ref 280–301)
PH UR: 6 — SIGNIFICANT CHANGE UP (ref 5–8)
PLATELET # BLD AUTO: 253 K/UL — SIGNIFICANT CHANGE UP (ref 150–400)
POTASSIUM SERPL-MCNC: 3.2 MMOL/L — LOW (ref 3.5–5.3)
POTASSIUM SERPL-SCNC: 3.2 MMOL/L — LOW (ref 3.5–5.3)
PROT SERPL-MCNC: 7 G/DL — SIGNIFICANT CHANGE UP (ref 6–8.3)
PROT UR-MCNC: 100
RBC # BLD: 3.98 M/UL — SIGNIFICANT CHANGE UP (ref 3.8–5.2)
RBC # FLD: 13.3 % — SIGNIFICANT CHANGE UP (ref 10.3–14.5)
RBC CASTS # UR COMP ASSIST: ABNORMAL /HPF (ref 0–2)
SARS-COV-2 RNA SPEC QL NAA+PROBE: SIGNIFICANT CHANGE UP
SODIUM SERPL-SCNC: 130 MMOL/L — LOW (ref 135–145)
SP GR SPEC: 1.02 — SIGNIFICANT CHANGE UP (ref 1.01–1.02)
TROPONIN I, HIGH SENSITIVITY RESULT: 12.2 NG/L — SIGNIFICANT CHANGE UP
UROBILINOGEN FLD QL: NEGATIVE — SIGNIFICANT CHANGE UP
WBC # BLD: 21.09 K/UL — HIGH (ref 3.8–10.5)
WBC # FLD AUTO: 21.09 K/UL — HIGH (ref 3.8–10.5)
WBC UR QL: >50 /HPF (ref 0–5)

## 2022-09-07 PROCEDURE — 70450 CT HEAD/BRAIN W/O DYE: CPT | Mod: 26,MA

## 2022-09-07 PROCEDURE — 71045 X-RAY EXAM CHEST 1 VIEW: CPT | Mod: 26

## 2022-09-07 PROCEDURE — 70486 CT MAXILLOFACIAL W/O DYE: CPT | Mod: 26,MA

## 2022-09-07 PROCEDURE — 99291 CRITICAL CARE FIRST HOUR: CPT

## 2022-09-07 RX ORDER — MECLIZINE HCL 12.5 MG
12.5 TABLET ORAL ONCE
Refills: 0 | Status: COMPLETED | OUTPATIENT
Start: 2022-09-07 | End: 2022-09-07

## 2022-09-07 RX ORDER — LEVOTHYROXINE SODIUM 125 MCG
1 TABLET ORAL
Qty: 0 | Refills: 0 | DISCHARGE

## 2022-09-07 RX ORDER — ATORVASTATIN CALCIUM 80 MG/1
1 TABLET, FILM COATED ORAL
Qty: 0 | Refills: 0 | DISCHARGE

## 2022-09-07 RX ORDER — SODIUM CHLORIDE 9 MG/ML
2000 INJECTION INTRAMUSCULAR; INTRAVENOUS; SUBCUTANEOUS ONCE
Refills: 0 | Status: COMPLETED | OUTPATIENT
Start: 2022-09-07 | End: 2022-09-07

## 2022-09-07 RX ORDER — PIOGLITAZONE HYDROCHLORIDE 15 MG/1
1 TABLET ORAL
Qty: 0 | Refills: 0 | DISCHARGE

## 2022-09-07 RX ORDER — CEFTRIAXONE 500 MG/1
1000 INJECTION, POWDER, FOR SOLUTION INTRAMUSCULAR; INTRAVENOUS ONCE
Refills: 0 | Status: COMPLETED | OUTPATIENT
Start: 2022-09-07 | End: 2022-09-07

## 2022-09-07 RX ORDER — METFORMIN HYDROCHLORIDE 850 MG/1
1 TABLET ORAL
Qty: 0 | Refills: 0 | DISCHARGE

## 2022-09-07 RX ORDER — AZITHROMYCIN 500 MG/1
500 TABLET, FILM COATED ORAL ONCE
Refills: 0 | Status: COMPLETED | OUTPATIENT
Start: 2022-09-07 | End: 2022-09-07

## 2022-09-07 RX ORDER — SODIUM CHLORIDE 9 MG/ML
1000 INJECTION INTRAMUSCULAR; INTRAVENOUS; SUBCUTANEOUS ONCE
Refills: 0 | Status: COMPLETED | OUTPATIENT
Start: 2022-09-07 | End: 2022-09-07

## 2022-09-07 RX ORDER — ACETAMINOPHEN 500 MG
650 TABLET ORAL ONCE
Refills: 0 | Status: COMPLETED | OUTPATIENT
Start: 2022-09-07 | End: 2022-09-07

## 2022-09-07 RX ORDER — SODIUM CHLORIDE 9 MG/ML
1000 INJECTION INTRAMUSCULAR; INTRAVENOUS; SUBCUTANEOUS
Refills: 0 | Status: DISCONTINUED | OUTPATIENT
Start: 2022-09-07 | End: 2022-09-08

## 2022-09-07 RX ORDER — ONDANSETRON 8 MG/1
4 TABLET, FILM COATED ORAL EVERY 8 HOURS
Refills: 0 | Status: DISCONTINUED | OUTPATIENT
Start: 2022-09-07 | End: 2022-09-15

## 2022-09-07 RX ORDER — MAGNESIUM SULFATE 500 MG/ML
2 VIAL (ML) INJECTION ONCE
Refills: 0 | Status: COMPLETED | OUTPATIENT
Start: 2022-09-07 | End: 2022-09-07

## 2022-09-07 RX ORDER — POTASSIUM CHLORIDE 20 MEQ
10 PACKET (EA) ORAL
Refills: 0 | Status: COMPLETED | OUTPATIENT
Start: 2022-09-07 | End: 2022-09-07

## 2022-09-07 RX ORDER — ACETAMINOPHEN 500 MG
1000 TABLET ORAL ONCE
Refills: 0 | Status: COMPLETED | OUTPATIENT
Start: 2022-09-07 | End: 2022-09-07

## 2022-09-07 RX ORDER — METOCLOPRAMIDE HCL 10 MG
10 TABLET ORAL ONCE
Refills: 0 | Status: COMPLETED | OUTPATIENT
Start: 2022-09-07 | End: 2022-09-07

## 2022-09-07 RX ORDER — ATORVASTATIN CALCIUM 80 MG/1
2 TABLET, FILM COATED ORAL
Qty: 0 | Refills: 0 | DISCHARGE

## 2022-09-07 RX ORDER — CEFTRIAXONE 500 MG/1
1000 INJECTION, POWDER, FOR SOLUTION INTRAMUSCULAR; INTRAVENOUS EVERY 24 HOURS
Refills: 0 | Status: DISCONTINUED | OUTPATIENT
Start: 2022-09-07 | End: 2022-09-08

## 2022-09-07 RX ORDER — LOSARTAN POTASSIUM 100 MG/1
1 TABLET, FILM COATED ORAL
Qty: 0 | Refills: 0 | DISCHARGE

## 2022-09-07 RX ORDER — HEPARIN SODIUM 5000 [USP'U]/ML
5000 INJECTION INTRAVENOUS; SUBCUTANEOUS EVERY 8 HOURS
Refills: 0 | Status: DISCONTINUED | OUTPATIENT
Start: 2022-09-07 | End: 2022-09-15

## 2022-09-07 RX ORDER — METOPROLOL TARTRATE 50 MG
1 TABLET ORAL
Qty: 0 | Refills: 0 | DISCHARGE

## 2022-09-07 RX ADMIN — Medication 100 MILLIEQUIVALENT(S): at 19:56

## 2022-09-07 RX ADMIN — Medication 100 MILLIEQUIVALENT(S): at 23:12

## 2022-09-07 RX ADMIN — CEFTRIAXONE 100 MILLIGRAM(S): 500 INJECTION, POWDER, FOR SOLUTION INTRAMUSCULAR; INTRAVENOUS at 17:06

## 2022-09-07 RX ADMIN — Medication 400 MILLIGRAM(S): at 21:07

## 2022-09-07 RX ADMIN — Medication 12.5 MILLIGRAM(S): at 17:06

## 2022-09-07 RX ADMIN — SODIUM CHLORIDE 75 MILLILITER(S): 9 INJECTION INTRAMUSCULAR; INTRAVENOUS; SUBCUTANEOUS at 19:56

## 2022-09-07 RX ADMIN — Medication 100 MILLIEQUIVALENT(S): at 20:09

## 2022-09-07 RX ADMIN — HEPARIN SODIUM 5000 UNIT(S): 5000 INJECTION INTRAVENOUS; SUBCUTANEOUS at 20:08

## 2022-09-07 RX ADMIN — SODIUM CHLORIDE 4000 MILLILITER(S): 9 INJECTION INTRAMUSCULAR; INTRAVENOUS; SUBCUTANEOUS at 17:07

## 2022-09-07 RX ADMIN — Medication 650 MILLIGRAM(S): at 18:23

## 2022-09-07 RX ADMIN — Medication 104 MILLIGRAM(S): at 17:08

## 2022-09-07 RX ADMIN — Medication 25 GRAM(S): at 18:35

## 2022-09-07 RX ADMIN — AZITHROMYCIN 255 MILLIGRAM(S): 500 TABLET, FILM COATED ORAL at 18:35

## 2022-09-07 RX ADMIN — Medication 25 GRAM(S): at 19:00

## 2022-09-07 RX ADMIN — Medication 650 MILLIGRAM(S): at 17:06

## 2022-09-07 RX ADMIN — SODIUM CHLORIDE 2000 MILLILITER(S): 9 INJECTION INTRAMUSCULAR; INTRAVENOUS; SUBCUTANEOUS at 17:06

## 2022-09-07 NOTE — H&P ADULT - ATTENDING COMMENTS
78 y/o F from home w/ PMHx of HTN, DM, HLD, Hypothyroidism, s/p cholecystectomy, s/p hysterectomy. She had been having generalized weakness, dizziness and dysuria for the past 3 days. In the morning of admission, she was in the bathroom for an episode of non-foul smelling watery diarrhea with associated lower abdominal pain. She was having another episode of dizziness causing her to fall and hit her face. She developed abrasions on the face near the bridge of the nose. She also had an episode of nonbloody, non-bilous vomiting. She denies any headache, blurring of vision, chest pain, shortness of breath, palpitation. In the ED, her VS were stable. She was noted to have an elevated WBC of 21.09, Lactate of 3.2, Cr of 2.09, low Na of 130, K of 3.2, Mg of 1.0. CT Head was negative. CT Maxillofacial sinus is negative showing slight L nasal displacement. She was given 3L bolus of NS and a dose of Rocephin and Azithromycin. Of note, she is also taking multiple antibiotics including Bactrim and Levaquin for UTI, Metronidazole for diarrhea and Fluconazole for urinary candidiasis.    GOC: FULL CODE        assessment  --  sepsis, uti, electrolyte imbalance, joseph, syncope, s/p fall, nasal fx, diarrhea, r/o c diff, h/o HTN, DM, HLD, Hypothyroidism, s/p cholecystectomy, s/p hysterectomy    plan  --  admit to tele, rocephin, bacid tid, lispro ss, cont preadmit home meds, gi and dvt prophylaxis, supplement mag, potassium, ivf  cbc, bmp, mg, phos, lipids, tsh, hgba1c, bld cx, ucx, stool studies including c diff    orthostatic bp    ct abd-pelv    id cons  cardio cons

## 2022-09-07 NOTE — ED PROVIDER NOTE - RESPIRATORY NEGATIVE STATEMENT, MLM
cachectic with temporal wasting  last ALb 3.0  on admission 1.9  likely 2/2 ETOH vs malignancy?  diet as tolerated, consider supplements with meals
no chest pain, no cough, and no shortness of breath.

## 2022-09-07 NOTE — H&P ADULT - PROBLEM SELECTOR PLAN 1
p/w dysuria, generalized weakness, poor appetite  Hx of recurrent UTI  WBC - 21, Lactate - 3.2  meets sepsis criteria  s/p 3L NS bolus  s/p Rocephin + Azithromycin  Start IVF NS at 75 cc/hr  f/u UA, UCx, BCx  ID (Dr. Rodriguez) consulted p/w dysuria, generalized weakness, poor appetite  WBC - 21, Lactate - 3.2  meets sepsis criteria  s/p 3L NS bolus  s/p Rocephin + Azithromycin  Start IVF NS at 75 cc/hr  d/c all outpatient antibiotics as some symptoms can be due to them (nausea/vomiting/diarrhea)  Start Rocephin IV  f/u UA, UCx, BCx  ID (Dr. Rodriguez) consulted

## 2022-09-07 NOTE — H&P ADULT - ASSESSMENT
Patient is a 76 y/o F from home w/ PMHx of HTN, DM, HLD, Hypothyroidism, s/p cholecystectomy, s/p hysterectomy. Admitted for sepsis 2/2 UTI

## 2022-09-07 NOTE — ED PROVIDER NOTE - CARE PLAN
1 Principal Discharge DX:	Sepsis  Secondary Diagnosis:	Hypomagnesemia  Secondary Diagnosis:	Hyponatremia  Secondary Diagnosis:	Vertigo  Secondary Diagnosis:	Nasal bone fracture

## 2022-09-07 NOTE — ED ADULT NURSE NOTE - OBJECTIVE STATEMENT
Patient came to the ED a/o x 3 accompanied by family at bedside c/o dizziness and weakness. s/p fall x today + ecchymosis in the face (nose & eyes). Pt is currently being treated for a urine infection.

## 2022-09-07 NOTE — H&P ADULT - NSHPPHYSICALEXAM_GEN_ALL_CORE
Vital Signs  · BP - 97/53 mm Hg  · Heart Rate - 91 /min  · Respiration Rate - 18 /min  · Temp - 98.9 F (37.2 C)  · SpO2 (%) - 99 %    PHYSICAL EXAM:  GENERAL: NAD, speaks in full sentences, no signs of respiratory distress  HEAD:  Atraumatic, Normocephalic  EYES: EOMI, PERRLA, conjunctiva and sclera clear  NECK: Supple, No JVD  CHEST/LUNG: Clear to auscultation bilaterally; No wheeze; No crackles; No accessory muscles used  HEART: Regular rate and rhythm; No murmurs;   ABDOMEN: Soft, (+) mild lower abdominal tenderness, Nondistended; Bowel sounds present; No guarding  EXTREMITIES:  2+ Peripheral Pulses, No cyanosis or edema  PSYCH: AAOx3  NEUROLOGY: non-focal  SKIN: No rashes or lesions

## 2022-09-07 NOTE — H&P ADULT - PROBLEM SELECTOR PLAN 3
p/w BUN/SCr p/w BUN/SCr - 28/2.09 (unknown baseline)  could be pre-renal from dehydration, poor oral intake, vomiting, diarrhea  s/p 3L NS bolus  Start IVF NS at 75 cc/hr  monitor BMP  avoid nephrotoxic substances

## 2022-09-07 NOTE — ED PROVIDER NOTE - CADM POA CENTRAL LINE
, , not at goal.  On lipitor 40. Resume fish oil, 2 gm BID. Consider adding Zetia in the future vs increasing dose of lipitor. LFTs elevated. Has fatty liver. Monitor LFTs.  
BG not at goal.  HbA1C above goal  Change Lantus to 35 units daily  Start Humalog 12 units  Before each meal ADD SSI 2:50 >150.  Continue Metformin. Increase to 1000 mg twice daily. egfr >60  Continue Victoza 1.8 daily.   Check blood sugar four times daily. Plan to apply for CGM at next visit.  Due for eye exam.  Urine microalbumin wnls. On Ace.  Discussed proper foot care.  F/u with podiatry  +ve neuropathy on exam  Counseled pt on low carb/low fat diet and to increase physical activity.  On statin    
BMI 37. Encouraged healthy low fat low carb diet and increase physical activity    
BP controlled. Continue current meds. Pt to notify PCP if BP consistently more than 140/90.    
Encouraged healthy low fat low carb diet and increase physical activity. AST elevated and trending down.  Consider addition of actos.  F/u with PCP.    
No

## 2022-09-07 NOTE — ED ADULT NURSE NOTE - ED STAT RN HANDOFF DETAILS
pt aox4, [place] speaking; all due meds and care rendered. pt stable at this time. handoff given to cuauhtemoc mendes informed of elevated temp-ordered tylenol d/t previous collection all blood work, tylenol administered

## 2022-09-07 NOTE — H&P ADULT - PROBLEM SELECTOR PLAN 6
Hx of HTN  home meds - amlodipine, losartan, lopressor  BP monitoring  continue amlodipine, lopressor  hold losartan in setting of CALLY

## 2022-09-07 NOTE — ED PROVIDER NOTE - ENMT, MLM
Airway patent, Nasal mucosa clear. Mouth with sl dry mucosa. Throat has no vesicles, no oropharyngeal exudates and uvula is midline. Airway patent, Nasal mucosa clear. Mouth with sl dry mucosa. Throat has no vesicles, no oropharyngeal exudates and uvula is midline.  Ecchymosis-Lt nasal bridge, tenderness to palp

## 2022-09-07 NOTE — H&P ADULT - HISTORY OF PRESENT ILLNESS
Patient is a 76 y/o F from home w/ PMHx of HTN, DM, HLD, Hypothyroidism, s/p cholecystectomy, s/p hysterectomy. She had been having generalized weakness, dizziness and dysuria for the past 3 days. In the morning of admission, she was in the bathroom for an episode of non-foul smelling watery diarrhea with associated lower abdominal pain. She was having another episode of dizziness causing her to fall and hit her face. She developed abrasions on the face near the bridge of the nose. She also had an episode of nonbloody, non-bilous vomiting. She denies any headache, blurring of vision, chest pain, shortness of breath, palpitation. In the ED, her VS were stable. She was noted to have an elevated WBC of 21.09, Lactate of 3.2, Cr of 2.09, low Na of 130, K of 3.2, Mg of 1.0. CT Head was negative. CT Maxillofacial sinus is negative showing slight L nasal displacement. She was given 3L bolus of NS and a dose of Rocephin and Azithromycin. Of note, she is also taking multiple antibiotics including Bactrim and Levaquin for UTI, Metronidazole for diarrhea and Fluconazole for urinary candidiasis.    GOC: FULL CODE

## 2022-09-07 NOTE — H&P ADULT - PROBLEM SELECTOR PLAN 2
Hx of dizziness, syncope and fall  EKG - NSR  could be in setting of dehydration, poor oral intake, vomiting and diarrhea  low suspicion for cardiac cause  s/p 3L NS bolus  start IVF NS at 75 cc/hr  f/u orthostatic VS Hx of dizziness, syncope and fall  EKG - NSR  could be in setting of dehydration, poor oral intake, vomiting and diarrhea  low suspicion for cardiac cause  s/p 3L NS bolus  start IVF NS at 75 cc/hr  f/u orthostatic VS  f/u Echo  Cardio (Dr. Lora) consulted

## 2022-09-07 NOTE — H&P ADULT - NSHPREVIEWOFSYSTEMS_GEN_ALL_CORE
- CONSTITUTIONAL: Denies fever and chills  - HEENT: Denies changes in vision and hearing.  - RESPIRATORY: Denies SOB and cough.  - CV: Denies chest pain and palpitations  - GI: (+) abdominal pain, (+) nausea, (+) vomiting (+) diarrhea.  - : (+) dysuria. Denies urinary frequency.  - SKIN: Denies rash and pruritus.  - NEUROLOGICAL: (+) dizziness, (+) syncope. Denies headache and syncope.  - PSYCHIATRIC: Denies recent changes in mood. Denies anxiety and depression.

## 2022-09-07 NOTE — H&P ADULT - PROBLEM SELECTOR PLAN 4
p/w low Na (130), K (3.2). Mg (1.0)  could be in the setting of poor oral intake, vomiting and diarrhea  also has underlying CALLY  EKG - NSR, normal QTc (435)  replaced electrolytes accordingly  monitor BMP, Mg p/w low Na (130), K (3.2). Mg (1.0)  could be in the setting of poor oral intake, vomiting and diarrhea  also has underlying CALLY  EKG - NSR, normal QTc (435)  replaced electrolytes accordingly  Admit to telemetry  monitor KEESHA Mg

## 2022-09-07 NOTE — H&P ADULT - PROBLEM SELECTOR PLAN 5
Hx of syncope and fall  CTH - neg  CT Maxillofacial sinus - slight L nasal deviation  conservative management

## 2022-09-07 NOTE — ED ADULT TRIAGE NOTE - CHIEF COMPLAINT QUOTE
Fall this morning r/t weakness and dizziness since sunday. P/w bruising to nose and eyes. Denies LOC, or blood thinners.

## 2022-09-07 NOTE — ED PROVIDER NOTE - OBJECTIVE STATEMENT
77 y.o. female with h/o NIDDM, last dose yest., as per son, for past 48 hours, pt c/o feeling dizzy, vertigo with head movement, n/vx1, weakness, ataxia.  Pt went ot bathroom this am, felt weak, fell forward, hit face on the tub, no LOC, pt picked herself up.  Pt with recent ear infection, cold.  Pt's currently being treated for UTI with Bactrim.  Pt denies other injuries.  Pt's vaccinated for COVID with booster

## 2022-09-07 NOTE — ED PROVIDER NOTE - CLINICAL SUMMARY MEDICAL DECISION MAKING FREE TEXT BOX
pt with hypotension, vertigo, concern for infectious process, will get labs., give IVF, meds., admission

## 2022-09-08 ENCOUNTER — TRANSCRIPTION ENCOUNTER (OUTPATIENT)
Age: 77
End: 2022-09-08

## 2022-09-08 DIAGNOSIS — R93.89 ABNORMAL FINDINGS ON DIAGNOSTIC IMAGING OF OTHER SPECIFIED BODY STRUCTURES: ICD-10-CM

## 2022-09-08 DIAGNOSIS — N12 TUBULO-INTERSTITIAL NEPHRITIS, NOT SPECIFIED AS ACUTE OR CHRONIC: ICD-10-CM

## 2022-09-08 DIAGNOSIS — R19.7 DIARRHEA, UNSPECIFIED: ICD-10-CM

## 2022-09-08 LAB
ALBUMIN SERPL ELPH-MCNC: 2.3 G/DL — LOW (ref 3.5–5)
ALP SERPL-CCNC: 55 U/L — SIGNIFICANT CHANGE UP (ref 40–120)
ALT FLD-CCNC: 15 U/L DA — SIGNIFICANT CHANGE UP (ref 10–60)
ANION GAP SERPL CALC-SCNC: 13 MMOL/L — SIGNIFICANT CHANGE UP (ref 5–17)
AST SERPL-CCNC: 17 U/L — SIGNIFICANT CHANGE UP (ref 10–40)
BASOPHILS # BLD AUTO: 0.03 K/UL — SIGNIFICANT CHANGE UP (ref 0–0.2)
BASOPHILS NFR BLD AUTO: 0.2 % — SIGNIFICANT CHANGE UP (ref 0–2)
BILIRUB SERPL-MCNC: 0.4 MG/DL — SIGNIFICANT CHANGE UP (ref 0.2–1.2)
BUN SERPL-MCNC: 17 MG/DL — SIGNIFICANT CHANGE UP (ref 7–18)
CALCIUM SERPL-MCNC: 7.4 MG/DL — LOW (ref 8.4–10.5)
CHLORIDE SERPL-SCNC: 105 MMOL/L — SIGNIFICANT CHANGE UP (ref 96–108)
CO2 SERPL-SCNC: 18 MMOL/L — LOW (ref 22–31)
CREAT SERPL-MCNC: 1.31 MG/DL — HIGH (ref 0.5–1.3)
EGFR: 42 ML/MIN/1.73M2 — LOW
EOSINOPHIL # BLD AUTO: 0 K/UL — SIGNIFICANT CHANGE UP (ref 0–0.5)
EOSINOPHIL NFR BLD AUTO: 0 % — SIGNIFICANT CHANGE UP (ref 0–6)
GLUCOSE BLDC GLUCOMTR-MCNC: 190 MG/DL — HIGH (ref 70–99)
GLUCOSE BLDC GLUCOMTR-MCNC: 223 MG/DL — HIGH (ref 70–99)
GLUCOSE BLDC GLUCOMTR-MCNC: 235 MG/DL — HIGH (ref 70–99)
GLUCOSE SERPL-MCNC: 230 MG/DL — HIGH (ref 70–99)
HCT VFR BLD CALC: 30.9 % — LOW (ref 34.5–45)
HGB BLD-MCNC: 11 G/DL — LOW (ref 11.5–15.5)
IMM GRANULOCYTES NFR BLD AUTO: 1 % — SIGNIFICANT CHANGE UP (ref 0–1.5)
LACTATE SERPL-SCNC: 1.7 MMOL/L — SIGNIFICANT CHANGE UP (ref 0.7–2)
LYMPHOCYTES # BLD AUTO: 0.69 K/UL — LOW (ref 1–3.3)
LYMPHOCYTES # BLD AUTO: 4.1 % — LOW (ref 13–44)
MAGNESIUM SERPL-MCNC: 1.4 MG/DL — LOW (ref 1.6–2.6)
MAGNESIUM SERPL-MCNC: 1.4 MG/DL — LOW (ref 1.6–2.6)
MCHC RBC-ENTMCNC: 30.5 PG — SIGNIFICANT CHANGE UP (ref 27–34)
MCHC RBC-ENTMCNC: 35.6 GM/DL — SIGNIFICANT CHANGE UP (ref 32–36)
MCV RBC AUTO: 85.6 FL — SIGNIFICANT CHANGE UP (ref 80–100)
MONOCYTES # BLD AUTO: 1.06 K/UL — HIGH (ref 0–0.9)
MONOCYTES NFR BLD AUTO: 6.4 % — SIGNIFICANT CHANGE UP (ref 2–14)
NEUTROPHILS # BLD AUTO: 14.71 K/UL — HIGH (ref 1.8–7.4)
NEUTROPHILS NFR BLD AUTO: 88.3 % — HIGH (ref 43–77)
NRBC # BLD: 0 /100 WBCS — SIGNIFICANT CHANGE UP (ref 0–0)
PHOSPHATE SERPL-MCNC: 1.4 MG/DL — LOW (ref 2.5–4.5)
PLATELET # BLD AUTO: 269 K/UL — SIGNIFICANT CHANGE UP (ref 150–400)
POTASSIUM SERPL-MCNC: 3.3 MMOL/L — LOW (ref 3.5–5.3)
POTASSIUM SERPL-SCNC: 3.3 MMOL/L — LOW (ref 3.5–5.3)
PROT SERPL-MCNC: 5.8 G/DL — LOW (ref 6–8.3)
RBC # BLD: 3.61 M/UL — LOW (ref 3.8–5.2)
RBC # FLD: 13.3 % — SIGNIFICANT CHANGE UP (ref 10.3–14.5)
SODIUM SERPL-SCNC: 136 MMOL/L — SIGNIFICANT CHANGE UP (ref 135–145)
WBC # BLD: 16.66 K/UL — HIGH (ref 3.8–10.5)
WBC # FLD AUTO: 16.66 K/UL — HIGH (ref 3.8–10.5)

## 2022-09-08 PROCEDURE — 93970 EXTREMITY STUDY: CPT | Mod: 26

## 2022-09-08 PROCEDURE — 74176 CT ABD & PELVIS W/O CONTRAST: CPT | Mod: 26

## 2022-09-08 RX ORDER — DEXTROSE 50 % IN WATER 50 %
15 SYRINGE (ML) INTRAVENOUS ONCE
Refills: 0 | Status: DISCONTINUED | OUTPATIENT
Start: 2022-09-08 | End: 2022-09-15

## 2022-09-08 RX ORDER — INSULIN LISPRO 100/ML
2 VIAL (ML) SUBCUTANEOUS ONCE
Refills: 0 | Status: COMPLETED | OUTPATIENT
Start: 2022-09-08 | End: 2022-09-08

## 2022-09-08 RX ORDER — DEXTROSE 50 % IN WATER 50 %
25 SYRINGE (ML) INTRAVENOUS ONCE
Refills: 0 | Status: DISCONTINUED | OUTPATIENT
Start: 2022-09-08 | End: 2022-09-15

## 2022-09-08 RX ORDER — POTASSIUM PHOSPHATE, MONOBASIC POTASSIUM PHOSPHATE, DIBASIC 236; 224 MG/ML; MG/ML
30 INJECTION, SOLUTION INTRAVENOUS ONCE
Refills: 0 | Status: COMPLETED | OUTPATIENT
Start: 2022-09-08 | End: 2022-09-08

## 2022-09-08 RX ORDER — METRONIDAZOLE 500 MG
500 TABLET ORAL ONCE
Refills: 0 | Status: COMPLETED | OUTPATIENT
Start: 2022-09-08 | End: 2022-09-08

## 2022-09-08 RX ORDER — METRONIDAZOLE 500 MG
500 TABLET ORAL EVERY 8 HOURS
Refills: 0 | Status: DISCONTINUED | OUTPATIENT
Start: 2022-09-08 | End: 2022-09-09

## 2022-09-08 RX ORDER — ACETAMINOPHEN 500 MG
650 TABLET ORAL EVERY 6 HOURS
Refills: 0 | Status: DISCONTINUED | OUTPATIENT
Start: 2022-09-08 | End: 2022-09-15

## 2022-09-08 RX ORDER — PANTOPRAZOLE SODIUM 20 MG/1
40 TABLET, DELAYED RELEASE ORAL
Refills: 0 | Status: DISCONTINUED | OUTPATIENT
Start: 2022-09-08 | End: 2022-09-15

## 2022-09-08 RX ORDER — LACTOBACILLUS ACIDOPHILUS 100MM CELL
1 CAPSULE ORAL THREE TIMES A DAY
Refills: 0 | Status: DISCONTINUED | OUTPATIENT
Start: 2022-09-08 | End: 2022-09-15

## 2022-09-08 RX ORDER — MAGNESIUM SULFATE 500 MG/ML
2 VIAL (ML) INJECTION ONCE
Refills: 0 | Status: COMPLETED | OUTPATIENT
Start: 2022-09-08 | End: 2022-09-08

## 2022-09-08 RX ORDER — ACETAMINOPHEN 500 MG
650 TABLET ORAL ONCE
Refills: 0 | Status: COMPLETED | OUTPATIENT
Start: 2022-09-08 | End: 2022-09-08

## 2022-09-08 RX ORDER — METRONIDAZOLE 500 MG
TABLET ORAL
Refills: 0 | Status: DISCONTINUED | OUTPATIENT
Start: 2022-09-08 | End: 2022-09-09

## 2022-09-08 RX ORDER — CEFTRIAXONE 500 MG/1
2000 INJECTION, POWDER, FOR SOLUTION INTRAMUSCULAR; INTRAVENOUS EVERY 24 HOURS
Refills: 0 | Status: DISCONTINUED | OUTPATIENT
Start: 2022-09-08 | End: 2022-09-09

## 2022-09-08 RX ORDER — SODIUM CHLORIDE 9 MG/ML
1000 INJECTION, SOLUTION INTRAVENOUS
Refills: 0 | Status: DISCONTINUED | OUTPATIENT
Start: 2022-09-08 | End: 2022-09-15

## 2022-09-08 RX ORDER — DEXTROSE 50 % IN WATER 50 %
12.5 SYRINGE (ML) INTRAVENOUS ONCE
Refills: 0 | Status: DISCONTINUED | OUTPATIENT
Start: 2022-09-08 | End: 2022-09-15

## 2022-09-08 RX ORDER — INSULIN LISPRO 100/ML
VIAL (ML) SUBCUTANEOUS AT BEDTIME
Refills: 0 | Status: DISCONTINUED | OUTPATIENT
Start: 2022-09-08 | End: 2022-09-15

## 2022-09-08 RX ORDER — INSULIN LISPRO 100/ML
VIAL (ML) SUBCUTANEOUS
Refills: 0 | Status: DISCONTINUED | OUTPATIENT
Start: 2022-09-08 | End: 2022-09-15

## 2022-09-08 RX ORDER — GLUCAGON INJECTION, SOLUTION 0.5 MG/.1ML
1 INJECTION, SOLUTION SUBCUTANEOUS ONCE
Refills: 0 | Status: DISCONTINUED | OUTPATIENT
Start: 2022-09-08 | End: 2022-09-15

## 2022-09-08 RX ADMIN — Medication 650 MILLIGRAM(S): at 06:48

## 2022-09-08 RX ADMIN — Medication 2 UNIT(S): at 14:13

## 2022-09-08 RX ADMIN — PANTOPRAZOLE SODIUM 40 MILLIGRAM(S): 20 TABLET, DELAYED RELEASE ORAL at 09:46

## 2022-09-08 RX ADMIN — SODIUM CHLORIDE 75 MILLILITER(S): 9 INJECTION INTRAMUSCULAR; INTRAVENOUS; SUBCUTANEOUS at 10:27

## 2022-09-08 RX ADMIN — HEPARIN SODIUM 5000 UNIT(S): 5000 INJECTION INTRAVENOUS; SUBCUTANEOUS at 14:20

## 2022-09-08 RX ADMIN — Medication 2: at 12:26

## 2022-09-08 RX ADMIN — Medication 100 MILLIGRAM(S): at 17:57

## 2022-09-08 RX ADMIN — Medication 100 MILLIGRAM(S): at 21:44

## 2022-09-08 RX ADMIN — POTASSIUM PHOSPHATE, MONOBASIC POTASSIUM PHOSPHATE, DIBASIC 83.33 MILLIMOLE(S): 236; 224 INJECTION, SOLUTION INTRAVENOUS at 14:20

## 2022-09-08 RX ADMIN — CEFTRIAXONE 100 MILLIGRAM(S): 500 INJECTION, POWDER, FOR SOLUTION INTRAMUSCULAR; INTRAVENOUS at 14:20

## 2022-09-08 RX ADMIN — Medication 650 MILLIGRAM(S): at 13:04

## 2022-09-08 RX ADMIN — Medication 25 GRAM(S): at 06:51

## 2022-09-08 RX ADMIN — Medication 650 MILLIGRAM(S): at 14:00

## 2022-09-08 RX ADMIN — Medication 2: at 17:58

## 2022-09-08 RX ADMIN — Medication 1 TABLET(S): at 14:20

## 2022-09-08 RX ADMIN — Medication 25 GRAM(S): at 12:29

## 2022-09-08 RX ADMIN — HEPARIN SODIUM 5000 UNIT(S): 5000 INJECTION INTRAVENOUS; SUBCUTANEOUS at 21:44

## 2022-09-08 RX ADMIN — Medication 650 MILLIGRAM(S): at 20:05

## 2022-09-08 RX ADMIN — HEPARIN SODIUM 5000 UNIT(S): 5000 INJECTION INTRAVENOUS; SUBCUTANEOUS at 05:36

## 2022-09-08 NOTE — DISCHARGE NOTE PROVIDER - NSDCCPCAREPLAN_GEN_ALL_CORE_FT
PRINCIPAL DISCHARGE DIAGNOSIS  Diagnosis: Sepsis secondary to UTI  Assessment and Plan of Treatment: you initially came into the hospital with difficulty urinating, this was due to urinary tract infection  Sepsis happens when an infection spreads and causes your body to react strongly to germs. Your body's defense system normally releases chemicals to fight off infection at the infected area. When infection spreads, chemicals are released throughout your body. The chemicals cause inflammation and clotting in small blood vessels that is difficult to control. Inflammation and clotting decrease blood flow and oxygen to your organs. This may cause your organs to stop working correctly. Sepsis is a life-threatening emergency.  if you have any pain or low grade temperature of 100.4F, you can take tylenol 650 mg every 6 hours as needed  Take all antibiotics as ordered.  Call you Health care provider upon arrival home to make a one week follow up appointment.  If you develop fever, chills, malaise, or change in mental status call your Health Care Provider or go to the Emergency Department.  Nutrition is important, eat small frequent meals to help ensure you get adequate calories.  Do not stay in bed all day!  Increase your activity daily as tolerated.        SECONDARY DISCHARGE DIAGNOSES  Diagnosis: E coli bacteremia  Assessment and Plan of Treatment: the bacteria in the urine was also found in your blood, you were seen by infectious disease Dr. Rodriguez, you will need to complete your course of antibiotics on 9/24.  you will follow up with infectious disease Dr. Dickens, see referral provided.   there will be a nurse coming to teach you how to do the infusion and someone will come to your house to draw blood work once only.    Diagnosis: Pyelonephritis  Assessment and Plan of Treatment: you had a CT scan of your abdomen and pelvis that shows pyelonephritis in both your kidneys.   **continue to take tylenol 650 mg every 6 hours as needed for fever temp greater than 100.4F or mild pain  **you are to continue to complete your course of antibiotics - Ertapenem until 9/24.   A kidney infection is also called pyelonephritis. A kidney infection needs prompt medical treatment. If not treated properly, an infection can cause lasting damage to the kidneys. Or the bacteria can spread to the bloodstream and cause a dangerous infection  A UTI is caused by bacteria that get inside your urinary tract. Your urinary tract includes your kidneys, ureters, bladder, and urethra. Urine is made in your kidneys, and it flows from the ureters to the bladder. Urine leaves the bladder through the urethra. A UTI is more common in your lower urinary tract, which includes your bladder and urethra.  What can I do to prevent a UTI?  Empty your bladder often. Urinate and empty your bladder as soon as you feel the need. Do not hold your urine for long periods of time.  Wipe from front to back after you urinate or have a bowel movement. This will help prevent germs from getting into your urinary tract through your urethra.  Drink liquids as directed. Ask how much liquid to drink each day and which liquids are best for you. You may need to drink more liquids than usual to help flush out the bacteria. Do not drink alcohol, caffeine, or citrus juices. These can irritate your bladder and increase your symptoms. Your healthcare provider may recommend cranberry juice to help prevent a UTI.  Change sanitary pads or tampons often. This will help prevent germs from getting into your urinary tract.  Wear cotton underwear and clothes that are loose. Tight pants and nylon underwear can trap moisture and cause bacteria to grow.      Diagnosis: Inguinal fluid collection  Assessment and Plan of Treatment: on your initial CT scan of your abdomen, it was concerned that you may have a right inguinal abscess.   on your repeat CT scan of your abdomen, after the antibiotics was given, it appears that the abscess is resolving.    Diagnosis: STEC (Shiga toxin-producing Escherichia coli)  Assessment and Plan of Treatment: you came into the hospital with multiple episodes of diarrhea, we tested your stool and found (Shiga toxin-producing Escherichia coli).   you finished a course of antibiotics - azithromycin while in hospital  you may eat yogurt that has probiotics to restore healthy bacteria in your gut  you can also eat fiber to help reduce the diarrhea    Diagnosis: CALLY (acute kidney injury)  Assessment and Plan of Treatment: You creatinine was elevated due to dehydration from diarrhea  Creatinine improved with IV fluids   Avoid taking (NSAIDs) - (ex: Ibuprofen, Advil, Celebrex, Naprosyn)  Avoid taking any nephrotoxic agents (can harm kidneys) - Intravenous contrast for diagnostic testing, combination cold medications.  Have all medications adjusted for your renal function by your Health Care Provider.  Blood pressure control is important.  Take all medication as prescribed.    Diagnosis: Syncope  Assessment and Plan of Treatment: it was concerned that you may have had a syncopal episode when you fell.    CT Head was negative for any findings  you had an Echocardiogram that shows normal ejection fraction and grade 1 diastolic dysfunction.  follow up with cardiologist Dr. Agrawal in 2 weeks.    Diagnosis: Nasal fracture  Assessment and Plan of Treatment: After your fall imaging done in the hospital  CT Maxillofacial sinus - slight Left nasal deviation  continue tylenol PRN for pain  follow up outpt with Dr. Varma in 7 -10 days for bone manipulation.    Diagnosis: Electrolyte imbalance  Assessment and Plan of Treatment: due to your diarrhea you had multiple electrolyte abnormalities and you were given supplements and is now back to normal.    Diagnosis: HTN (hypertension)  Assessment and Plan of Treatment: your blood pressure ranged between: (114/50 - 139/53)  continue your home meds: amlodipine, losartan, lopressor  follow up with your primary care doctor or cardiologist.  try to take your blood pressure readings twice a day, morning and night time.   Notify your doctor if you have any of the following symptoms:   Dizziness, Lightheadedness, Blurry vision, Headache, Chest pain, Shortness of breath      Diagnosis: DM (diabetes mellitus)  Assessment and Plan of Treatment: continue taking your home medication metformin  HgA1C 6.5 this admission.  Make sure you get your HgA1c checked every three months.  If you take oral diabetes medications, check your blood glucose two times a day.  It's important not to skip any meals.  Keep a log of your blood glucose results and always take it with you to your doctor appointments.  Keep a list of your current medications including injectables and over the counter medications and bring this medication list with you to all your doctor appointments.  If you have not seen your ophthalmologist this year call for appointment.  Check your feet daily for redness, sores, or openings. Do not self treat. If no improvement in two days call your primary care physician for an appointment.  Low blood sugar (hypoglycemia) is a blood sugar below 70mg/dl. Check your blood sugar if you feel signs/symptoms of hypoglycemia. If your blood sugar is below 70 take 15 grams of carbohydrates (ex 4 oz of apple juice, 3-4 glucose tablets, or 4-6 oz of regular soda) wait 15 minutes and repeat blood sugar to make sure it comes up above 70.  If your blood sugar is above 70 and you are due for a meal, have a meal.  If you are not due for a meal have a snack.  This snack helps keeps your blood sugar at a safe range.      Diagnosis: Hypothyroidism  Assessment and Plan of Treatment: continue taking your home medication synthroid

## 2022-09-08 NOTE — PROGRESS NOTE ADULT - PROBLEM SELECTOR PLAN 4
p/w BUN/SCr - 28/2.09   rosa pre-renal   s/p 3L NS bolus  S/p IVF NS at 75 cc/hr  monitor BMP  avoid nephrotoxic substances.

## 2022-09-08 NOTE — DISCHARGE NOTE PROVIDER - PROVIDER TOKENS
PROVIDER:[TOKEN:[[id number]],FOLLOWUP:[2 weeks]] PROVIDER:[TOKEN:[[id number]],FOLLOWUP:[2 weeks]],PROVIDER:[TOKEN:[[id number]],FOLLOWUP:[1 week]],PROVIDER:[TOKEN:[[id number]],FOLLOWUP:[2 weeks]],PROVIDER:[TOKEN:[[id number]],FOLLOWUP:[1 week]]

## 2022-09-08 NOTE — PROGRESS NOTE ADULT - ASSESSMENT
78 y/o F from home w/ PMHx of HTN, DM, HLD, Hypothyroidism, s/p cholecystectomy, s/p hysterectomy. Admitted for sepsis 2/2 UTI. started on abx, ivf. following cultures, ID Dr. Rodriguez following. cardio following for syncope, pending echo. CT a/p noted with possible inguinal cysts v. abscess, sx consulted.

## 2022-09-08 NOTE — DISCHARGE NOTE PROVIDER - CARE PROVIDER_API CALL
Piero Varma)  Plastic Surgery  [address] [facility], [address]  [place] [address]  Phone: [phone]  Fax: [phone]  Follow Up Time: 2 weeks   Piero Varma)  Plastic Surgery  [address] [facility], [address]  [place] [address]  Phone: [phone]  Fax: [phone]  Follow Up Time: 2 weeks    Jaime Dickens)  Infectious Disease; Internal Medicine  [address] [place] [address]  Phone: [phone]  Fax: [phone]  Follow Up Time: 1 week    Marylu Lora)  Internal Medicine  [address] [place] [address]  Phone: [phone]  Fax: [phone]  Follow Up Time: 2 weeks    Sarahi Gutierrez  INTERNAL MEDICINE  [address] [place] [address]  Phone: [phone]  Fax: [phone]  Follow Up Time: 1 week

## 2022-09-08 NOTE — PROGRESS NOTE ADULT - SUBJECTIVE AND OBJECTIVE BOX
Patient is a 77y old  Female who presents with a chief complaint of sepsis 2/2 UTI (08 Sep 2022 16:23)      INTERVAL HPI/OVERNIGHT EVENTS:    I&O's Summary    Vital Signs Last 24 Hrs  T(C): 36.9 (08 Sep 2022 15:41), Max: 38.9 (08 Sep 2022 06:40)  T(F): 98.4 (08 Sep 2022 15:41), Max: 102 (08 Sep 2022 06:40)  HR: 77 (08 Sep 2022 15:41) (77 - 121)  BP: 115/71 (08 Sep 2022 15:41) (115/71 - 148/82)  BP(mean): --  RR: 18 (08 Sep 2022 15:41) (18 - 18)  SpO2: 98% (08 Sep 2022 15:41) (96% - 98%)    Parameters below as of 08 Sep 2022 10:01  Patient On (Oxygen Delivery Method): room air      PAST MEDICAL & SURGICAL HISTORY:  Hypertension      Diabetes      Kidney stones      Depression      Hypothyroidism      HLD (hyperlipidemia)      S/P hysterectomy      S/P cholecystectomy    SOCIAL HISTORY  Alcohol:  Tobacco:  Illicit substance use:      FAMILY HISTORY:      LABS:                        11.0   16.66 )-----------( 269      ( 08 Sep 2022 05:30 )             30.9     09-08    136  |  105  |  17  ----------------------------<  230<H>  3.3<L>   |  18<L>  |  1.31<H>    Ca    7.4<L>      08 Sep 2022 05:30  Phos  1.4     -  Mg     1.4     -    TPro  5.8<L>  /  Alb  2.3<L>  /  TBili  0.4  /  DBili  x   /  AST  17  /  ALT  15  /  AlkPhos  55  09-08      Urinalysis Basic - ( 07 Sep 2022 19:20 )    Color: Yellow / Appearance: Slightly Turbid / S.020 / pH: x  Gluc: x / Ketone: Negative  / Bili: Negative / Urobili: Negative   Blood: x / Protein: 100 / Nitrite: Negative   Leuk Esterase: Moderate / RBC: 5-10 /HPF / WBC >50 /HPF   Sq Epi: x / Non Sq Epi: Moderate /HPF / Bacteria: Many /HPF      CAPILLARY BLOOD GLUCOSE      POCT Blood Glucose.: 235 mg/dL (08 Sep 2022 09:27)        Urinalysis Basic - ( 07 Sep 2022 19:20 )    Color: Yellow / Appearance: Slightly Turbid / S.020 / pH: x  Gluc: x / Ketone: Negative  / Bili: Negative / Urobili: Negative   Blood: x / Protein: 100 / Nitrite: Negative   Leuk Esterase: Moderate / RBC: 5-10 /HPF / WBC >50 /HPF   Sq Epi: x / Non Sq Epi: Moderate /HPF / Bacteria: Many /HPF        MEDICATIONS  (STANDING):  cefTRIAXone   IVPB 2000 milliGRAM(s) IV Intermittent every 24 hours  dextrose 5%. 1000 milliLiter(s) (100 mL/Hr) IV Continuous <Continuous>  dextrose 5%. 1000 milliLiter(s) (50 mL/Hr) IV Continuous <Continuous>  dextrose 50% Injectable 25 Gram(s) IV Push once  dextrose 50% Injectable 12.5 Gram(s) IV Push once  dextrose 50% Injectable 25 Gram(s) IV Push once  glucagon  Injectable 1 milliGRAM(s) IntraMuscular once  heparin   Injectable 5000 Unit(s) SubCutaneous every 8 hours  insulin lispro (ADMELOG) corrective regimen sliding scale   SubCutaneous three times a day before meals  insulin lispro (ADMELOG) corrective regimen sliding scale   SubCutaneous at bedtime  lactobacillus acidophilus 1 Tablet(s) Oral three times a day  metroNIDAZOLE  IVPB      metroNIDAZOLE  IVPB 500 milliGRAM(s) IV Intermittent once  metroNIDAZOLE  IVPB 500 milliGRAM(s) IV Intermittent every 8 hours  pantoprazole    Tablet 40 milliGRAM(s) Oral before breakfast    MEDICATIONS  (PRN):  acetaminophen     Tablet .. 650 milliGRAM(s) Oral every 6 hours PRN Temp greater or equal to 38C (100.4F), Mild Pain (1 - 3)  dextrose Oral Gel 15 Gram(s) Oral once PRN Blood Glucose LESS THAN 70 milliGRAM(s)/deciliter  ondansetron Injectable 4 milliGRAM(s) IV Push every 8 hours PRN Nausea and/or Vomiting      REVIEW OF SYSTEMS:  CONSTITUTIONAL: no fever  EYES: No eye pain, visual disturbances,  ENMT:  No difficulty hearing,; No sinus or throat pain  NECK: No pain   RESPIRATORY: No cough, No shortness of breath  CARDIOVASCULAR: No chest pain, palpitations. + dizziness  GASTROINTESTINAL: No abdominal pain. No nausea, vomiting. + diarrhea   GENITOURINARY: No dysuria,  NEUROLOGICAL: No headaches,   SKIN: No  rashes, or lesions   MUSCULOSKELETAL: No joint pain or swelling;    RADIOLOGY & ADDITIONAL TESTS:    < from: CT Head No Cont (22 @ 15:12) >    ACC: [id number] EXAM:  CT MAXILLOFACIAL                        ACC: [id number] EXAM:  CT BRAIN                          PROCEDURE DATE:  2022          INTERPRETATION:  CT HEAD, CT MAXILLOFACIAL    INDICATIONS: dizziness, falling, weakness. P/w bruising to nose and eyes.   Denies LOC, or blood thinners. No additional history was provided.    CT BRAIN:    TECHNIQUE:  Multiple contiguous axial images were obtained from the skull   base to the vertex without the use of intravenous contrast.    COMPARISON EXAMINATION: None available at this time.    FINDINGS:  Ventricles and sulci: Parenchymal volume loss is present which is   commensurate with patient age.  Intra-axial: There are hemispheric white matter areas of low attenuation   which are nonspecific but likely related to sequelae of microvascular   disease.  No intracranial mass, acute hemorrhage, or significant midline shift is   present.  Extra-axial: There is no extra-axial collection.  Visualized sinuses: No air-fluid levels are identified. Mild mucosal   thickening in the ethmoid air cells.  Visualized mastoids:  Clear.  Calvarium: Unremarkable.  Miscellaneous:  None.    Impression: See below    ===========================================================================  ===========    MAXILLOFACIAL CT:    TECHNIQUE: This examination consists of axial 1.25 mm sections from the   frontal sinuses through the mandible.  The study was supplemented with   coronal and sagittal reformatted images.  Dedicated 3-D images were made   using dedicated software and viewed on a dedicated 3-D workstation in   multiple planes.    Intravenous contrast was not administered.    FINDINGS:    Nasal bone fractures, slightly displaced on the left side.    The visualized sinuses demonstrate no evidence of opacification or   mucosal thickening.    The nasopharynx is normal in appearance.    The parotid glands are normal and symmetric in appearance.    The  space is normal bilaterally.    Parapharyngeal space is normal bilaterally.    The tongue base is normal in appearance.    The epiglottis is normal in thickness and appearance.    Hunter tonsils are normal in appearance.    The submandibular glands are normal and symmetric in appearance   bilaterally.    The platysma is normal in thickness.    Sternocleidomastoid muscles are normal and symmetric in appearance   bilaterally where visualized.    Anterior strap muscles are unremarkable where visualized.    Carotid space is normal bilaterally where visualized.    No suspicious adenopathy.      IMPRESSIONS:    Head CT: No CT evidence of acute intracranial hemorrhage.    Maxillofacial CT: Nasal bone fractures, slightly displaced on the left   side.    --- End of Report ---            JASON FINNEY MD; Attending Radiologist  This document has been electronically signed. Sep  7 2022  3:30PM    < end of copied text >      ACC: [id number] EXAM:  XR CHEST PORTABLE URGENT 1V                          PROCEDURE DATE:  2022          INTERPRETATION:  AP semierect chest on 2022 at 4:15 PM.   Patient has chest pain.    Heart size is within normal limits. Slight right upper thoracic curve and   clips in the right upper quadrant again noted.    Lungs are clear.    Findings are similar to CAT scan 2019.    IMPRESSION: No acute finding or change.    --- End of Report ---    ACC: [id number] EXAM:  CT ABDOMEN AND PELVIS                          PROCEDURE DATE:  2022          INTERPRETATION:  CLINICAL INFORMATION: 77 years  Female with sepsis.   History of hypertension, diabetes, hyperlipidemia, hypothyroidism,   hysterectomy and cholecystectomy. Status post laparoscopic appendectomy   2019    COMPARISON: 2019    CONTRAST/COMPLICATIONS:  IV Contrast: None  Oral Contrast: None  Complications: None    PROCEDURE:  CT of the Abdomen and Pelvis was performed.  Sagittal and coronal reformats were performed.    LIMITATIONS: Evaluation of the solid organs, vascular structures and GI   tract is limited without oral and IV contrast. Motion artifact.    FINDINGS:  LOWER CHEST: Trace bilateral pleural effusionswith underlying passive   atelectasis, greater on the left. Trace pericardial effusion.    LIVER: Within normal limits.  BILE DUCTS: Normal caliber.  GALLBLADDER: Cholecystectomy.  SPLEEN: Within normal limits.  PANCREAS: Within normal limits.  ADRENALS: Within normal limits.  KIDNEYS/URETERS: Extensive bilateral perinephric fat stranding and fluid,   slightly greater on the left, suggestive of pyelonephritis. No   hydroureteronephrosis or calculi.    BLADDER: Within normal limits.  REPRODUCTIVE ORGANS: Hysterectomy.    BOWEL: No bowel obstruction. Appendix is surgically absent.  PERITONEUM: Small perihepatic, left paracolic gutter and pelvic ascites.  VESSELS: Atherosclerotic changes.  RETROPERITONEUM/LYMPH NODES: No lymphadenopathy.  ABDOMINAL WALL: 2.1 cm right inguinal cystic structure. Right inguinal   and proximal thigh edema. Small fat-containing umbilical hernia.   Injection site in the right abdominal wall.  BONES: Mild degenerative changes. Grade 1 L4-5 anterolisthesis.    IMPRESSION:  Evaluation of the solid organs, vascular structures and GI tract is   limited without oral and IV contrast.    Perinephric edema suggestive of bilateral pyelonephritis. Small ascites   and small bilateral pleural effusions.    Right inguinal cystic structure with surrounding edema may represent   abscess or infected plug from prior hernia repair. If DVT is suspected,   recommend ultrasound Doppler evaluation.    Findings were discussed with LUIS DANIEL DODGE [id number] 2022 10:57   AM by Dr. Arabella Colin with read back confirmation.    --- End of Report ---            ARABELLA COLIN MD; Attending Radiologist  This document has been electronically signed. Sep  8 2022 10:58AM    ACC: [id number] EXAM:  US DPLX LWR EXT VEINS COMPL BI                          PROCEDURE DATE:  2022          INTERPRETATION:  CLINICAL INFORMATION: Sepsis    COMPARISON: None available.    TECHNIQUE: Duplex sonography of the BILATERAL LOWER extremity veins with   color and spectral Doppler, with and without compression.    FINDINGS:    RIGHT:  Normal compressibility of the RIGHT common femoral, femoral and popliteal   veins.  Doppler examination shows normal spontaneous and phasic flow.  No RIGHT calf vein thrombosis is detected.    LEFT:  Normal compressibility of the LEFT common femoral, femoral and popliteal   veins.  Doppler examination shows normal spontaneous and phasic flow.  No LEFT calf vein thrombosis is detected.    IMPRESSION:  No evidence of deep venous thrombosis in either lower extremity.          --- End of Report ---        JENNIFER DAVIS MD; Attending Radiologist  This document has been electronically signed. Sep  8 2022 12:27PM    Imaging Personally Reviewed:  [ x] YES  [ ] NO    Consultant(s) Notes Reviewed:  [x ] YES  [ ] NO    PHYSICAL EXAM:  GENERAL: NAD  HEAD:  Atraumatic, Normocephalic  EYES:  conjunctiva and sclera clear  ENMT: Moist mucous membranes  NECK: Supple,  NERVOUS SYSTEM:  Alert & Oriented X3, Good concentration; ( Vietnamese - son at bedside)  CHEST/LUNG: CTA bilaterally  HEART: Regular rate and rhythm  ABDOMEN: Soft, Nontender, Nondistended; Bowel sounds present  EXTREMITIES:  2+ Peripheral Pulses,  SKIN: No rashes or lesions    Care Collaborated Discussed with Consultants/Other Providers [ x] YES  [ ] NO

## 2022-09-08 NOTE — CONSULT NOTE ADULT - ADDITIONAL PE
rt groin area, palpable mass, mobile, TTP, no fluctuance or no erythema noted, skin integrity intact

## 2022-09-08 NOTE — CONSULT NOTE ADULT - SUBJECTIVE AND OBJECTIVE BOX
CHIEF COMPLAINT:Patient is a 77y old  Female who presents with a chief complaint of sepsis 2/2 UTI.      HPI:  Patient is a 78 y/o F from home w/ PMHx of HTN, DM, HLD, Hypothyroidism, s/p cholecystectomy, s/p hysterectomy. She had been having generalized weakness, dizziness and dysuria for the past 3 days. In the morning of admission, she was in the bathroom for an episode of non-foul smelling watery diarrhea with associated lower abdominal pain. She was having another episode of dizziness causing her to fall and hit her face. She developed abrasions on the face near the bridge of the nose. She also had an episode of nonbloody, non-bilous vomiting. She denies any headache, blurring of vision, chest pain, shortness of breath, palpitation. In the ED, her VS were stable. She was noted to have an elevated WBC of 21.09, Lactate of 3.2, Cr of 2.09, low Na of 130, K of 3.2, Mg of 1.0. CT Head was negative. CT Maxillofacial sinus is negative showing slight L nasal displacement. She was given 3L bolus of NS and a dose of Rocephin and Azithromycin. Of note, she is also taking multiple antibiotics including Bactrim and Levaquin for UTI, Metronidazole for diarrhea and Fluconazole for urinary candidiasis.    [facility]: FULL CODE (07 Sep 2022 19:05)      PAST MEDICAL & SURGICAL HISTORY:  Hypertension      Diabetes      Kidney stones      Depression      Hypothyroidism      HLD (hyperlipidemia)      S/P hysterectomy      S/P cholecystectomy          MEDICATIONS  (STANDING):  cefTRIAXone   IVPB 2000 milliGRAM(s) IV Intermittent every 24 hours  dextrose 5%. 1000 milliLiter(s) (50 mL/Hr) IV Continuous <Continuous>  dextrose 5%. 1000 milliLiter(s) (100 mL/Hr) IV Continuous <Continuous>  dextrose 50% Injectable 25 Gram(s) IV Push once  dextrose 50% Injectable 12.5 Gram(s) IV Push once  dextrose 50% Injectable 25 Gram(s) IV Push once  glucagon  Injectable 1 milliGRAM(s) IntraMuscular once  heparin   Injectable 5000 Unit(s) SubCutaneous every 8 hours  insulin lispro (ADMELOG) corrective regimen sliding scale   SubCutaneous three times a day before meals  insulin lispro (ADMELOG) corrective regimen sliding scale   SubCutaneous at bedtime  lactobacillus acidophilus 1 Tablet(s) Oral three times a day  pantoprazole    Tablet 40 milliGRAM(s) Oral before breakfast  potassium phosphate IVPB 30 milliMole(s) IV Intermittent once    MEDICATIONS  (PRN):  acetaminophen     Tablet .. 650 milliGRAM(s) Oral every 6 hours PRN Temp greater or equal to 38C (100.4F), Mild Pain (1 - 3)  dextrose Oral Gel 15 Gram(s) Oral once PRN Blood Glucose LESS THAN 70 milliGRAM(s)/deciliter  ondansetron Injectable 4 milliGRAM(s) IV Push every 8 hours PRN Nausea and/or Vomiting      FAMILY HISTORY:      SOCIAL HISTORY:    [x ] ex-smoker    [ x] Alcohol-denies    Allergies    No Known Allergies    Intolerances    	    REVIEW OF SYSTEMS:  CONSTITUTIONAL: No fever, weight loss, or fatigue  EYES: No eye pain, visual disturbances, or discharge  ENT:  No difficulty hearing, tinnitus, vertigo; No sinus or throat pain  NECK: No pain or stiffness  RESPIRATORY: No cough, wheezing, chills or hemoptysis; No Shortness of Breath  CARDIOVASCULAR: No chest pain, palpitations, passing out, dizziness, or leg swelling  GASTROINTESTINAL: No abdominal or epigastric pain. No nausea, vomiting, or hematemesis; + diarrhea. No melena or hematochezia.  GENITOURINARY: No dysuria, frequency, hematuria, or incontinence  NEUROLOGICAL: No headaches, memory loss, loss of strength, numbness, or tremors  SKIN: No itching, burning, rashes, or lesions   LYMPH Nodes: No enlarged glands  ENDOCRINE: No heat or cold intolerance; No hair loss  MUSCULOSKELETAL: No joint pain or swelling; No muscle, back, or extremity pain  PSYCHIATRIC: No depression, anxiety, mood swings, or difficulty sleeping  HEME/LYMPH: No easy bruising, or bleeding gums  ALLERGY AND IMMUNOLOGIC: No hives or eczema	      PHYSICAL EXAM:  T(C): 36.9 (09-08-22 @ 10:01), Max: 38.9 (09-08-22 @ 06:40)  HR: 95 (09-08-22 @ 10:01) (91 - 121)  BP: 123/78 (09-08-22 @ 10:01) (97/53 - 148/82)  RR: 18 (09-08-22 @ 10:01) (18 - 18)  SpO2: 97% (09-08-22 @ 10:01) (96% - 99%)  Wt(kg): --  I&O's Summary      Appearance: Normal	  HEENT:   Normal oral mucosa, PERRL, EOMI	  Lymphatic: No lymphadenopathy  Cardiovascular: Normal S1 S2, No JVD, No murmurs, No edema  Respiratory: Lungs clear to auscultation	  Psychiatry: A & O x 3, Mood & affect appropriate  Gastrointestinal:  Soft, Non-tender, + BS	  Skin: No rashes, No ecchymoses, No cyanosis	  Neurologic: Non-focal  Extremities: Normal range of motion, No clubbing, cyanosis or edema  Vascular: Peripheral pulses palpable 2+ bilaterally    	    ECG:  Normal sinus rhythm  Minimal voltage criteria for LVH, may be normal variant  Nonspecific T wave abnormality        	  LABS:	 	    CARDIAC MARKERS ( 07 Sep 2022 16:10 )  x     / x     / 149 U/L / x     / x          Troponin I, High Sensitivity Result: 12.2 ng/L (09-07-22 @ 16:10)                          11.0   16.66 )-----------( 269      ( 08 Sep 2022 05:30 )             30.9     09-08    136  |  105  |  17  ----------------------------<  230<H>  3.3<L>   |  18<L>  |  1.31<H>    Ca    7.4<L>      08 Sep 2022 05:30  Phos  1.4     09-08  Mg     1.4     09-08    TPro  5.8<L>  /  Alb  2.3<L>  /  TBili  0.4  /  DBili  x   /  AST  17  /  ALT  15  /  AlkPhos  55  09-08    proBNP: Serum Pro-Brain Natriuretic Peptide: 903 pg/mL (09-07 @ 16:10)      < from: US Duplex Venous Lower Ext Complete, Bilateral (09.08.22 @ 11:57) >  ACC: [id number] EXAM:  US DPLX LWR EXT VEINS COMPL BI                          PROCEDURE DATE:  09/08/2022          INTERPRETATION:  CLINICAL INFORMATION: Sepsis    COMPARISON: None available.    TECHNIQUE: Duplex sonography of the BILATERAL LOWER extremity veins with   color and spectral Doppler, with and without compression.    FINDINGS:    RIGHT:  Normal compressibility of the RIGHT common femoral, femoral and popliteal   veins.  Doppler examination shows normal spontaneous and phasic flow.  No RIGHT calf vein thrombosis is detected.    LEFT:  Normal compressibility of the LEFT common femoral, femoral and popliteal   veins.  Doppler examination shows normal spontaneous and phasic flow.  No LEFT calf vein thrombosis is detected.    IMPRESSION:  No evidence of deep venous thrombosis in either lower extremity.          --- End of Report ---            JENNIFER DAVIS MD; Attending Radiologist  This document has been electronically signed. Sep  8 2022 12:27PM    < end of copied text >  < from: CT Abdomen and Pelvis No Cont (09.08.22 @ 09:14) >    ACC: [id number] EXAM:  CT ABDOMEN AND PELVIS                          PROCEDURE DATE:  09/08/2022          INTERPRETATION:  CLINICAL INFORMATION: 77 years  Female with sepsis.   History of hypertension, diabetes, hyperlipidemia, hypothyroidism,   hysterectomy and cholecystectomy. Status post laparoscopic appendectomy   6/12/2019    COMPARISON: 6/12/2019    CONTRAST/COMPLICATIONS:  IV Contrast: None  Oral Contrast: None  Complications: None    PROCEDURE:  CT of the Abdomen and Pelvis was performed.  Sagittal and coronal reformats were performed.    LIMITATIONS: Evaluation of the solid organs, vascular structures and GI   tract is limited without oral and IV contrast. Motion artifact.    FINDINGS:  LOWER CHEST: Trace bilateral pleural effusionswith underlying passive   atelectasis, greater on the left. Trace pericardial effusion.    LIVER: Within normal limits.  BILE DUCTS: Normal caliber.  GALLBLADDER: Cholecystectomy.  SPLEEN: Within normal limits.  PANCREAS: Within normal limits.  ADRENALS: Within normal limits.  KIDNEYS/URETERS: Extensive bilateral perinephric fat stranding and fluid,   slightly greater on the left, suggestive of pyelonephritis. No   hydroureteronephrosis or calculi.    BLADDER: Within normal limits.  REPRODUCTIVE ORGANS: Hysterectomy.    BOWEL: No bowel obstruction. Appendix is surgically absent.  PERITONEUM: Small perihepatic, left paracolic gutter and pelvic ascites.  VESSELS: Atherosclerotic changes.  RETROPERITONEUM/LYMPH NODES: No lymphadenopathy.  ABDOMINAL WALL: 2.1 cm right inguinal cystic structure. Right inguinal   and proximal thigh edema. Small fat-containing umbilical hernia.   Injection site in the right abdominal wall.  BONES: Mild degenerative changes. Grade 1 L4-5 anterolisthesis.    IMPRESSION:  Evaluation of the solid organs, vascular structures and GI tract is   limited without oral and IV contrast.    Perinephric edema suggestive of bilateral pyelonephritis. Small ascites   and small bilateral pleural effusions.    Right inguinal cystic structure with surrounding edema may represent   abscess or infected plug from prior hernia repair. If DVT is suspected,   recommend ultrasound Doppler evaluation.    Findings were discussed with LUIS DANIEL DODGE [id number] 9/8/2022 10:57   AM by Dr. Arabella Colin with read back confirmation.    --- End of Report ---            ARABELLA COLIN MD; Attending Radiologist  This document has been electronically signed. Sep  8 2022 10:58AM    < end of copied text >  < from: Xray Chest 1 View- PORTABLE-Urgent (09.07.22 @ 17:02) >  ACC: [id number] EXAM:  XR CHEST PORTABLE URGENT 1V                          PROCEDURE DATE:  09/07/2022          INTERPRETATION:  AP semierect chest on September 7, 2022 at 4:15 PM.   Patient has chest pain.    Heart size is within normal limits. Slight right upper thoracic curve and   clips in the right upper quadrant again noted.    Lungs are clear.    Findings are similar to CAT scan June 12, 2019.    IMPRESSION: No acute finding or change.    --- End of Report ---            NEHAL KHAN MD;Attending Radiologist  This document has been electronically signed. Sep  8 2022 11:50AM    < end of copied text >  < from: CT Head No Cont (09.07.22 @ 15:12) >  ACC: [id number] EXAM:  CT MAXILLOFACIAL                        ACC: [id number] EXAM:  CT BRAIN                          PROCEDURE DATE:  09/07/2022          INTERPRETATION:  CT HEAD, CT MAXILLOFACIAL    INDICATIONS: dizziness, falling, weakness. P/w bruising to nose and eyes.   Denies LOC, or blood thinners. No additional history was provided.    CT BRAIN:    TECHNIQUE:  Multiple contiguous axial images were obtained from the skull   base to the vertex without the use of intravenous contrast.    COMPARISON EXAMINATION: None available at this time.    FINDINGS:  Ventricles and sulci: Parenchymal volume loss is present which is   commensurate with patient age.  Intra-axial: There are hemispheric white matter areas of low attenuation   which are nonspecific but likely related to sequelae of microvascular   disease.  No intracranial mass, acute hemorrhage, or significant midline shift is   present.  Extra-axial: There is no extra-axial collection.  Visualized sinuses: No air-fluid levels are identified. Mild mucosal   thickening in the ethmoid air cells.  Visualized mastoids:  Clear.  Calvarium: Unremarkable.  Miscellaneous:  None.    Impression: See below    ===========================================================================  ===========    MAXILLOFACIAL CT:    TECHNIQUE: This examination consists of axial 1.25 mm sections from the   frontal sinuses through the mandible.  The study was supplemented with   coronal and sagittal reformatted images.  Dedicated 3-D images were made   using dedicated software and viewed on a dedicated 3-D workstation in   multiple planes.    Intravenous contrast was not administered.    FINDINGS:    Nasal bone fractures, slightly displaced on the left side.    The visualized sinuses demonstrate no evidence of opacification or   mucosal thickening.    The nasopharynx is normal in appearance.    The parotid glands are normal and symmetric in appearance.    The  space is normal bilaterally.    Parapharyngeal space is normal bilaterally.    The tongue base is normal in appearance.    The epiglottis is normal in thickness and appearance.    [place] tonsils are normal in appearance.    The submandibular glands are normal and symmetric in appearance   bilaterally.    The platysma is normal in thickness.    Sternocleidomastoid muscles are normal and symmetric in appearance   bilaterally where visualized.    Anterior strap muscles are unremarkable where visualized.    Carotid space is normal bilaterally where visualized.    No suspicious adenopathy.      IMPRESSIONS:    Head CT: No CT evidence of acute intracranial hemorrhage.    Maxillofacial CT: Nasal bone fractures, slightly displaced on the left   side.    --- End of Report ---            JASON FINNEY MD; Attending Radiologist  This document has been electronically signed. Sep  7 2022  3:30PM

## 2022-09-08 NOTE — CONSULT NOTE ADULT - SUBJECTIVE AND OBJECTIVE BOX
Patient is a 77y old  Female who presents with a chief complaint of sepsis 2/2 UTI (08 Sep 2022 07:47)  H&Patient is a 78 y/o F from home w/ PMHx of HTN, DM, HLD, Hypothyroidism, s/p cholecystectomy, s/p hysterectomy. She had been having generalized weakness, dizziness and dysuria for the past 3 days. In the morning of admission, she was in the bathroom for an episode of non-foul smelling watery diarrhea with associated lower abdominal pain. She was having another episode of dizziness causing her to fall and hit her face. She developed abrasions on the face near the bridge of the nose. She also had an episode of nonbloody, non-bilous vomiting. She denies any headache, blurring of vision, chest pain, shortness of breath, palpitation. In the ED, her VS were stable. She was noted to have an elevated WBC of 21.09, Lactate of 3.2, Cr of 2.09, low Na of 130, K of 3.2, Mg of 1.0. CT Head was negative. CT Maxillofacial sinus is negative showing slight L nasal displacement. She was given 3L bolus of NS and a dose of Rocephin and Azithromycin. Of note, she is also taking multiple antibiotics including Bactrim and Levaquin for UTI, Metronidazole for diarrhea and Fluconazole for urinary candidiasis.        REVIEW OF SYSTEMS: Total of twelve systems have been reviewed with patient and found to be negative unless mentioned in HPI          PAST MEDICAL & SURGICAL HISTORY:  Hypertension  Diabetes  Kidney stones  Depression  Hypothyroidism  HLD (hyperlipidemia)  S/P hysterectomy  S/P cholecystectomy        SOCIAL HISTORY  Alcohol: Does not drink  Tobacco: Does not smoke  Illicit substance use: None      FAMILY HISTORY: Non contributory to the present illness        ALLERGIES: No Known Allergies      Vital Signs Last 24 Hrs  T(C): 36.9 (08 Sep 2022 10:01), Max: 38.9 (08 Sep 2022 06:40)  T(F): 98.4 (08 Sep 2022 10:01), Max: 102 (08 Sep 2022 06:40)  HR: 95 (08 Sep 2022 10:01) (91 - 121)  BP: 123/78 (08 Sep 2022 10:01) (97/53 - 148/82)  BP(mean): --  RR: 18 (08 Sep 2022 10:01) (18 - 18)  SpO2: 97% (08 Sep 2022 10:01) (96% - 99%)    Parameters below as of 08 Sep 2022 10:01  Patient On (Oxygen Delivery Method): room air        PHYSICAL EXAM:  GENERAL: Not in distress   CHEST/LUNG:  Aire ntry bilaterally  HEART: s1 and s2 present  ABDOMEN:  Nontender and  Nondistended  EXTREMITIES: No pedal  edema  CNS: Awake and Alert      LABS:                        11.0   16.66 )-----------( 269      ( 08 Sep 2022 05:30 )             30.9       09-08    136  |  105  |  17  ----------------------------<  230<H>  3.3<L>   |  18<L>  |  1.31<H>    Ca    7.4<L>      08 Sep 2022 05:30  Phos  1.4     09-  Mg     1.4     -08    TPro  5.8<L>  /  Alb  2.3<L>  /  TBili  0.4  /  DBili  x   /  AST  17  /  ALT  15  /  AlkPhos  55  09-08        CAPILLARY BLOOD GLUCOSE  POCT Blood Glucose.: 235 mg/dL (08 Sep 2022 09:27)  POCT Blood Glucose.: 301 mg/dL (07 Sep 2022 13:56)        Urinalysis Basic - ( 07 Sep 2022 19:20 )  Color: Yellow / Appearance: Slightly Turbid / S.020 / pH: x  Gluc: x / Ketone: Negative  / Bili: Negative / Urobili: Negative   Blood: x / Protein: 100 / Nitrite: Negative   Leuk Esterase: Moderate / RBC: 5-10 /HPF / WBC >50 /HPF   Sq Epi: x / Non Sq Epi: Moderate /HPF / Bacteria: Many /HPF        MEDICATIONS  (STANDING):  cefTRIAXone   IVPB 2000 milliGRAM(s) IV Intermittent every 24 hours  dextrose 5%. 1000 milliLiter(s) (50 mL/Hr) IV Continuous <Continuous>  dextrose 5%. 1000 milliLiter(s) (100 mL/Hr) IV Continuous <Continuous>  dextrose 50% Injectable 25 Gram(s) IV Push once  dextrose 50% Injectable 12.5 Gram(s) IV Push once  dextrose 50% Injectable 25 Gram(s) IV Push once  glucagon  Injectable 1 milliGRAM(s) IntraMuscular once  heparin   Injectable 5000 Unit(s) SubCutaneous every 8 hours  insulin lispro (ADMELOG) corrective regimen sliding scale   SubCutaneous three times a day before meals  insulin lispro (ADMELOG) corrective regimen sliding scale   SubCutaneous at bedtime  lactobacillus acidophilus 1 Tablet(s) Oral three times a day  magnesium sulfate  IVPB 2 Gram(s) IV Intermittent once  pantoprazole    Tablet 40 milliGRAM(s) Oral before breakfast  potassium phosphate IVPB 30 milliMole(s) IV Intermittent once    MEDICATIONS  (PRN):  dextrose Oral Gel 15 Gram(s) Oral once PRN Blood Glucose LESS THAN 70 milliGRAM(s)/deciliter  ondansetron Injectable 4 milliGRAM(s) IV Push every 8 hours PRN Nausea and/or Vomiting          RADIOLOGY & ADDITIONAL TESTS:               Patient is a 77y old  Female from home w/ PMHx of HTN, DM, HLD, Hypothyroidism, s/p cholecystectomy, s/p hysterectomy, now presents to the ER for evaluation of generalized weakness, dizziness and dysuria for the past 3 days, watery diarrhea and associated with vomiting  and lower abdominal pain. She has dizziness causing her to fall and hit her face. On admission, she found to have fever, tachycardia, Leukocytosis and positive Urine analysis. The CT abd/pelvis shows  Perinephric edema suggestive of bilateral pyelonephritis and Right inguinal cystic structure with surrounding edema may represent  abscess or infected plug from prior hernia repair. She has started on Ceftriaxone and The ID consult requested to assist with further evaluation and antibiotic management,       REVIEW OF SYSTEMS: Total of twelve systems have been reviewed with patient and found to be negative unless mentioned in HPI      PAST MEDICAL & SURGICAL HISTORY:  Hypertension  Diabetes  Kidney stones  Depression  Hypothyroidism  HLD (hyperlipidemia)  S/P hysterectomy  S/P cholecystectomy      SOCIAL HISTORY  Alcohol: Does not drink  Tobacco: Does not smoke  Illicit substance use: None      FAMILY HISTORY: Non contributory to the present illness      ALLERGIES: No Known Allergies      Vital Signs Last 24 Hrs  T(C): 36.9 (08 Sep 2022 10:01), Max: 38.9 (08 Sep 2022 06:40)  T(F): 98.4 (08 Sep 2022 10:01), Max: 102 (08 Sep 2022 06:40)  HR: 95 (08 Sep 2022 10:01) (91 - 121)  BP: 123/78 (08 Sep 2022 10:01) (97/53 - 148/82)  BP(mean): --  RR: 18 (08 Sep 2022 10:01) (18 - 18)  SpO2: 97% (08 Sep 2022 10:01) (96% - 99%)    Parameters below as of 08 Sep 2022 10:01  Patient On (Oxygen Delivery Method): room air        PHYSICAL EXAM:  GENERAL: Not in distress   CHEST/LUNG: Not using accessory muscles   HEART: s1 and s2 present  ABDOMEN:  Nontender and  Nondistended  EXTREMITIES: No pedal  edema  CNS: Awake and Alert      LABS:                        11.0   16.66 )-----------( 269      ( 08 Sep 2022 05:30 )             30.9       09-08    136  |  105  |  17  ----------------------------<  230<H>  3.3<L>   |  18<L>  |  1.31<H>    Ca    7.4<L>      08 Sep 2022 05:30  Phos  1.4       Mg     1.4         TPro  5.8<L>  /  Alb  2.3<L>  /  TBili  0.4  /  DBili  x   /  AST  17  /  ALT  15  /  AlkPhos  55          CAPILLARY BLOOD GLUCOSE  POCT Blood Glucose.: 235 mg/dL (08 Sep 2022 09:27)  POCT Blood Glucose.: 301 mg/dL (07 Sep 2022 13:56)        Urinalysis Basic - ( 07 Sep 2022 19:20 )  Color: Yellow / Appearance: Slightly Turbid / S.020 / pH: x  Gluc: x / Ketone: Negative  / Bili: Negative / Urobili: Negative   Blood: x / Protein: 100 / Nitrite: Negative   Leuk Esterase: Moderate / RBC: 5-10 /HPF / WBC >50 /HPF   Sq Epi: x / Non Sq Epi: Moderate /HPF / Bacteria: Many /HPF        MEDICATIONS  (STANDING):  cefTRIAXone   IVPB 2000 milliGRAM(s) IV Intermittent every 24 hours  dextrose 5%. 1000 milliLiter(s) (50 mL/Hr) IV Continuous <Continuous>  dextrose 5%. 1000 milliLiter(s) (100 mL/Hr) IV Continuous <Continuous>  dextrose 50% Injectable 25 Gram(s) IV Push once  dextrose 50% Injectable 12.5 Gram(s) IV Push once  dextrose 50% Injectable 25 Gram(s) IV Push once  glucagon  Injectable 1 milliGRAM(s) IntraMuscular once  heparin   Injectable 5000 Unit(s) SubCutaneous every 8 hours  insulin lispro (ADMELOG) corrective regimen sliding scale   SubCutaneous three times a day before meals  insulin lispro (ADMELOG) corrective regimen sliding scale   SubCutaneous at bedtime  lactobacillus acidophilus 1 Tablet(s) Oral three times a day  magnesium sulfate  IVPB 2 Gram(s) IV Intermittent once  pantoprazole    Tablet 40 milliGRAM(s) Oral before breakfast  potassium phosphate IVPB 30 milliMole(s) IV Intermittent once    MEDICATIONS  (PRN):  dextrose Oral Gel 15 Gram(s) Oral once PRN Blood Glucose LESS THAN 70 milliGRAM(s)/deciliter  ondansetron Injectable 4 milliGRAM(s) IV Push every 8 hours PRN Nausea and/or Vomiting        RADIOLOGY & ADDITIONAL TESTS:    22: CT Abdomen and Pelvis No Cont (22 @ 09:14) >Evaluation of the solid organs, vascular structures and GI tract is   limited without oral and IV contrast.  Perinephric edema suggestive of bilateral pyelonephritis. Small ascites  and small bilateral pleural effusions.    Right inguinal cystic structure with surrounding edema may represent  abscess or infected plug from prior hernia repair. If DVT is suspected,   recommend ultrasound Doppler evaluation.    Findings were discussed with LUIS DANIEL DODGE [id number] 2022 10:57  AM by Dr. Brianna Roy with read back confirmation.      22 : Xray Chest 1 View- PORTABLE-Urgent (22 @ 17:02) >  IMPRESSION: No acute finding or change.      MICROBIOLOGY  DATA:    Rapid HIV-1/2 Antibody (22 @ 16:10)   Rapid HIV-1/2 Antibody: Nonreact:     COVID-19 PCR (22 @ 16:10)   COVID-19 PCR: NotDetec:

## 2022-09-08 NOTE — PROGRESS NOTE ADULT - PROBLEM SELECTOR PLAN 6
Ct a/p -Right inguinal cystic structure with surrounding edema may represent   abscess or infected plug from prior hernia repair.  sx consulted recc- Recommend follow up US after completion of abx course.

## 2022-09-08 NOTE — PROGRESS NOTE ADULT - SUBJECTIVE AND OBJECTIVE BOX
Patient is a 77y old  Female who presents with a chief complaint of sepsis 2/2 UTI (07 Sep 2022 19:05)    pt seen in ed tele [ x ], reg med floor [   ], bed [ x ], chair at bedside [   ], a+o x3 [ x ], lethargic [  ],  nad [ x ]    Allergies    No Known Allergies        Vitals    T(F): 102 (09-08-22 @ 06:40), Max: 102 (09-08-22 @ 06:40)  HR: 121 (09-08-22 @ 06:40) (91 - 121)  BP: 140/79 (09-08-22 @ 06:40) (97/53 - 148/82)  RR: 18 (09-08-22 @ 06:40) (18 - 18)  SpO2: 96% (09-08-22 @ 06:40) (96% - 99%)  Wt(kg): --  CAPILLARY BLOOD GLUCOSE      POCT Blood Glucose.: 301 mg/dL (07 Sep 2022 13:56)      Labs                          11.0   16.66 )-----------( 269      ( 08 Sep 2022 05:30 )             30.9       09-07    130<L>  |  96  |  28<H>  ----------------------------<  227<H>  3.2<L>   |  23  |  2.09<H>    Ca    8.3<L>      07 Sep 2022 16:10  Mg     1.4     09-07    TPro  7.0  /  Alb  2.8<L>  /  TBili  0.5  /  DBili  x   /  AST  15  /  ALT  16  /  AlkPhos  66  09-07      CARDIAC MARKERS ( 07 Sep 2022 16:10 )  x     / x     / 149 U/L / x     / x          Troponin I, High Sensitivity Result: 12.2 ng/L (09-07-22 @ 16:10)        Radiology Results      Meds    MEDICATIONS  (STANDING):  cefTRIAXone   IVPB 1000 milliGRAM(s) IV Intermittent every 24 hours  heparin   Injectable 5000 Unit(s) SubCutaneous every 8 hours  sodium chloride 0.9%. 1000 milliLiter(s) (75 mL/Hr) IV Continuous <Continuous>      MEDICATIONS  (PRN):  ondansetron Injectable 4 milliGRAM(s) IV Push every 8 hours PRN Nausea and/or Vomiting      Physical Exam    Neuro :  no focal deficits  Respiratory: CTA B/L  CV: RRR, S1S2, no murmurs,   Abdominal: Soft, NT, ND +BS,  Extremities: No edema, + peripheral pulses    ASSESSMENT    sepsis,   uti,   electrolyte imbalance,   joseph,   syncope,   s/p fall,   nasal fx,   diarrhea,   r/o c diff,   h/o HTN,   DM,   HLD,   Hypothyroidism,   s/p cholecystectomy,   s/p hysterectomy  Depression      PLAN      cont tele,   cont rocephin,   cont bacid tid,   f/u blood and ucx   id cons  f/u ct abd pelv  cardio cons   f/u orthostatic bp  cont ivf  supplement potassium and mg as needed   f/u stool stool studies  f/u [id number]   lispro ss,   phys tx eval   cont current meds

## 2022-09-08 NOTE — CONSULT NOTE ADULT - ASSESSMENT
76 y/o F from home w/ PMHx of HTN, DM, HLD, Hypothyroidism, s/p cholecystectomy, s/p hysterectomy. She had been having generalized weakness, dizziness and dysuria for the past 3 days,diarrhea,CALLY,UTI,abnormal EKG.  1.Tele monitoring.  2.Diarrhea-stool cx,ABX.  3.CALLY-IVF,replace lytes.  4.Echocardiogram.  5.Hypothyroidism-check TFT's.  6.DM-Insulin.  7.GI and DVT prophylaxis.  
Patient is a 77y old  Female from home w/ PMHx of HTN, DM, HLD, Hypothyroidism, s/p cholecystectomy, s/p hysterectomy, now presents to the ER for evaluation of generalized weakness, dizziness and dysuria for the past 3 days, watery diarrhea and associated with vomiting  and lower abdominal pain. She has dizziness causing her to fall and hit her face. On admission, she found to have fever, tachycardia, Leukocytosis and positive Urine analysis. The CT abd/pelvis shows  Perinephric edema suggestive of bilateral pyelonephritis and Right inguinal cystic structure with surrounding edema may represent  abscess or infected plug from prior hernia repair. She has started on Ceftriaxone and The ID consult requested to assist with further evaluation and antibiotic management,     # Sepsis ( Fever + tachycardia + Leukocytosis )  # UTI - WBC >50 /HPF Sq Epi: x / Non Sq Epi: Moderate /HPF / Bacteria: Many /HPF  # Bilateral pyelonephritis  # Right inguinal suspected abscess    would recommend:    1. IR and Surgery evaluation for Right Inguinal cystic structure with surrounding edema may represent  abscess or infected plug   2. Follow up Urine and Blood cultures  3. Follow up stool studies  4. Continue Ceftriaxone 2g daily and Add Flagyl until work up is done  5. Monitor WBC count    d/w Covering NPGricel    will follow the patient with you and make further recommendation based on the clinical course and Lab results  Thank you for the opportunity to participate in Ms. CAMARGO's care    Attending Attestation:    Spent more than 65 minutes on total encounter, more than 50 % of the visit was spent counseling and/or coordinating care by the Attending physician.    
76 y/o Female w/ rt groin mass; Sepsis/ UTI. Nasal fracture s/p fall     -Rt groin mass likely enlarged inguinal Lymph Node, reactive due to undergoing infectious process. No evidence of abscess.   -Recommend follow up US after completion of abx course   -Abx as per ID   -Care as per medical team   -D/w Dr. Quezada and agrees

## 2022-09-08 NOTE — PATIENT PROFILE ADULT - FALL HARM RISK - HARM RISK INTERVENTIONS

## 2022-09-08 NOTE — PROGRESS NOTE ADULT - PROBLEM SELECTOR PLAN 9
home meds - amlodipine, losartan, lopressor  BP monitoring  HOLD BP meds- bp soft  consider adding amlodipine and lopressor if indicated  hold losartan in setting of CALLY.

## 2022-09-08 NOTE — DISCHARGE NOTE PROVIDER - NSDCMRMEDTOKEN_GEN_ALL_CORE_FT
amLODIPine 5 mg oral tablet: 1 tab(s) orally once a day  atorvastatin 80 mg oral tablet: 1 tab(s) orally once a day  Bactrim  mg-160 mg oral tablet: 1 tab(s) orally every 12 hours  fluconazole 200 mg oral tablet: 1 tab(s) orally once a day  levoFLOXacin 500 mg oral tablet: 1 tab(s) orally every 24 hours  losartan 100 mg oral tablet: 1 tab(s) orally once a day  metFORMIN 1000 mg oral tablet: 1 tab(s) orally 2 times a day  Metoprolol Tartrate 50 mg oral tablet: 1 tab(s) orally 2 times a day  metroNIDAZOLE 250 mg oral tablet: 1 tab(s) orally 3 times a day  Synthroid 75 mcg (0.075 mg) oral tablet: 1 tab(s) orally once a day   amLODIPine 5 mg oral tablet: 1 tab(s) orally once a day  atorvastatin 80 mg oral tablet: 1 tab(s) orally once a day  Bactrim  mg-160 mg oral tablet: 1 tab(s) orally every 12 hours  Ertapenem 1g IVPB daily until 9/24: 1 gram(s) intravenous once a day   fluconazole 200 mg oral tablet: 1 tab(s) orally once a day  levoFLOXacin 500 mg oral tablet: 1 tab(s) orally every 24 hours  losartan 100 mg oral tablet: 1 tab(s) orally once a day  metFORMIN 1000 mg oral tablet: 1 tab(s) orally 2 times a day  Metoprolol Tartrate 50 mg oral tablet: 1 tab(s) orally 2 times a day  metroNIDAZOLE 250 mg oral tablet: 1 tab(s) orally 3 times a day  Synthroid 75 mcg (0.075 mg) oral tablet: 1 tab(s) orally once a day   acetaminophen 325 mg oral tablet: 2 tab(s) orally every 6 hours, As needed, Temp greater or equal to 38C (100.4F), Mild Pain (1 - 3)  amLODIPine 5 mg oral tablet: 1 tab(s) orally once a day  atorvastatin 80 mg oral tablet: 1 tab(s) orally once a day  Ertapenem 1g IVPB daily until 9/24: 1 gram(s) intravenous once a day   losartan 100 mg oral tablet: 1 tab(s) orally once a day  metFORMIN 1000 mg oral tablet: 1 tab(s) orally 2 times a day  Metoprolol Tartrate 50 mg oral tablet: 1 tab(s) orally 2 times a day  Please draw CBC, BMP, ESR, CRP weekly until 9/24:   Synthroid 75 mcg (0.075 mg) oral tablet: 1 tab(s) orally once a day

## 2022-09-08 NOTE — DISCHARGE NOTE PROVIDER - HOSPITAL COURSE
ongoing 76 y/o F from home w/ PMHx of HTN, DM, HLD, Hypothyroidism, s/p cholecystectomy, s/p hysterectomy. She had been having generalized weakness, dizziness and dysuria for the past 3 days. In the morning of admission, she was in the bathroom for an episode of non-foul smelling watery diarrhea with associated lower abdominal pain. She also had an episode of nonbloody, non-bilous vomiting. Of note, she is also taking multiple antibiotics including Bactrim and Levaquin for UTI, Metronidazole for diarrhea and Fluconazole for urinary candidiasis. Additionally pt had dizziness causing her to fall and hit her face. She developed abrasions on the face near the bridge of the nose.  Admitted to telemetry for concern of syncopal episode. Cardiology Dr. Agrawal following. Echo normal EF with G1DD. CT Head was negative. CT Maxillofacial sinus is negative showing slight L nasal displacement. Pt was also found to have sepsis 2/2 UTI. started on abx, ivf. ID Dr. Rodriguez following. CXR no acute findings. CT a/p noted with possible inguinal cysts v. abscess. Surgery Team following. Pt also had US duplex B/L LE, neg for DVT. Hospital course complicated by ESBL bacteremia, urine culture grew ESBL and enterococcus faecaelis. Repeat blood cultures on 9/10 negative. Repeat CT A/P with IV contrast to r/o inguinal abscess shows improvement of fluid collection.  Pt is pending extended dwell placement in IR for IV Ertapenem until 9/24. Pt will follow up outpt with Dr. Dickens. Daughter updated on plan for home abx infusion services, understands and willing to assist mom daily.     pt is medically optimized for discharge, case management following pt for completion of home abx infusion setup.

## 2022-09-08 NOTE — PATIENT PROFILE ADULT - FALL HARM RISK - FALL HARM RISK
[de-identified] : The patient is doing well after joint replacement surgery. Written infectious precautions were reviewed. The patient will progress with physical therapy at this time and they will work on transitioning from requiring assistive devices for ambulation. Aspirin therapy will be discontinued at 1 month post surgery for the purpose of orthopedic thromboembolism prophylaxis. Return around the 6 week anniversary from surgery for follow-up evaluation. 
Other

## 2022-09-08 NOTE — PROGRESS NOTE ADULT - PROBLEM SELECTOR PLAN 5
dizziness, syncope and fall  pw dizziness and fall  EKG - NSR  c/w tele  s/p 3L NS bolus  s/p IVF NS at 75 cc/hr  f/u orthostatic VS  f/u Echo  Cardio (Dr. Lora)

## 2022-09-08 NOTE — PROGRESS NOTE ADULT - PROBLEM SELECTOR PLAN 1
WBC - 21, Lactate - 3.2  s/p 3L NS bolus  s/p Rocephin + Azithromycin  S/p IVF NS at 75 cc/hr  c/w Rocephin IV--> inc to 2g  flagyl added- ID recc  f/u UA, UCx, BCx  monitor cbc  ID Dr. Rodriguez

## 2022-09-08 NOTE — PROGRESS NOTE ADULT - PROBLEM SELECTOR PLAN 7
CTH - neg  CT Maxillofacial sinus - slight L nasal deviation  consulted Dr. Varma recc- f/u outpt in 7 -10 days for bone manipulation  conservative management  pain mgt.

## 2022-09-08 NOTE — CONSULT NOTE ADULT - SUBJECTIVE AND OBJECTIVE BOX
Attending:  Dr Quezada   Used  Services ID#[id number]    HPI:  76 y/o F from home w/ PMHx of HTN, DM, HLD, Hypothyroidism, PSHx cholecystectomy, appendectomy, hysterectomy, admitted to medical services w/ sepsis, UTI.  General surgery consult called re right inguinal cystic structure r/o abscess. Pt seen and examined at bedside, denies any pain to the right groin, no hx of surgical interventions to the right groin. Pt admits to generalized weakness and dizziness, s/p fall resulting in nasal fracture, admits to multiple episodes of diarrhea, and abdominal pain.        Denies fever, chills, SOB or CP.     As per chart, In the ED, VS stable, elevated WBC of 21.09, Lactate of 3.2, Cr of 2.09, low Na of 130, K of 3.2, Mg of 1.0.   CT Head was negative. CT Maxillofacial sinus is negative showing slight L nasal displacement.   Pt s/p 3L bolus of NS and a dose of Rocephin and Azithromycin.     GOC: FULL CODE (07 Sep 2022 19:05)      PAST MEDICAL & SURGICAL HISTORY:  Hypertension      Diabetes      Kidney stones      Depression      Hypothyroidism      HLD (hyperlipidemia)      S/P hysterectomy      S/P cholecystectomy          MEDICATIONS  (STANDING):  cefTRIAXone   IVPB 2000 milliGRAM(s) IV Intermittent every 24 hours  dextrose 5%. 1000 milliLiter(s) (100 mL/Hr) IV Continuous <Continuous>  dextrose 5%. 1000 milliLiter(s) (50 mL/Hr) IV Continuous <Continuous>  dextrose 50% Injectable 25 Gram(s) IV Push once  dextrose 50% Injectable 12.5 Gram(s) IV Push once  dextrose 50% Injectable 25 Gram(s) IV Push once  glucagon  Injectable 1 milliGRAM(s) IntraMuscular once  heparin   Injectable 5000 Unit(s) SubCutaneous every 8 hours  insulin lispro (ADMELOG) corrective regimen sliding scale   SubCutaneous three times a day before meals  insulin lispro (ADMELOG) corrective regimen sliding scale   SubCutaneous at bedtime  lactobacillus acidophilus 1 Tablet(s) Oral three times a day  metroNIDAZOLE  IVPB      metroNIDAZOLE  IVPB 500 milliGRAM(s) IV Intermittent once  metroNIDAZOLE  IVPB 500 milliGRAM(s) IV Intermittent every 8 hours  pantoprazole    Tablet 40 milliGRAM(s) Oral before breakfast    MEDICATIONS  (PRN):  acetaminophen     Tablet .. 650 milliGRAM(s) Oral every 6 hours PRN Temp greater or equal to 38C (100.4F), Mild Pain (1 - 3)  dextrose Oral Gel 15 Gram(s) Oral once PRN Blood Glucose LESS THAN 70 milliGRAM(s)/deciliter  ondansetron Injectable 4 milliGRAM(s) IV Push every 8 hours PRN Nausea and/or Vomiting      Allergies    No Known Allergies    Intolerances        SOCIAL HISTORY:  Unknown   FAMILY HISTORY:  Unknown     Vital Signs Last 24 Hrs  T(C): 36.9 (08 Sep 2022 15:41), Max: 38.9 (08 Sep 2022 06:40)  T(F): 98.4 (08 Sep 2022 15:41), Max: 102 (08 Sep 2022 06:40)  HR: 77 (08 Sep 2022 15:41) (77 - 121)  BP: 115/71 (08 Sep 2022 15:41) (115/71 - 148/82)  BP(mean): --  RR: 18 (08 Sep 2022 15:41) (18 - 18)  SpO2: 98% (08 Sep 2022 15:41) (96% - 98%)    Parameters below as of 08 Sep 2022 10:01  Patient On (Oxygen Delivery Method): room air        I&O's Summary      LABS:                        11.0   16.66 )-----------( 269      ( 08 Sep 2022 05:30 )             30.9     09-08    136  |  105  |  17  ----------------------------<  230<H>  3.3<L>   |  18<L>  |  1.31<H>    Ca    7.4<L>      08 Sep 2022 05:30  Phos  1.4     -  Mg     1.4     -08    TPro  5.8<L>  /  Alb  2.3<L>  /  TBili  0.4  /  DBili  x   /  AST  17  /  ALT  15  /  AlkPhos  55  -08      Urinalysis Basic - ( 07 Sep 2022 19:20 )    Color: Yellow / Appearance: Slightly Turbid / S.020 / pH: x  Gluc: x / Ketone: Negative  / Bili: Negative / Urobili: Negative   Blood: x / Protein: 100 / Nitrite: Negative   Leuk Esterase: Moderate / RBC: 5-10 /HPF / WBC >50 /HPF   Sq Epi: x / Non Sq Epi: Moderate /HPF / Bacteria: Many /HPF      CAPILLARY BLOOD GLUCOSE      POCT Blood Glucose.: 235 mg/dL (08 Sep 2022 09:27)    LIVER FUNCTIONS - ( 08 Sep 2022 05:30 )  Alb: 2.3 g/dL / Pro: 5.8 g/dL / ALK PHOS: 55 U/L / ALT: 15 U/L DA / AST: 17 U/L / GGT: x             RADIOLOGY & ADDITIONAL STUDIES:  < from: CT Abdomen and Pelvis No Cont (22 @ 09:14) >  PROCEDURE:  CT of the Abdomen and Pelvis was performed.  Sagittal and coronal reformats were performed.    LIMITATIONS: Evaluation of the solid organs, vascular structures and GI   tract is limited without oral and IV contrast. Motion artifact.    FINDINGS:  LOWER CHEST: Trace bilateral pleural effusionswith underlying passive   atelectasis, greater on the left. Trace pericardial effusion.    LIVER: Within normal limits.  BILE DUCTS: Normal caliber.  GALLBLADDER: Cholecystectomy.  SPLEEN: Within normal limits.  PANCREAS: Within normal limits.  ADRENALS: Within normal limits.  KIDNEYS/URETERS: Extensive bilateral perinephric fat stranding and fluid,   slightly greater on the left, suggestive of pyelonephritis. No   hydroureteronephrosis or calculi.    BLADDER: Within normal limits.  REPRODUCTIVE ORGANS: Hysterectomy.    BOWEL: No bowel obstruction. Appendix is surgically absent.  PERITONEUM: Small perihepatic, left paracolic gutter and pelvic ascites.  VESSELS: Atherosclerotic changes.  RETROPERITONEUM/LYMPH NODES: No lymphadenopathy.  ABDOMINAL WALL: 2.1 cm right inguinal cystic structure. Right inguinal   and proximal thigh edema. Small fat-containing umbilical hernia.   Injection site in the right abdominal wall.  BONES: Mild degenerative changes. Grade 1 L4-5 anterolisthesis.    IMPRESSION:  Evaluation of the solid organs, vascular structures and GI tract is   limited without oral and IV contrast.    Perinephric edema suggestive of bilateral pyelonephritis. Small ascites   and small bilateral pleural effusions.    Right inguinal cystic structure with surrounding edema may represent   abscess or infected plug from prior hernia repair. If DVT is suspected,   recommend ultrasound Doppler evaluation.    Findings were discussed with LUIS DANIEL DODGE [id number] 2022 10:57   AM by Dr. Brianna Roy with read back confirmation.    --- End of Report ---    < end of copied text >      < from: US Duplex Venous Lower Ext Complete, Bilateral (22 @ 11:57) >  FINDINGS:    RIGHT:  Normal compressibility of the RIGHT common femoral, femoral and popliteal   veins.  Doppler examination shows normal spontaneous and phasic flow.  No RIGHT calf vein thrombosis is detected.    LEFT:  Normal compressibility of the LEFT common femoral, femoral and popliteal   veins.  Doppler examination shows normal spontaneous and phasic flow.  No LEFT calf vein thrombosis is detected.    IMPRESSION:  No evidence of deep venous thrombosis in either lower extremity.          --- End of Report ---    < end of copied text >

## 2022-09-09 DIAGNOSIS — A49.8 OTHER BACTERIAL INFECTIONS OF UNSPECIFIED SITE: ICD-10-CM

## 2022-09-09 DIAGNOSIS — R18.8 OTHER ASCITES: ICD-10-CM

## 2022-09-09 DIAGNOSIS — Z02.9 ENCOUNTER FOR ADMINISTRATIVE EXAMINATIONS, UNSPECIFIED: ICD-10-CM

## 2022-09-09 LAB
-  CTX-M RESISTANCE MARKER: SIGNIFICANT CHANGE UP
-  ESBL: SIGNIFICANT CHANGE UP
24R-OH-CALCIDIOL SERPL-MCNC: 22 NG/ML — LOW (ref 30–80)
A1C WITH ESTIMATED AVERAGE GLUCOSE RESULT: 6.5 % — HIGH (ref 4–5.6)
ALBUMIN SERPL ELPH-MCNC: 2.1 G/DL — LOW (ref 3.5–5)
ALP SERPL-CCNC: 67 U/L — SIGNIFICANT CHANGE UP (ref 40–120)
ALT FLD-CCNC: 19 U/L DA — SIGNIFICANT CHANGE UP (ref 10–60)
ANION GAP SERPL CALC-SCNC: 11 MMOL/L — SIGNIFICANT CHANGE UP (ref 5–17)
AST SERPL-CCNC: 20 U/L — SIGNIFICANT CHANGE UP (ref 10–40)
BASOPHILS # BLD AUTO: 0.07 K/UL — SIGNIFICANT CHANGE UP (ref 0–0.2)
BASOPHILS NFR BLD AUTO: 0.4 % — SIGNIFICANT CHANGE UP (ref 0–2)
BILIRUB SERPL-MCNC: 0.3 MG/DL — SIGNIFICANT CHANGE UP (ref 0.2–1.2)
BUN SERPL-MCNC: 11 MG/DL — SIGNIFICANT CHANGE UP (ref 7–18)
C DIFF BY PCR RESULT: SIGNIFICANT CHANGE UP
C DIFF TOX GENS STL QL NAA+PROBE: SIGNIFICANT CHANGE UP
CALCIUM SERPL-MCNC: 7.5 MG/DL — LOW (ref 8.4–10.5)
CHLORIDE SERPL-SCNC: 102 MMOL/L — SIGNIFICANT CHANGE UP (ref 96–108)
CO2 SERPL-SCNC: 22 MMOL/L — SIGNIFICANT CHANGE UP (ref 22–31)
CREAT SERPL-MCNC: 1.15 MG/DL — SIGNIFICANT CHANGE UP (ref 0.5–1.3)
E COLI DNA BLD POS QL NAA+NON-PROBE: SIGNIFICANT CHANGE UP
E COLI SXT SPEC: DETECTED
EGFR: 49 ML/MIN/1.73M2 — LOW
EOSINOPHIL # BLD AUTO: 0 K/UL — SIGNIFICANT CHANGE UP (ref 0–0.5)
EOSINOPHIL NFR BLD AUTO: 0 % — SIGNIFICANT CHANGE UP (ref 0–6)
ESTIMATED AVERAGE GLUCOSE: 140 MG/DL — HIGH (ref 68–114)
GI PCR PANEL: DETECTED
GLUCOSE BLDC GLUCOMTR-MCNC: 195 MG/DL — HIGH (ref 70–99)
GLUCOSE BLDC GLUCOMTR-MCNC: 199 MG/DL — HIGH (ref 70–99)
GLUCOSE BLDC GLUCOMTR-MCNC: 209 MG/DL — HIGH (ref 70–99)
GLUCOSE BLDC GLUCOMTR-MCNC: 212 MG/DL — HIGH (ref 70–99)
GLUCOSE SERPL-MCNC: 243 MG/DL — HIGH (ref 70–99)
GRAM STN FLD: SIGNIFICANT CHANGE UP
HCT VFR BLD CALC: 31.5 % — LOW (ref 34.5–45)
HCV AB S/CO SERPL IA: 0.08 S/CO — SIGNIFICANT CHANGE UP (ref 0–0.99)
HCV AB SERPL-IMP: SIGNIFICANT CHANGE UP
HGB BLD-MCNC: 11.3 G/DL — LOW (ref 11.5–15.5)
IMM GRANULOCYTES NFR BLD AUTO: 1.1 % — SIGNIFICANT CHANGE UP (ref 0–1.5)
LYMPHOCYTES # BLD AUTO: 0.79 K/UL — LOW (ref 1–3.3)
LYMPHOCYTES # BLD AUTO: 5 % — LOW (ref 13–44)
MAGNESIUM SERPL-MCNC: 2 MG/DL — SIGNIFICANT CHANGE UP (ref 1.6–2.6)
MCHC RBC-ENTMCNC: 30.2 PG — SIGNIFICANT CHANGE UP (ref 27–34)
MCHC RBC-ENTMCNC: 35.9 GM/DL — SIGNIFICANT CHANGE UP (ref 32–36)
MCV RBC AUTO: 84.2 FL — SIGNIFICANT CHANGE UP (ref 80–100)
METHOD TYPE: SIGNIFICANT CHANGE UP
MONOCYTES # BLD AUTO: 1.01 K/UL — HIGH (ref 0–0.9)
MONOCYTES NFR BLD AUTO: 6.4 % — SIGNIFICANT CHANGE UP (ref 2–14)
NEUTROPHILS # BLD AUTO: 13.79 K/UL — HIGH (ref 1.8–7.4)
NEUTROPHILS NFR BLD AUTO: 87.1 % — HIGH (ref 43–77)
NRBC # BLD: 0 /100 WBCS — SIGNIFICANT CHANGE UP (ref 0–0)
PHOSPHATE SERPL-MCNC: 1.3 MG/DL — LOW (ref 2.5–4.5)
PLATELET # BLD AUTO: 299 K/UL — SIGNIFICANT CHANGE UP (ref 150–400)
POTASSIUM SERPL-MCNC: 3.2 MMOL/L — LOW (ref 3.5–5.3)
POTASSIUM SERPL-SCNC: 3.2 MMOL/L — LOW (ref 3.5–5.3)
PROT SERPL-MCNC: 6.2 G/DL — SIGNIFICANT CHANGE UP (ref 6–8.3)
RBC # BLD: 3.74 M/UL — LOW (ref 3.8–5.2)
RBC # FLD: 13.5 % — SIGNIFICANT CHANGE UP (ref 10.3–14.5)
SODIUM SERPL-SCNC: 135 MMOL/L — SIGNIFICANT CHANGE UP (ref 135–145)
TSH SERPL-MCNC: 2 UU/ML — SIGNIFICANT CHANGE UP (ref 0.34–4.82)
VIT B12 SERPL-MCNC: 434 PG/ML — SIGNIFICANT CHANGE UP (ref 232–1245)
WBC # BLD: 15.84 K/UL — HIGH (ref 3.8–10.5)
WBC # FLD AUTO: 15.84 K/UL — HIGH (ref 3.8–10.5)

## 2022-09-09 RX ORDER — POTASSIUM CHLORIDE 20 MEQ
40 PACKET (EA) ORAL ONCE
Refills: 0 | Status: COMPLETED | OUTPATIENT
Start: 2022-09-09 | End: 2022-09-09

## 2022-09-09 RX ORDER — MEROPENEM 1 G/30ML
1000 INJECTION INTRAVENOUS ONCE
Refills: 0 | Status: COMPLETED | OUTPATIENT
Start: 2022-09-09 | End: 2022-09-09

## 2022-09-09 RX ORDER — POTASSIUM PHOSPHATE, MONOBASIC POTASSIUM PHOSPHATE, DIBASIC 236; 224 MG/ML; MG/ML
30 INJECTION, SOLUTION INTRAVENOUS ONCE
Refills: 0 | Status: COMPLETED | OUTPATIENT
Start: 2022-09-09 | End: 2022-09-09

## 2022-09-09 RX ORDER — LEVOTHYROXINE SODIUM 125 MCG
75 TABLET ORAL DAILY
Refills: 0 | Status: DISCONTINUED | OUTPATIENT
Start: 2022-09-09 | End: 2022-09-15

## 2022-09-09 RX ORDER — MEROPENEM 1 G/30ML
INJECTION INTRAVENOUS
Refills: 0 | Status: DISCONTINUED | OUTPATIENT
Start: 2022-09-09 | End: 2022-09-11

## 2022-09-09 RX ORDER — POTASSIUM CHLORIDE 20 MEQ
40 PACKET (EA) ORAL EVERY 4 HOURS
Refills: 0 | Status: DISCONTINUED | OUTPATIENT
Start: 2022-09-09 | End: 2022-09-09

## 2022-09-09 RX ORDER — MEROPENEM 1 G/30ML
1000 INJECTION INTRAVENOUS EVERY 8 HOURS
Refills: 0 | Status: DISCONTINUED | OUTPATIENT
Start: 2022-09-10 | End: 2022-09-11

## 2022-09-09 RX ADMIN — POTASSIUM PHOSPHATE, MONOBASIC POTASSIUM PHOSPHATE, DIBASIC 83.33 MILLIMOLE(S): 236; 224 INJECTION, SOLUTION INTRAVENOUS at 09:46

## 2022-09-09 RX ADMIN — CEFTRIAXONE 100 MILLIGRAM(S): 500 INJECTION, POWDER, FOR SOLUTION INTRAMUSCULAR; INTRAVENOUS at 14:59

## 2022-09-09 RX ADMIN — HEPARIN SODIUM 5000 UNIT(S): 5000 INJECTION INTRAVENOUS; SUBCUTANEOUS at 05:48

## 2022-09-09 RX ADMIN — Medication 1 TABLET(S): at 14:59

## 2022-09-09 RX ADMIN — HEPARIN SODIUM 5000 UNIT(S): 5000 INJECTION INTRAVENOUS; SUBCUTANEOUS at 21:53

## 2022-09-09 RX ADMIN — HEPARIN SODIUM 5000 UNIT(S): 5000 INJECTION INTRAVENOUS; SUBCUTANEOUS at 14:59

## 2022-09-09 RX ADMIN — Medication 100 MILLIGRAM(S): at 14:58

## 2022-09-09 RX ADMIN — Medication 1: at 11:26

## 2022-09-09 RX ADMIN — MEROPENEM 100 MILLIGRAM(S): 1 INJECTION INTRAVENOUS at 23:04

## 2022-09-09 RX ADMIN — Medication 1 TABLET(S): at 05:48

## 2022-09-09 RX ADMIN — Medication 650 MILLIGRAM(S): at 09:45

## 2022-09-09 RX ADMIN — Medication 2: at 17:17

## 2022-09-09 RX ADMIN — Medication 2: at 08:10

## 2022-09-09 RX ADMIN — Medication 650 MILLIGRAM(S): at 10:20

## 2022-09-09 RX ADMIN — Medication 40 MILLIEQUIVALENT(S): at 09:45

## 2022-09-09 RX ADMIN — PANTOPRAZOLE SODIUM 40 MILLIGRAM(S): 20 TABLET, DELAYED RELEASE ORAL at 05:47

## 2022-09-09 RX ADMIN — Medication 100 MILLIGRAM(S): at 05:47

## 2022-09-09 RX ADMIN — Medication 100 MILLIGRAM(S): at 21:53

## 2022-09-09 RX ADMIN — Medication 1 TABLET(S): at 21:54

## 2022-09-09 NOTE — DIETITIAN INITIAL EVALUATION ADULT - PERTINENT MEDS FT
MEDICATIONS  (STANDING):  cefTRIAXone   IVPB 2000 milliGRAM(s) IV Intermittent every 24 hours  dextrose 5%. 1000 milliLiter(s) (50 mL/Hr) IV Continuous <Continuous>  dextrose 5%. 1000 milliLiter(s) (100 mL/Hr) IV Continuous <Continuous>  dextrose 50% Injectable 25 Gram(s) IV Push once  dextrose 50% Injectable 12.5 Gram(s) IV Push once  dextrose 50% Injectable 25 Gram(s) IV Push once  glucagon  Injectable 1 milliGRAM(s) IntraMuscular once  heparin   Injectable 5000 Unit(s) SubCutaneous every 8 hours  insulin lispro (ADMELOG) corrective regimen sliding scale   SubCutaneous three times a day before meals  insulin lispro (ADMELOG) corrective regimen sliding scale   SubCutaneous at bedtime  lactobacillus acidophilus 1 Tablet(s) Oral three times a day  levothyroxine 75 MICROGram(s) Oral daily  metroNIDAZOLE  IVPB      metroNIDAZOLE  IVPB 500 milliGRAM(s) IV Intermittent every 8 hours  pantoprazole    Tablet 40 milliGRAM(s) Oral before breakfast    MEDICATIONS  (PRN):  acetaminophen     Tablet .. 650 milliGRAM(s) Oral every 6 hours PRN Temp greater or equal to 38C (100.4F), Mild Pain (1 - 3)  dextrose Oral Gel 15 Gram(s) Oral once PRN Blood Glucose LESS THAN 70 milliGRAM(s)/deciliter  ondansetron Injectable 4 milliGRAM(s) IV Push every 8 hours PRN Nausea and/or Vomiting

## 2022-09-09 NOTE — PROGRESS NOTE ADULT - ASSESSMENT
76 y/o F from home w/ PMHx of HTN, DM, HLD, Hypothyroidism, s/p cholecystectomy, s/p hysterectomy. She had been having generalized weakness, dizziness and dysuria for the past 3 days,diarrhea,CALLY,UTI,abnormal EKG.  1.Tele monitoring.  2.Diarrhea-E coli-ABX.  3.CALLY-resolved,replace lytes.  4.Echocardiogram.  5.Hypothyroidism-synthroid.  6.DM-Insulin.  7.GI and DVT prophylaxis.

## 2022-09-09 NOTE — PROGRESS NOTE ADULT - PROBLEM SELECTOR PLAN 1
UA+  WBC - 21, Lactate - 3.2  s/p Rocephin + Azithromycin  c/w ceftriaxone and flagyl  blood cultures negative  f/u urine culture  monitor cbc  ID Dr. Rodriguez following

## 2022-09-09 NOTE — PROGRESS NOTE ADULT - PROBLEM SELECTOR PLAN 7
CTH - neg  CT Maxillofacial sinus - slight L nasal deviation  continue tylenol PRN for pain  f/u outpt with Dr. Varma in 7 -10 days for bone manipulation

## 2022-09-09 NOTE — PROGRESS NOTE ADULT - PROBLEM SELECTOR PLAN 9
on home meds - amlodipine, losartan, lopressor  goal <140/90  controlled off meds due to low BP and joseph   monitor BP 2

## 2022-09-09 NOTE — PROGRESS NOTE ADULT - SUBJECTIVE AND OBJECTIVE BOX
CHIEF COMPLAINT:Patient is a 77y old  Female who presents with a chief complaint of sepsis 2/2 UTI.Pt appears comfortable.    	  REVIEW OF SYSTEMS:  CONSTITUTIONAL: No fever, weight loss, or fatigue  EYES: No eye pain, visual disturbances, or discharge  ENT:  No difficulty hearing, tinnitus, vertigo; No sinus or throat pain  NECK: No pain or stiffness  RESPIRATORY: No cough, wheezing, chills or hemoptysis; No Shortness of Breath  CARDIOVASCULAR: No chest pain, palpitations, passing out, dizziness, or leg swelling  GASTROINTESTINAL: No abdominal or epigastric pain. No nausea, vomiting, or hematemesis; No diarrhea or constipation. No melena or hematochezia.  GENITOURINARY: No dysuria, frequency, hematuria, or incontinence  NEUROLOGICAL: No headaches, memory loss, loss of strength, numbness, or tremors  SKIN: No itching, burning, rashes, or lesions   LYMPH Nodes: No enlarged glands  ENDOCRINE: No heat or cold intolerance; No hair loss  MUSCULOSKELETAL: No joint pain or swelling; No muscle, back, or extremity pain  PSYCHIATRIC: No depression, anxiety, mood swings, or difficulty sleeping  HEME/LYMPH: No easy bruising, or bleeding gums  ALLERGY AND IMMUNOLOGIC: No hives or eczema	      PHYSICAL EXAM:  T(C): 37.6 (09-09-22 @ 07:43), Max: 38.1 (09-08-22 @ 19:23)  HR: 106 (09-09-22 @ 07:43) (77 - 111)  BP: 135/81 (09-09-22 @ 07:43) (115/71 - 148/75)  RR: 18 (09-09-22 @ 07:43) (17 - 18)  SpO2: 94% (09-09-22 @ 07:43) (94% - 98%)        Appearance: Normal	  HEENT:   Normal oral mucosa, PERRL, EOMI	  Lymphatic: No lymphadenopathy  Cardiovascular: Normal S1 S2, No JVD, No murmurs, No edema  Respiratory: Lungs clear to auscultation	  Psychiatry: A & O x 3, Mood & affect appropriate  Gastrointestinal:  Soft, Non-tender, + BS	  Skin: No rashes, No ecchymoses, No cyanosis	  Neurologic: Non-focal  Extremities: Normal range of motion, No clubbing, cyanosis or edema  Vascular: Peripheral pulses palpable 2+ bilaterally    MEDICATIONS  (STANDING):  cefTRIAXone   IVPB 2000 milliGRAM(s) IV Intermittent every 24 hours  dextrose 5%. 1000 milliLiter(s) (50 mL/Hr) IV Continuous <Continuous>  dextrose 5%. 1000 milliLiter(s) (100 mL/Hr) IV Continuous <Continuous>  dextrose 50% Injectable 25 Gram(s) IV Push once  dextrose 50% Injectable 12.5 Gram(s) IV Push once  dextrose 50% Injectable 25 Gram(s) IV Push once  glucagon  Injectable 1 milliGRAM(s) IntraMuscular once  heparin   Injectable 5000 Unit(s) SubCutaneous every 8 hours  insulin lispro (ADMELOG) corrective regimen sliding scale   SubCutaneous three times a day before meals  insulin lispro (ADMELOG) corrective regimen sliding scale   SubCutaneous at bedtime  lactobacillus acidophilus 1 Tablet(s) Oral three times a day  metroNIDAZOLE  IVPB      metroNIDAZOLE  IVPB 500 milliGRAM(s) IV Intermittent every 8 hours  pantoprazole    Tablet 40 milliGRAM(s) Oral before breakfast  potassium chloride   Powder 40 milliEquivalent(s) Oral once  potassium phosphate IVPB 30 milliMole(s) IV Intermittent once      Tele-sinus tach	  	  LABS:	 	    CARDIAC MARKERS ( 07 Sep 2022 16:10 )  x     / x     / 149 U/L / x     / x          Troponin I, High Sensitivity Result: 12.2 ng/L (09-07 @ 16:10)                            11.3   15.84 )-----------( 299      ( 09 Sep 2022 05:27 )             31.5     09-09    135  |  102  |  11  ----------------------------<  243<H>  3.2<L>   |  22  |  1.15    Ca    7.5<L>      09 Sep 2022 05:27  Phos  1.3     09-09  Mg     2.0     09-09    TPro  6.2  /  Alb  2.1<L>  /  TBili  0.3  /  DBili  x   /  AST  20  /  ALT  19  /  AlkPhos  67  09-09    proBNP: Serum Pro-Brain Natriuretic Peptide: 903 pg/mL (09-07 @ 16:10)      TSH: Thyroid Stimulating Hormone, Serum: 2.00 uU/mL (09-09 @ 05:27)      	  Shiga-like toxin-producing E.coli (STEC) stx1/stx2: Detected (09.08.22 @ 19:20)

## 2022-09-09 NOTE — PHARMACOTHERAPY INTERVENTION NOTE - COMMENTS
Recommended discontinuing metronidazole to avoid duplicate anaerobic coverage with meropenem, initiated for ESBL E. coli bacteremia, in the setting of 9/8 CT A/P findings of right inguinal cystic structure concerning for abscess or infected plug. 
Recommended escalating from ceftriaxone to meropenem 1g IV q8h (eGFR of 49 mL/min/m2 and downtrending serum creatinine) in the setting of ESBL E. coli bacteremia.

## 2022-09-09 NOTE — DIETITIAN INITIAL EVALUATION ADULT - ETIOLOGY
r/t acute illness E.Coli/UTI on abt therapy cefTRIAXone   IVPB 2000 milliGRAM(s) IV Intermittent every 24 hours

## 2022-09-09 NOTE — PROGRESS NOTE ADULT - PROBLEM SELECTOR PLAN 6
Ct a/p -Right inguinal cystic structure with surrounding edema may represent   abscess or infected plug from prior hernia repair.  sx consulted recc- Recommend follow up US after completion of abx course

## 2022-09-09 NOTE — PROGRESS NOTE ADULT - PROBLEM SELECTOR PLAN 10
on home med - metformin  a1c 6.5  hold home med  start insulin s.s.  FS ACHS  diabetic diet  controlled

## 2022-09-09 NOTE — DIETITIAN INITIAL EVALUATION ADULT - PERTINENT LABORATORY DATA
09-09    135  |  102  |  11  ----------------------------<  243<H>  3.2<L>   |  22  |  1.15    Ca    7.5<L>      09 Sep 2022 05:27  Phos  1.3     09-09  Mg     2.0     09-09    TPro  6.2  /  Alb  2.1<L>  /  TBili  0.3  /  DBili  x   /  AST  20  /  ALT  19  /  AlkPhos  67  09-09  POCT Blood Glucose.: 199 mg/dL (09-09-22 @ 11:08)  A1C with Estimated Average Glucose Result: 6.5 % (09-09-22 @ 05:27)

## 2022-09-09 NOTE — PROGRESS NOTE ADULT - SUBJECTIVE AND OBJECTIVE BOX
Patient is a 77y old  Female who presents with a chief complaint of sepsis 2/2 UTI (09 Sep 2022 09:08)    pt seen in icu [  ], reg med floor [   ], bed [  ], chair at bedside [   ], a+o x3 [  ], lethargic [  ],  nad [  ]    chou [  ], ngt [  ], peg [  ], et tube [  ], cent line [  ], picc line [  ]        Allergies    No Known Allergies        Vitals    T(F): 99.7 (09-09-22 @ 07:43), Max: 100.5 (09-08-22 @ 19:23)  HR: 106 (09-09-22 @ 07:43) (77 - 111)  BP: 135/81 (09-09-22 @ 07:43) (115/71 - 148/75)  RR: 18 (09-09-22 @ 07:43) (17 - 18)  SpO2: 94% (09-09-22 @ 07:43) (94% - 98%)  Wt(kg): --  CAPILLARY BLOOD GLUCOSE      POCT Blood Glucose.: 212 mg/dL (09 Sep 2022 07:49)      Labs                          11.3   15.84 )-----------( 299      ( 09 Sep 2022 05:27 )             31.5       09-09    135  |  102  |  11  ----------------------------<  243<H>  3.2<L>   |  22  |  1.15    Ca    7.5<L>      09 Sep 2022 05:27  Phos  1.3     09-09  Mg     2.0     09-09    TPro  6.2  /  Alb  2.1<L>  /  TBili  0.3  /  DBili  x   /  AST  20  /  ALT  19  /  AlkPhos  67  09-09      CARDIAC MARKERS ( 07 Sep 2022 16:10 )  x     / x     / 149 U/L / x     / x          Troponin I, High Sensitivity Result: 12.2 ng/L (09-07-22 @ 16:10)    .Stool Other, stool  09-08 @ 14:20   Testing in progress  --  --      .Blood Blood  09-07 @ 16:57   No growth to date.  --  --      .Blood Blood  09-07 @ 16:50   No growth to date.  --  --          Radiology Results      Meds    MEDICATIONS  (STANDING):  cefTRIAXone   IVPB 2000 milliGRAM(s) IV Intermittent every 24 hours  dextrose 5%. 1000 milliLiter(s) (50 mL/Hr) IV Continuous <Continuous>  dextrose 5%. 1000 milliLiter(s) (100 mL/Hr) IV Continuous <Continuous>  dextrose 50% Injectable 25 Gram(s) IV Push once  dextrose 50% Injectable 12.5 Gram(s) IV Push once  dextrose 50% Injectable 25 Gram(s) IV Push once  glucagon  Injectable 1 milliGRAM(s) IntraMuscular once  heparin   Injectable 5000 Unit(s) SubCutaneous every 8 hours  insulin lispro (ADMELOG) corrective regimen sliding scale   SubCutaneous three times a day before meals  insulin lispro (ADMELOG) corrective regimen sliding scale   SubCutaneous at bedtime  lactobacillus acidophilus 1 Tablet(s) Oral three times a day  levothyroxine 75 MICROGram(s) Oral daily  metroNIDAZOLE  IVPB      metroNIDAZOLE  IVPB 500 milliGRAM(s) IV Intermittent every 8 hours  pantoprazole    Tablet 40 milliGRAM(s) Oral before breakfast  potassium chloride   Powder 40 milliEquivalent(s) Oral once  potassium phosphate IVPB 30 milliMole(s) IV Intermittent once      MEDICATIONS  (PRN):  acetaminophen     Tablet .. 650 milliGRAM(s) Oral every 6 hours PRN Temp greater or equal to 38C (100.4F), Mild Pain (1 - 3)  dextrose Oral Gel 15 Gram(s) Oral once PRN Blood Glucose LESS THAN 70 milliGRAM(s)/deciliter  ondansetron Injectable 4 milliGRAM(s) IV Push every 8 hours PRN Nausea and/or Vomiting      Physical Exam    Neuro :  no focal deficits  Respiratory: CTA B/L  CV: RRR, S1S2, no murmurs,   Abdominal: Soft, NT, ND +BS,  Extremities: No edema, + peripheral pulses    ASSESSMENT    Sepsis    Hypertension    Diabetes    Kidney stones    Depression    Hypothyroidism    HLD (hyperlipidemia)    S/P hysterectomy    S/P cholecystectomy        PLAN     Patient is a 77y old  Female who presents with a chief complaint of sepsis 2/2 UTI (09 Sep 2022 09:08)    pt seen in tele [ x ], reg med floor [   ], bed [ x ], chair at bedside [   ], a+o x3 [ x ], lethargic [  ],  nad [ x ]      Allergies    No Known Allergies        Vitals    T(F): 99.7 (09-09-22 @ 07:43), Max: 100.5 (09-08-22 @ 19:23)  HR: 106 (09-09-22 @ 07:43) (77 - 111)  BP: 135/81 (09-09-22 @ 07:43) (115/71 - 148/75)  RR: 18 (09-09-22 @ 07:43) (17 - 18)  SpO2: 94% (09-09-22 @ 07:43) (94% - 98%)  Wt(kg): --  CAPILLARY BLOOD GLUCOSE      POCT Blood Glucose.: 212 mg/dL (09 Sep 2022 07:49)      Labs                          11.3   15.84 )-----------( 299      ( 09 Sep 2022 05:27 )             31.5       09-09    135  |  102  |  11  ----------------------------<  243<H>  3.2<L>   |  22  |  1.15    Ca    7.5<L>      09 Sep 2022 05:27  Phos  1.3     09-09  Mg     2.0     09-09    TPro  6.2  /  Alb  2.1<L>  /  TBili  0.3  /  DBili  x   /  AST  20  /  ALT  19  /  AlkPhos  67  09-09      CARDIAC MARKERS ( 07 Sep 2022 16:10 )  x     / x     / 149 U/L / x     / x          Troponin I, High Sensitivity Result: 12.2 ng/L (09-07-22 @ 16:10)    .Stool Other, stool  09-08 @ 14:20   Testing in progress  --  --  GI PCR Panel Stool (09.08.22 @ 19:20)   Shiga-like toxin-producing E.coli (STEC) stx1/stx2: Detected        .Blood Blood  09-07 @ 16:57   No growth to date.  --  --      .Blood Blood  09-07 @ 16:50   No growth to date.  --  --          Radiology Results    < from: CT Abdomen and Pelvis No Cont (09.08.22 @ 09:14) >  IMPRESSION:  Evaluation of the solid organs, vascular structures and GI tract is   limited without oral and IV contrast.    Perinephric edema suggestive of bilateral pyelonephritis. Small ascites   and small bilateral pleural effusions.    Right inguinal cystic structure with surrounding edema may represent   abscess or infected plug from prior hernia repair. If DVT is suspected,   recommend ultrasound Doppler evaluation.    < end of copied text >     < from: US Duplex Venous Lower Ext Complete, Bilateral (09.08.22 @ 11:57) >  IMPRESSION:  No evidence of deep venous thrombosis in either lower extremity.    < end of copied text >            Meds    MEDICATIONS  (STANDING):  cefTRIAXone   IVPB 2000 milliGRAM(s) IV Intermittent every 24 hours  dextrose 5%. 1000 milliLiter(s) (50 mL/Hr) IV Continuous <Continuous>  dextrose 5%. 1000 milliLiter(s) (100 mL/Hr) IV Continuous <Continuous>  dextrose 50% Injectable 25 Gram(s) IV Push once  dextrose 50% Injectable 12.5 Gram(s) IV Push once  dextrose 50% Injectable 25 Gram(s) IV Push once  glucagon  Injectable 1 milliGRAM(s) IntraMuscular once  heparin   Injectable 5000 Unit(s) SubCutaneous every 8 hours  insulin lispro (ADMELOG) corrective regimen sliding scale   SubCutaneous three times a day before meals  insulin lispro (ADMELOG) corrective regimen sliding scale   SubCutaneous at bedtime  lactobacillus acidophilus 1 Tablet(s) Oral three times a day  levothyroxine 75 MICROGram(s) Oral daily  metroNIDAZOLE  IVPB      metroNIDAZOLE  IVPB 500 milliGRAM(s) IV Intermittent every 8 hours  pantoprazole    Tablet 40 milliGRAM(s) Oral before breakfast  potassium chloride   Powder 40 milliEquivalent(s) Oral once  potassium phosphate IVPB 30 milliMole(s) IV Intermittent once      MEDICATIONS  (PRN):  acetaminophen     Tablet .. 650 milliGRAM(s) Oral every 6 hours PRN Temp greater or equal to 38C (100.4F), Mild Pain (1 - 3)  dextrose Oral Gel 15 Gram(s) Oral once PRN Blood Glucose LESS THAN 70 milliGRAM(s)/deciliter  ondansetron Injectable 4 milliGRAM(s) IV Push every 8 hours PRN Nausea and/or Vomiting      Physical Exam      Neuro :  no focal deficits  Respiratory: CTA B/L  CV: RRR, S1S2, no murmurs,   Abdominal: Soft, NT, ND +BS,  Extremities: No edema, + peripheral pulses    ASSESSMENT    sepsis,   uti,   electrolyte imbalance,   joseph,   syncope,   s/p fall,   nasal fx,   diarrhea,   r/o c diff,   h/o HTN,   DM,   HLD,   Hypothyroidism,   s/p cholecystectomy,   s/p hysterectomy  Depression      PLAN      cont tele,   cont bacid tid,   blood cx neg noted above  f/u ucx   id f/u   IR and Surgery evaluation for Right Inguinal cystic structure with surrounding edema may represent  abscess or infected plug   Follow up stool studies  Continue Ceftriaxone 2g daily and Add Flagyl until work up is done  ct abd pelv with Evaluation of the solid organs, vascular structures and GI tract is limited without oral and IV contrast.  Perinephric edema suggestive of bilateral pyelonephritis. Small ascites and small bilateral pleural effusions.  Right inguinal cystic structure with surrounding edema may represent abscess or infected plug from prior hernia repair. If DVT is suspected,   recommend ultrasound Doppler evaluation noted above.  doppler le b/l with No evidence of deep venous thrombosis in either lower extremity noted above.  surg f/u   Rt groin mass likely enlarged inguinal Lymph Node, reactive due to undergoing infectious process.   No evidence of abscess.   Recommend follow up US after completion of abx course   cardio f/u   f/u echo   f/u orthostatic bp  cont ivf  supplement potassium and mg as needed   stool stool studies with Shiga-like toxin-producing E.coli (STEC) stx1/stx2: Detected noted above  f/u [id number]   lispro ss,   phys tx eval   cont current meds

## 2022-09-09 NOTE — PROGRESS NOTE ADULT - PROBLEM SELECTOR PLAN 3
pt p/w multiple episodes of diarrhea with recent abx use outpt  c/w c diff isolation  probiotic TID  plan as above

## 2022-09-09 NOTE — PROGRESS NOTE ADULT - ASSESSMENT
76 y/o Female w/ rt groin mass; Sepsis/ UTI. Nasal fracture s/p fall     -Rt groin mass likely enlarged inguinal Lymph Node, reactive due to undergoing infectious process. No evidence of abscess.   -Recommend follow up US after completion of abx course   -Abx as per ID   -Care as per medical team   -D/w Dr. Quezada and agrees

## 2022-09-09 NOTE — PROGRESS NOTE ADULT - SUBJECTIVE AND OBJECTIVE BOX
Patient is a 77y old  Female who presents with a chief complaint of Sepsis     (09 Sep 2022 14:33)    OVERNIGHT EVENTS: on telemetry, sinus. No events reported.     REVIEW OF SYSTEMS: Ethiopian speaking  #[id number]  CONSTITUTIONAL: No fever, chills  ENMT:  No difficulty hearing, no change in vision  NECK: No pain or stiffness  RESPIRATORY: No cough, SOB  CARDIOVASCULAR: No chest pain, palpitations  GASTROINTESTINAL: +abdominal pain. +diarrhea. No nausea, vomiting.  GENITOURINARY: No dysuria  NEUROLOGICAL: No HA  SKIN: No itching, burning, rashes, or lesions   LYMPH NODES: No enlarged glands  ENDOCRINE: No heat or cold intolerance; No hair loss  MUSCULOSKELETAL: No joint pain or swelling; No muscle, back, or extremity pain  PSYCHIATRIC: No depression, anxiety  HEME/LYMPH: No easy bruising, or bleeding gums    T(C): 37.1 (22 @ 11:40), Max: 38.1 (22 @ 19:23)  HR: 104 (22 @ 11:40) (95 - 111)  BP: 136/51 (22 @ 11:40) (118/74 - 148/75)  RR: 18 (22 @ 11:40) (17 - 18)  SpO2: 94% (22 @ 11:40) (94% - 96%)  Wt(kg): --Vital Signs Last 24 Hrs  T(C): 37.1 (09 Sep 2022 11:40), Max: 38.1 (08 Sep 2022 19:23)  T(F): 98.8 (09 Sep 2022 11:40), Max: 100.5 (08 Sep 2022 19:23)  HR: 104 (09 Sep 2022 11:40) (95 - 111)  BP: 136/51 (09 Sep 2022 11:40) (118/74 - 148/75)  BP(mean): --  RR: 18 (09 Sep 2022 11:40) (17 - 18)  SpO2: 94% (09 Sep 2022 11:40) (94% - 96%)    Parameters below as of 09 Sep 2022 07:43  Patient On (Oxygen Delivery Method): room air      MEDICATIONS  (STANDING):  cefTRIAXone   IVPB 2000 milliGRAM(s) IV Intermittent every 24 hours  dextrose 5%. 1000 milliLiter(s) (50 mL/Hr) IV Continuous <Continuous>  dextrose 5%. 1000 milliLiter(s) (100 mL/Hr) IV Continuous <Continuous>  dextrose 50% Injectable 25 Gram(s) IV Push once  dextrose 50% Injectable 12.5 Gram(s) IV Push once  dextrose 50% Injectable 25 Gram(s) IV Push once  glucagon  Injectable 1 milliGRAM(s) IntraMuscular once  heparin   Injectable 5000 Unit(s) SubCutaneous every 8 hours  insulin lispro (ADMELOG) corrective regimen sliding scale   SubCutaneous three times a day before meals  insulin lispro (ADMELOG) corrective regimen sliding scale   SubCutaneous at bedtime  lactobacillus acidophilus 1 Tablet(s) Oral three times a day  levothyroxine 75 MICROGram(s) Oral daily  metroNIDAZOLE  IVPB      metroNIDAZOLE  IVPB 500 milliGRAM(s) IV Intermittent every 8 hours  pantoprazole    Tablet 40 milliGRAM(s) Oral before breakfast    MEDICATIONS  (PRN):  acetaminophen     Tablet .. 650 milliGRAM(s) Oral every 6 hours PRN Temp greater or equal to 38C (100.4F), Mild Pain (1 - 3)  dextrose Oral Gel 15 Gram(s) Oral once PRN Blood Glucose LESS THAN 70 milliGRAM(s)/deciliter  ondansetron Injectable 4 milliGRAM(s) IV Push every 8 hours PRN Nausea and/or Vomiting      PHYSICAL EXAM:  GENERAL: awake, alert, NAD  EYES: +left periorbital ecchymosis. clear conjunctiva;  ENMT: Moist mucous membranes  NECK: Supple, No JVD, Normal thyroid  CHEST/LUNG: Clear to auscultation bilaterally; No rales, rhonchi, wheezing, or rubs  HEART: S1, S2, Regular rate and rhythm  ABDOMEN: +Left CVA tenderness >right. Soft, Nontender, Nondistended; Bowel sounds present  NEURO: Alert & Oriented X3  EXTREMITIES: No LE edema, no calf tenderness  LYMPH: No lymphadenopathy noted  SKIN: No rashes or lesions    Consultant(s) Notes Reviewed:  [x ] YES  [ ] NO  Care Discussed with Consultants/Other Providers [ x] YES  [ ] NO    LABS:                        11.3   15.84 )-----------( 299      ( 09 Sep 2022 05:27 )             31.5     09-09    135  |  102  |  11  ----------------------------<  243<H>  3.2<L>   |  22  |  1.15    Ca    7.5<L>      09 Sep 2022 05:27  Phos  1.3       Mg     2.0         TPro  6.2  /  Alb  2.1<L>  /  TBili  0.3  /  DBili  x   /  AST  20  /  ALT  19  /  AlkPhos  67        CAPILLARY BLOOD GLUCOSE      POCT Blood Glucose.: 199 mg/dL (09 Sep 2022 11:08)  POCT Blood Glucose.: 212 mg/dL (09 Sep 2022 07:49)  POCT Blood Glucose.: 190 mg/dL (08 Sep 2022 22:07)  POCT Blood Glucose.: 223 mg/dL (08 Sep 2022 17:26)        Urinalysis Basic - ( 07 Sep 2022 19:20 )    Color: Yellow / Appearance: Slightly Turbid / S.020 / pH: x  Gluc: x / Ketone: Negative  / Bili: Negative / Urobili: Negative   Blood: x / Protein: 100 / Nitrite: Negative   Leuk Esterase: Moderate / RBC: 5-10 /HPF / WBC >50 /HPF   Sq Epi: x / Non Sq Epi: Moderate /HPF / Bacteria: Many /HPF        RADIOLOGY & ADDITIONAL TESTS:  < from: US Duplex Venous Lower Ext Complete, Bilateral (22 @ 11:57) >    ACC: [id number] EXAM:  US DPLX LWR EXT VEINS COMPL BI                          PROCEDURE DATE:  2022          INTERPRETATION:  CLINICAL INFORMATION: Sepsis    COMPARISON: None available.    TECHNIQUE: Duplex sonography of the BILATERAL LOWER extremity veins with   color and spectral Doppler, with and without compression.    FINDINGS:    RIGHT:  Normal compressibility of the RIGHT common femoral, femoral and popliteal   veins.  Doppler examination shows normal spontaneous and phasic flow.  No RIGHT calf vein thrombosis is detected.    LEFT:  Normal compressibility of the LEFT common femoral, femoral and popliteal   veins.  Doppler examination shows normal spontaneous and phasic flow.  No LEFT calf vein thrombosis is detected.    IMPRESSION:  No evidence of deep venous thrombosis in either lower extremity.          --- End of Report ---            JENNIFER DAVIS MD; Attending Radiologist  This document has been electronically signed. Sep  8 2022 12:27PM    < end of copied text >  < from: CT Abdomen and Pelvis No Cont (22 @ 09:14) >    ACC: [id number] EXAM:  CT ABDOMEN AND PELVIS                          PROCEDURE DATE:  2022          INTERPRETATION:  CLINICAL INFORMATION: 77 years  Female with sepsis.   History of hypertension, diabetes, hyperlipidemia, hypothyroidism,   hysterectomy and cholecystectomy. Status post laparoscopic appendectomy   2019    COMPARISON: 2019    CONTRAST/COMPLICATIONS:  IV Contrast: None  Oral Contrast: None  Complications: None    PROCEDURE:  CT of the Abdomen and Pelvis was performed.  Sagittal and coronal reformats were performed.    LIMITATIONS: Evaluation of the solid organs, vascular structures and GI   tract is limited without oral and IV contrast. Motion artifact.    FINDINGS:  LOWER CHEST: Trace bilateral pleural effusionswith underlying passive   atelectasis, greater on the left. Trace pericardial effusion.    LIVER: Within normal limits.  BILE DUCTS: Normal caliber.  GALLBLADDER: Cholecystectomy.  SPLEEN: Within normal limits.  PANCREAS: Within normal limits.  ADRENALS: Within normal limits.  KIDNEYS/URETERS: Extensive bilateral perinephric fat stranding and fluid,   slightly greater on the left, suggestive of pyelonephritis. No   hydroureteronephrosis or calculi.    BLADDER: Within normal limits.  REPRODUCTIVE ORGANS: Hysterectomy.    BOWEL: No bowel obstruction. Appendix is surgically absent.  PERITONEUM: Small perihepatic, left paracolic gutter and pelvic ascites.  VESSELS: Atherosclerotic changes.  RETROPERITONEUM/LYMPH NODES: No lymphadenopathy.  ABDOMINAL WALL: 2.1 cm right inguinal cystic structure. Right inguinal   and proximal thigh edema. Small fat-containing umbilical hernia.   Injection site in the right abdominal wall.  BONES: Mild degenerative changes. Grade 1 L4-5 anterolisthesis.    IMPRESSION:  Evaluation of the solid organs, vascular structures and GI tract is   limited without oral and IV contrast.    Perinephric edema suggestive of bilateral pyelonephritis. Small ascites   and small bilateral pleural effusions.    Right inguinal cystic structure with surrounding edema may represent   abscess or infected plug from prior hernia repair. If DVT is suspected,   recommend ultrasound Doppler evaluation.    Findings were discussed with LUIS DANIEL DODGE [id number] 2022 10:57   AM by Dr. Brianna Roy with read back confirmation.    --- End of Report ---        < end of copied text >    Imaging Personally Reviewed:  [ ] YES  [ ] NO

## 2022-09-09 NOTE — CHART NOTE - NSCHARTNOTEFT_GEN_A_CORE
EVENT: Culture - Blood (09.07.22 @ 16:50)    -  ESBL: Detec    -  Escherichia coli: Detec    -  CTX-M Resistance Marker: Detec    Gram Stain:   Growth in aerobic bottle: Gram Negative Rods    Specimen Source: .Blood Blood    Organism: Blood Culture PCR    Culture Results:   Growth in aerobic bottle: Gram Negative Rods    BRIEF HPI: 78 y/o F from home w/ PMHx of HTN, DM, HLD, Hypothyroidism, s/p cholecystectomy, s/p hysterectomy. She had been having generalized weakness, dizziness and dysuria for the past 3 days. In the morning of admission, she was in the bathroom for an episode of non-foul smelling watery diarrhea with associated lower abdominal pain. She also had an episode of nonbloody, non-bilous vomiting. Of note, she is also taking multiple antibiotics including Bactrim and Levaquin for UTI, Metronidazole for diarrhea and Fluconazole for urinary candidiasis. Additionally pt had dizziness causing her to fall and hit her face. She developed abrasions on the face near the bridge of the nose.  Admitted to telemetry for concern of syncopal episode. Cardiology Dr. Gonzalez following. CT Head was negative. CT Maxillofacial sinus is negative showing slight L nasal displacement. Pt was also found to have sepsis 2/2 UTI. started on abx, ivf. ID Dr. Rodriguez following. CXR no acute findings. CT a/p noted with possible inguinal cysts v. abscess. Surgery Team following. Pt is awaiting completion of abx to eval for possible abscess. Pt also had US duplex B/L LE, neg for DVT. Pt is pending Echo, PT consult.  Per surg enlarged inguinal Lymph Node, reactive due to undergoing infectious process. No evidence of abscess. Recommend follow up US after completion of abx course. Now bacteremia.    OBJECTIVE:  Vital Signs Last 24 Hrs  T(C): 37.7 (09 Sep 2022 19:56), Max: 37.7 (09 Sep 2022 19:56)  T(F): 99.9 (09 Sep 2022 19:56), Max: 99.9 (09 Sep 2022 19:56)  HR: 108 (09 Sep 2022 19:56) (93 - 111)  BP: 148/85 (09 Sep 2022 19:56) (118/74 - 148/85)  BP(mean): --  RR: 18 (09 Sep 2022 19:56) (17 - 18)  SpO2: 95% (09 Sep 2022 19:56) (94% - 98%)    Parameters below as of 09 Sep 2022 19:56  Patient On (Oxygen Delivery Method): room air    FOCUSED PHYSICAL EXAM: see in pt physical exam    LABS:                        11.3   15.84 )-----------( 299      ( 09 Sep 2022 05:27 )             31.5     09-09    135  |  102  |  11  ----------------------------<  243<H>  3.2<L>   |  22  |  1.15    Ca    7.5<L>      09 Sep 2022 05:27  Phos  1.3     09-09  Mg     2.0     09-09    TPro  6.2  /  Alb  2.1<L>  /  TBili  0.3  /  DBili  x   /  AST  20  /  ALT  19  /  AlkPhos  67  09-09    Urine Microscopic-Add On (NC) (09.07.22 @ 19:20)    Bacteria: Many /HPF    Epithelial Cells: Moderate /HPF    Red Blood Cell - Urine: 5-10 /HPF    White Blood Cell - Urine: >50 /HPF    PROBLEM: Bacteremia probably due to ESBL, Escherichia coli UTI  PLAN:   1. Meropenem IVPB 1000 marce GRAM(s) IV Intermittent once now and Q 8H    FOLLOW UP / RESULT: sensitivity    Discussed with stewardship pharmacist Roly, recommended escalating from ceftriaxone to meropenem 1g IV q8h (eGFR of 49 mL/min/m2 and downtrending serum creatinine) in the setting of ESBL E. coli bacteremia.

## 2022-09-09 NOTE — PROGRESS NOTE ADULT - SUBJECTIVE AND OBJECTIVE BOX
INTERVAL HPI/OVERNIGHT EVENTS:  Patient seen and examined at bedside.    Pt resting comfortably. No acute complaints.   Denies any pain to groin. Tolerating regular diet, denies N/V, abdominal pain. Patient voiding without complaints.   Admits to surgical history of hysterectomy.   Denies chest pain, shortness of breath, fever, chills or any other constitutional symptoms.       Vital Signs Last 24 Hrs  T(C): 36.8 (09 Sep 2022 16:09), Max: 38.1 (08 Sep 2022 19:23)  T(F): 98.2 (09 Sep 2022 16:09), Max: 100.5 (08 Sep 2022 19:23)  HR: 93 (09 Sep 2022 16:09) (93 - 111)  BP: 143/60 (09 Sep 2022 16:09) (118/74 - 148/75)  BP(mean): --  RR: 18 (09 Sep 2022 16:09) (17 - 18)  SpO2: 98% (09 Sep 2022 16:09) (94% - 98%)    Parameters below as of 09 Sep 2022 16:09  Patient On (Oxygen Delivery Method): room air        Physical:  General: A&Ox3. NAD.  Respiratory: non labored  Skin: warm  Abdomen: Soft, nondistended, mild tenderness to palpation in LLQ and mid lower abdomen, no rebound tenderness, no guarding,   +femoral pulse     I&O's Detail      LABS:                        11.3   15.84 )-----------( 299      ( 09 Sep 2022 05:27 )             31.5             09-09    135  |  102  |  11  ----------------------------<  243<H>  3.2<L>   |  22  |  1.15    Ca    7.5<L>      09 Sep 2022 05:27  Phos  1.3     09-09  Mg     2.0     09-09    TPro  6.2  /  Alb  2.1<L>  /  TBili  0.3  /  DBili  x   /  AST  20  /  ALT  19  /  AlkPhos  67  09-09

## 2022-09-09 NOTE — PROGRESS NOTE ADULT - ASSESSMENT
76 y/o F from home w/ PMHx of HTN, DM, HLD, Hypothyroidism, s/p cholecystectomy, s/p hysterectomy. She had been having generalized weakness, dizziness and dysuria for the past 3 days. In the morning of admission, she was in the bathroom for an episode of non-foul smelling watery diarrhea with associated lower abdominal pain. She also had an episode of nonbloody, non-bilous vomiting. Of note, she is also taking multiple antibiotics including Bactrim and Levaquin for UTI, Metronidazole for diarrhea and Fluconazole for urinary candidiasis. Additionally pt had dizziness causing her to fall and hit her face. She developed abrasions on the face near the bridge of the nose.  Admitted to telemetry for concern of syncopal episode. Cardiology Dr. Agrawal following. CT Head was negative. CT Maxillofacial sinus is negative showing slight L nasal displacement. Pt was also found to have sepsis 2/2 UTI. started on abx, ivf. ID Dr. Rodriguez following. CXR no acute findings. CT a/p noted with possible inguinal cysts v. abscess. Surgery Team following. Pt is awaiting completion of abx to eval for possible abscess. Pt also had US duplex B/L LE, neg for DVT. Pt is pending Echo, PT consult, urine culture results.

## 2022-09-09 NOTE — PROGRESS NOTE ADULT - PROBLEM SELECTOR PLAN 5
pt p/w dizziness, syncope and fall  EKG - abnormal  c/w tele  f/u orthostatic VS  f/u Echo  Cardio Dr. Agrawal following

## 2022-09-09 NOTE — DIETITIAN INITIAL EVALUATION ADULT - OTHER INFO
77 years old female on contact precaution visited earlier today. Pt Japanese speaking, son arrived later on translated for pt. As per son pt with decrease in PO intake  3-4 days PTA, bedboundx 4 days PTA, sleeping for extended hours. Maybe with weight loss unsure how much but -135 lbs as per son. Pt with diarrhea 4-5x/day, on lactobacillus acidophilus 1 Tablet(s) Oral three times a day and antibiotic treatment (cefTRIAXone   IVPB 2000 milliGRAM(s) IV Intermittent every 24 hours) . Eating fair in the facility, son present during mealtime helping mom with set up. brought food from home as well, pt favorite. Son was educated on low fiber diet and CHO consistency diet. Verbalized understanding. Pt for d/c home once medically stabled.

## 2022-09-10 DIAGNOSIS — J98.11 ATELECTASIS: ICD-10-CM

## 2022-09-10 DIAGNOSIS — I27.20 PULMONARY HYPERTENSION, UNSPECIFIED: ICD-10-CM

## 2022-09-10 DIAGNOSIS — I50.9 HEART FAILURE, UNSPECIFIED: ICD-10-CM

## 2022-09-10 LAB
ANION GAP SERPL CALC-SCNC: 11 MMOL/L — SIGNIFICANT CHANGE UP (ref 5–17)
BUN SERPL-MCNC: 9 MG/DL — SIGNIFICANT CHANGE UP (ref 7–18)
CALCIUM SERPL-MCNC: 7.6 MG/DL — LOW (ref 8.4–10.5)
CHLORIDE SERPL-SCNC: 104 MMOL/L — SIGNIFICANT CHANGE UP (ref 96–108)
CO2 SERPL-SCNC: 21 MMOL/L — LOW (ref 22–31)
CREAT SERPL-MCNC: 0.97 MG/DL — SIGNIFICANT CHANGE UP (ref 0.5–1.3)
CULTURE RESULTS: SIGNIFICANT CHANGE UP
EGFR: 61 ML/MIN/1.73M2 — SIGNIFICANT CHANGE UP
GLUCOSE BLDC GLUCOMTR-MCNC: 208 MG/DL — HIGH (ref 70–99)
GLUCOSE BLDC GLUCOMTR-MCNC: 208 MG/DL — HIGH (ref 70–99)
GLUCOSE BLDC GLUCOMTR-MCNC: 238 MG/DL — HIGH (ref 70–99)
GLUCOSE BLDC GLUCOMTR-MCNC: 262 MG/DL — HIGH (ref 70–99)
GLUCOSE SERPL-MCNC: 193 MG/DL — HIGH (ref 70–99)
HCT VFR BLD CALC: 32.4 % — LOW (ref 34.5–45)
HGB BLD-MCNC: 11.5 G/DL — SIGNIFICANT CHANGE UP (ref 11.5–15.5)
MAGNESIUM SERPL-MCNC: 1.8 MG/DL — SIGNIFICANT CHANGE UP (ref 1.6–2.6)
MCHC RBC-ENTMCNC: 30 PG — SIGNIFICANT CHANGE UP (ref 27–34)
MCHC RBC-ENTMCNC: 35.5 GM/DL — SIGNIFICANT CHANGE UP (ref 32–36)
MCV RBC AUTO: 84.6 FL — SIGNIFICANT CHANGE UP (ref 80–100)
NRBC # BLD: 0 /100 WBCS — SIGNIFICANT CHANGE UP (ref 0–0)
PHOSPHATE SERPL-MCNC: 1.5 MG/DL — LOW (ref 2.5–4.5)
PLATELET # BLD AUTO: 315 K/UL — SIGNIFICANT CHANGE UP (ref 150–400)
POTASSIUM SERPL-MCNC: 3.5 MMOL/L — SIGNIFICANT CHANGE UP (ref 3.5–5.3)
POTASSIUM SERPL-SCNC: 3.5 MMOL/L — SIGNIFICANT CHANGE UP (ref 3.5–5.3)
RBC # BLD: 3.83 M/UL — SIGNIFICANT CHANGE UP (ref 3.8–5.2)
RBC # FLD: 14 % — SIGNIFICANT CHANGE UP (ref 10.3–14.5)
SODIUM SERPL-SCNC: 136 MMOL/L — SIGNIFICANT CHANGE UP (ref 135–145)
SPECIMEN SOURCE: SIGNIFICANT CHANGE UP
WBC # BLD: 13.59 K/UL — HIGH (ref 3.8–10.5)
WBC # FLD AUTO: 13.59 K/UL — HIGH (ref 3.8–10.5)

## 2022-09-10 RX ORDER — METOPROLOL TARTRATE 50 MG
12.5 TABLET ORAL
Refills: 0 | Status: DISCONTINUED | OUTPATIENT
Start: 2022-09-10 | End: 2022-09-15

## 2022-09-10 RX ADMIN — HEPARIN SODIUM 5000 UNIT(S): 5000 INJECTION INTRAVENOUS; SUBCUTANEOUS at 21:32

## 2022-09-10 RX ADMIN — Medication 2: at 08:04

## 2022-09-10 RX ADMIN — Medication 12.5 MILLIGRAM(S): at 10:57

## 2022-09-10 RX ADMIN — Medication 75 MICROGRAM(S): at 05:10

## 2022-09-10 RX ADMIN — Medication 650 MILLIGRAM(S): at 19:31

## 2022-09-10 RX ADMIN — MEROPENEM 100 MILLIGRAM(S): 1 INJECTION INTRAVENOUS at 21:32

## 2022-09-10 RX ADMIN — Medication 650 MILLIGRAM(S): at 20:33

## 2022-09-10 RX ADMIN — Medication 2: at 16:45

## 2022-09-10 RX ADMIN — MEROPENEM 100 MILLIGRAM(S): 1 INJECTION INTRAVENOUS at 13:55

## 2022-09-10 RX ADMIN — Medication 1 TABLET(S): at 05:10

## 2022-09-10 RX ADMIN — Medication 650 MILLIGRAM(S): at 12:00

## 2022-09-10 RX ADMIN — MEROPENEM 100 MILLIGRAM(S): 1 INJECTION INTRAVENOUS at 06:00

## 2022-09-10 RX ADMIN — Medication 650 MILLIGRAM(S): at 11:00

## 2022-09-10 RX ADMIN — PANTOPRAZOLE SODIUM 40 MILLIGRAM(S): 20 TABLET, DELAYED RELEASE ORAL at 05:59

## 2022-09-10 RX ADMIN — Medication 12.5 MILLIGRAM(S): at 17:01

## 2022-09-10 RX ADMIN — Medication 1 TABLET(S): at 13:55

## 2022-09-10 RX ADMIN — Medication 2: at 12:03

## 2022-09-10 RX ADMIN — Medication 1 TABLET(S): at 22:02

## 2022-09-10 RX ADMIN — HEPARIN SODIUM 5000 UNIT(S): 5000 INJECTION INTRAVENOUS; SUBCUTANEOUS at 13:55

## 2022-09-10 RX ADMIN — Medication 1: at 21:32

## 2022-09-10 RX ADMIN — HEPARIN SODIUM 5000 UNIT(S): 5000 INJECTION INTRAVENOUS; SUBCUTANEOUS at 05:10

## 2022-09-10 NOTE — PHYSICAL THERAPY INITIAL EVALUATION ADULT - DIAGNOSIS, PT EVAL
difficulty with bed mobility, transfers and ambulation due to generalized weakness and impaired balance

## 2022-09-10 NOTE — PROGRESS NOTE ADULT - SUBJECTIVE AND OBJECTIVE BOX
CHIEF COMPLAINT:Patient is a 77y old  Female who presents with a chief complaint of sepsis 2/2 UTI.Pt appears comfortable.    	  REVIEW OF SYSTEMS:  CONSTITUTIONAL: No fever, weight loss, or fatigue  EYES: No eye pain, visual disturbances, or discharge  ENT:  No difficulty hearing, tinnitus, vertigo; No sinus or throat pain  NECK: No pain or stiffness  RESPIRATORY: No cough, wheezing, chills or hemoptysis; No Shortness of Breath  CARDIOVASCULAR: No chest pain, palpitations, passing out, dizziness, or leg swelling  GASTROINTESTINAL: No abdominal or epigastric pain. No nausea, vomiting, or hematemesis; No diarrhea or constipation. No melena or hematochezia.  GENITOURINARY: No dysuria, frequency, hematuria, or incontinence  NEUROLOGICAL: No headaches, memory loss, loss of strength, numbness, or tremors  SKIN: No itching, burning, rashes, or lesions   LYMPH Nodes: No enlarged glands  ENDOCRINE: No heat or cold intolerance; No hair loss  MUSCULOSKELETAL: No joint pain or swelling; No muscle, back, or extremity pain  PSYCHIATRIC: No depression, anxiety, mood swings, or difficulty sleeping  HEME/LYMPH: No easy bruising, or bleeding gums  ALLERGY AND IMMUNOLOGIC: No hives or eczema	        PHYSICAL EXAM:  T(C): 37.2 (09-10-22 @ 07:52), Max: 37.7 (09-09-22 @ 19:56)  HR: 100 (09-10-22 @ 07:52) (93 - 108)  BP: 145/65 (09-10-22 @ 07:52) (134/65 - 149/72)  RR: 19 (09-10-22 @ 07:52) (18 - 19)  SpO2: 98% (09-10-22 @ 07:52) (94% - 99%)  Wt(kg): --  I&O's Summary      Appearance: Normal	  HEENT:   Normal oral mucosa, PERRL, EOMI	  Lymphatic: No lymphadenopathy  Cardiovascular: Normal S1 S2, No JVD, No murmurs, No edema  Respiratory: Lungs clear to auscultation	  Psychiatry: A & O x 3, Mood & affect appropriate  Gastrointestinal:  Soft, Non-tender, + BS	  Skin: No rashes, No ecchymoses, No cyanosis	  Neurologic: Non-focal  Extremities: Normal range of motion, No clubbing, cyanosis or edema  Vascular: Peripheral pulses palpable 2+ bilaterally    MEDICATIONS  (STANDING):  dextrose 5%. 1000 milliLiter(s) (50 mL/Hr) IV Continuous <Continuous>  dextrose 5%. 1000 milliLiter(s) (100 mL/Hr) IV Continuous <Continuous>  dextrose 50% Injectable 25 Gram(s) IV Push once  dextrose 50% Injectable 12.5 Gram(s) IV Push once  dextrose 50% Injectable 25 Gram(s) IV Push once  glucagon  Injectable 1 milliGRAM(s) IntraMuscular once  heparin   Injectable 5000 Unit(s) SubCutaneous every 8 hours  insulin lispro (ADMELOG) corrective regimen sliding scale   SubCutaneous three times a day before meals  insulin lispro (ADMELOG) corrective regimen sliding scale   SubCutaneous at bedtime  lactobacillus acidophilus 1 Tablet(s) Oral three times a day  levothyroxine 75 MICROGram(s) Oral daily  meropenem  IVPB      meropenem  IVPB 1000 milliGRAM(s) IV Intermittent every 8 hours  pantoprazole    Tablet 40 milliGRAM(s) Oral before breakfast      TELEMETRY: 	nsr,sinus tach     	  	  LABS:	 	      Troponin I, High Sensitivity Result: 12.2 ng/L (09-07 @ 16:10)                            11.5   13.59 )-----------( 315      ( 10 Sep 2022 05:03 )             32.4     09-10    136  |  104  |  9   ----------------------------<  193<H>  3.5   |  21<L>  |  0.97    Ca    7.6<L>      10 Sep 2022 05:03  Phos  1.5     09-10  Mg     1.8     09-10    TPro  6.2  /  Alb  2.1<L>  /  TBili  0.3  /  DBili  x   /  AST  20  /  ALT  19  /  AlkPhos  67  09-09    proBNP: Serum Pro-Brain Natriuretic Peptide: 903 pg/mL (09-07 @ 16:10)      TSH: Thyroid Stimulating Hormone, Serum: 2.00 uU/mL (09-09 @ 05:27)      	    Culture - Blood (09.07.22 @ 16:50)   - ESBL: Detec   - Escherichia coli: Detec   - CTX-M Resistance Marker: Detec   Gram Stain:   Growth in aerobic bottle: Gram Negative Rods   Specimen Source: .Blood Blood   Organism: Blood Culture PCR   Culture Results:   Growth in aerobic bottle: Gram Negative Rods   ***Blood Panel PCR results on this specimen are available   approximately 3 hours after the Gram stain result.***   Gram stain, PCR, and/or culture results may not always   correspond due to difference in methodologies. GI PCR Panel Stool (09.08.22 @ 19:20)   GI PCR Panel: Detected: GI Panel PCR evaluates for:   Campylobacter, Plesiomonas shigelloides, Salmonella, Vibrio, Yersinia   enterocolitica, Enteroaggregative Escherichia (EAEC), Enteropathogenic E.   coli (EPEC), Enterotoxigenic E. coli (ETEC), Shiga-like toxin producing   E.coli (STEC), E. coli O157, Shigella/Enteroinvasive E. coli (EIEC),   Adenovirus, Astrovirus, Norovirus, Rotavirus, Sapovirus, Cryptosporidium,   Cyclospora cayetanensis, Entamoeba histolytica, Giardia lamblia.   For culture and susceptibility reports refer to “reflex stool culture”.   Shiga-like toxin-producing E.coli (STEC) stx1/stx2: Detected     < from: Transthoracic Echocardiogram (09.08.22 @ 07:02) >  OBSERVATIONS:  Mitral Valve: Normal mitral valve.  Aortic Root: Aortic Root: 2.7 cm.    Aortic Valve: Normal trileaflet aortic valve.  Left Atrium: Normal left atrium.  LA volume index = 18  cc/m2.  Left Ventricle: Hyperdynamic left ventricular systolic  function (EF >70%). Normal left ventricular internal  dimensions and wall thicknesses. Grade I diastolic  dysfunction (Impaired relaxation, mild).  Right Heart: Normal right atrium. Normal right ventricular  size and systolic function (TAPSE  2.3cm). Normal tricuspid  valve. Normal pulmonic valve.  Pericardium/PleuraTrivial pericardial effusion is seen.  Hemodynamic: RA Pressure is 8 mm Hg. RV systolic pressure  is mildly increased at  44 mm Hg.    < end of copied text >

## 2022-09-10 NOTE — PROGRESS NOTE ADULT - SUBJECTIVE AND OBJECTIVE BOX
Patient is a 77y old  Female who presents with a chief complaint of sepsis 2/2 UTI (09 Sep 2022 17:07)    pt seen in tele [ x ], reg med floor [   ], bed [ x ], chair at bedside [   ], a+o x3 [ x ], lethargic [  ],    nad [ x ]      Allergies    No Known Allergies        Vitals    T(F): 97.9 (09-10-22 @ 05:23), Max: 99.9 (09-09-22 @ 19:56)  HR: 98 (09-10-22 @ 05:23) (93 - 108)  BP: 149/72 (09-10-22 @ 05:23) (134/65 - 149/72)  RR: 18 (09-10-22 @ 05:23) (18 - 18)  SpO2: 99% (09-10-22 @ 05:23) (94% - 99%)  Wt(kg): --  CAPILLARY BLOOD GLUCOSE      POCT Blood Glucose.: 195 mg/dL (09 Sep 2022 21:08)      Labs                          11.5   13.59 )-----------( 315      ( 10 Sep 2022 05:03 )             32.4       09-10    136  |  104  |  9   ----------------------------<  193<H>  3.5   |  21<L>  |  0.97    Ca    7.6<L>      10 Sep 2022 05:03  Phos  1.5     09-10  Mg     1.8     09-10    TPro  6.2  /  Alb  2.1<L>  /  TBili  0.3  /  DBili  x   /  AST  20  /  ALT  19  /  AlkPhos  67  09-09          Troponin I, High Sensitivity Result: 12.2 ng/L (09-07-22 @ 16:10)    .Stool Other, stool  09-08 @ 14:20   Testing in progress  --  --      .Blood Blood  09-07 @ 16:57   No growth to date.  --  --      .Blood Blood  09-07 @ 16:50   Growth in aerobic bottle: Gram Negative Rods  ***Blood Panel PCR results on this specimen are available  approximately 3 hours after the Gram stain result.***  Gram stain, PCR, and/or culture results may not always  correspond due to difference in methodologies.  ************************************************************  This PCR assay was performed by multiplex PCR. This  Assay tests for 66 bacterial and resistance gene targets.  Please refer to the [facility] test directory  at [email or web address] for details.  --  Blood Culture PCR          Radiology Results      Meds    MEDICATIONS  (STANDING):  dextrose 5%. 1000 milliLiter(s) (50 mL/Hr) IV Continuous <Continuous>  dextrose 5%. 1000 milliLiter(s) (100 mL/Hr) IV Continuous <Continuous>  dextrose 50% Injectable 25 Gram(s) IV Push once  dextrose 50% Injectable 12.5 Gram(s) IV Push once  dextrose 50% Injectable 25 Gram(s) IV Push once  glucagon  Injectable 1 milliGRAM(s) IntraMuscular once  heparin   Injectable 5000 Unit(s) SubCutaneous every 8 hours  insulin lispro (ADMELOG) corrective regimen sliding scale   SubCutaneous three times a day before meals  insulin lispro (ADMELOG) corrective regimen sliding scale   SubCutaneous at bedtime  lactobacillus acidophilus 1 Tablet(s) Oral three times a day  levothyroxine 75 MICROGram(s) Oral daily  meropenem  IVPB      meropenem  IVPB 1000 milliGRAM(s) IV Intermittent every 8 hours  pantoprazole    Tablet 40 milliGRAM(s) Oral before breakfast      MEDICATIONS  (PRN):  acetaminophen     Tablet .. 650 milliGRAM(s) Oral every 6 hours PRN Temp greater or equal to 38C (100.4F), Mild Pain (1 - 3)  dextrose Oral Gel 15 Gram(s) Oral once PRN Blood Glucose LESS THAN 70 milliGRAM(s)/deciliter  ondansetron Injectable 4 milliGRAM(s) IV Push every 8 hours PRN Nausea and/or Vomiting      Physical Exam    Neuro :  no focal deficits  Respiratory: CTA B/L  CV: RRR, S1S2, no murmurs,   Abdominal: Soft, NT, ND +BS,  Extremities: No edema, + peripheral pulses    ASSESSMENT    sepsis,   uti,   electrolyte imbalance,   joseph,   syncope,   s/p fall,   nasal fx,   diarrhea,   r/o c diff,   h/o HTN,   DM,   HLD,   Hypothyroidism,   s/p cholecystectomy,   s/p hysterectomy  Depression      PLAN      cont tele,   cont bacid tid,   blood cx neg noted above  f/u ucx   id f/u   IR and Surgery evaluation for Right Inguinal cystic structure with surrounding edema may represent  abscess or infected plug   Follow up stool studies  Continue Ceftriaxone 2g daily and Add Flagyl until work up is done  ct abd pelv with Evaluation of the solid organs, vascular structures and GI tract is limited without oral and IV contrast.  Perinephric edema suggestive of bilateral pyelonephritis. Small ascites and small bilateral pleural effusions.  Right inguinal cystic structure with surrounding edema may represent abscess or infected plug from prior hernia repair. If DVT is suspected,   recommend ultrasound Doppler evaluation noted above.  doppler le b/l with No evidence of deep venous thrombosis in either lower extremity noted above.  surg f/u   Rt groin mass likely enlarged inguinal Lymph Node, reactive due to undergoing infectious process.   No evidence of abscess.   Recommend follow up US after completion of abx course   cardio f/u   f/u echo   f/u orthostatic bp  cont ivf  supplement potassium and mg as needed   stool stool studies with Shiga-like toxin-producing E.coli (STEC) stx1/stx2: Detected noted above  f/u hgba1c   lispro ss,   phys tx eval   cont current meds       Patient is a 77y old  Female who presents with a chief complaint of sepsis 2/2 UTI (09 Sep 2022 17:07)    pt seen in tele [ x ], reg med floor [   ], bed [ x ], chair at bedside [   ], a+o x3 [ x ], lethargic [  ],    nad [ x ]      Allergies    No Known Allergies        Vitals    T(F): 97.9 (09-10-22 @ 05:23), Max: 99.9 (09-09-22 @ 19:56)  HR: 98 (09-10-22 @ 05:23) (93 - 108)  BP: 149/72 (09-10-22 @ 05:23) (134/65 - 149/72)  RR: 18 (09-10-22 @ 05:23) (18 - 18)  SpO2: 99% (09-10-22 @ 05:23) (94% - 99%)  Wt(kg): --  CAPILLARY BLOOD GLUCOSE      POCT Blood Glucose.: 195 mg/dL (09 Sep 2022 21:08)      Labs                          11.5   13.59 )-----------( 315      ( 10 Sep 2022 05:03 )             32.4       09-10    136  |  104  |  9   ----------------------------<  193<H>  3.5   |  21<L>  |  0.97    Ca    7.6<L>      10 Sep 2022 05:03  Phos  1.5     09-10  Mg     1.8     09-10    TPro  6.2  /  Alb  2.1<L>  /  TBili  0.3  /  DBili  x   /  AST  20  /  ALT  19  /  AlkPhos  67  09-09          Troponin I, High Sensitivity Result: 12.2 ng/L (09-07-22 @ 16:10)    .Stool Other, stool  09-08 @ 14:20   Testing in progress  --  --      .Blood Blood  09-07 @ 16:57   No growth to date.  --  --      .Blood Blood  09-07 @ 16:50   Growth in aerobic bottle: Gram Negative Rods  ***Blood Panel PCR results on this specimen are available  approximately 3 hours after the Gram stain result.***  Gram stain, PCR, and/or culture results may not always  correspond due to difference in methodologies.  ************************************************************  This PCR assay was performed by multiplex PCR. This  Assay tests for 66 bacterial and resistance gene targets.  Please refer to the [facility] test directory  at [email or web address] for details.  --  Blood Culture PCR  - ESBL: Detec   - Escherichia coli: Detec   - CTX-M Resistance Marker: Detec      < from: Transthoracic Echocardiogram (09.08.22 @ 07:02) >  CONCLUSIONS:  1. Normal mitral valve.  2. Normal trileaflet aortic valve.  3. Aortic Root: 2.7 cm.    4. Normal left atrium.  LA volume index = 18 cc/m2.  5. Normal left ventricular internal dimensions and wall  thicknesses.  6. Hyperdynamic left ventricular systolic function (EF  >70%).  7. Grade I diastolic dysfunction (Impaired relaxation,  mild).  8. Normal right atrium.  9. Normal right ventricular size and systolic function  (TAPSE  2.3cm).  10. RA Pressure is 8 mm Hg.  11. RV systolic pressure is mildly increased at  44 mm Hg.  12. Normal tricuspid valve.  13. Normal pulmonic valve.  14. Trivial pericardial effusion is seen.      < end of copied text >        Radiology Results      Meds    MEDICATIONS  (STANDING):  dextrose 5%. 1000 milliLiter(s) (50 mL/Hr) IV Continuous <Continuous>  dextrose 5%. 1000 milliLiter(s) (100 mL/Hr) IV Continuous <Continuous>  dextrose 50% Injectable 25 Gram(s) IV Push once  dextrose 50% Injectable 12.5 Gram(s) IV Push once  dextrose 50% Injectable 25 Gram(s) IV Push once  glucagon  Injectable 1 milliGRAM(s) IntraMuscular once  heparin   Injectable 5000 Unit(s) SubCutaneous every 8 hours  insulin lispro (ADMELOG) corrective regimen sliding scale   SubCutaneous three times a day before meals  insulin lispro (ADMELOG) corrective regimen sliding scale   SubCutaneous at bedtime  lactobacillus acidophilus 1 Tablet(s) Oral three times a day  levothyroxine 75 MICROGram(s) Oral daily  meropenem  IVPB      meropenem  IVPB 1000 milliGRAM(s) IV Intermittent every 8 hours  pantoprazole    Tablet 40 milliGRAM(s) Oral before breakfast      MEDICATIONS  (PRN):  acetaminophen     Tablet .. 650 milliGRAM(s) Oral every 6 hours PRN Temp greater or equal to 38C (100.4F), Mild Pain (1 - 3)  dextrose Oral Gel 15 Gram(s) Oral once PRN Blood Glucose LESS THAN 70 milliGRAM(s)/deciliter  ondansetron Injectable 4 milliGRAM(s) IV Push every 8 hours PRN Nausea and/or Vomiting      Physical Exam    Neuro :  no focal deficits  Respiratory: CTA B/L  CV: RRR, S1S2, no murmurs,   Abdominal: Soft, NT, ND +BS,  Extremities: No edema, + peripheral pulses    ASSESSMENT    sepsis,   esbl e coli bacteremia  uti,   electrolyte imbalance,   joseph,   syncope,   s/p fall,   nasal fx,   diarrhea,   c diff r/o,   h/o HTN,   DM,   HLD,   Hypothyroidism,   s/p cholecystectomy,   s/p hysterectomy  Depression      PLAN      cont tele,   cont bacid tid,   blood cx with esbl e coli noted above   rept blood cx   f/u ucx   id f/u   stool stool studies with Shiga-like toxin-producing E.coli (STEC) stx1/stx2: Detected noted above  abx changed to meropenem  ct abd pelv with Evaluation of the solid organs, vascular structures and GI tract is limited without oral and IV contrast.  Perinephric edema suggestive of bilateral pyelonephritis. Small ascites and small bilateral pleural effusions.  Right inguinal cystic structure with surrounding edema may represent abscess or infected plug from prior hernia repair. If DVT is suspected,   recommend ultrasound Doppler evaluation noted    doppler le b/l with No evidence of deep venous thrombosis in either lower extremity noted    surg f/u   Rt groin mass likely enlarged inguinal Lymph Node, reactive due to undergoing infectious process.   No evidence of abscess.   Recommend follow up US after completion of abx course   cardio f/u   echo with EF >70%. Grade I diastolic dysfunction. RV systolic pressure is mildly increased. Trivial pericardial effusion is seen noted above.  d/c ivf   serum creat wnl   supplement phos  pulm cons for pulm htn  f/u hgba1c   lispro ss,   phys tx eval    cont current meds

## 2022-09-10 NOTE — PROGRESS NOTE ADULT - ASSESSMENT
76 y/o F from home w/ PMHx of HTN, DM, HLD, Hypothyroidism, s/p cholecystectomy, s/p hysterectomy. She had been having generalized weakness, dizziness and dysuria for the past 3 days,diarrhea,CALLY,UTI,abnormal EKG,E coli bacteremia.  1.Tele monitoring.  2.Diarrhea-E coli bacteremia-ABX.  3.CALLY-resolved  4.Check repeat blood cx.  5.Hypothyroidism-synthroid.  6.DM-Insulin.  7.GI and DVT prophylaxis.

## 2022-09-10 NOTE — PHYSICAL THERAPY INITIAL EVALUATION ADULT - LEVEL OF INDEPENDENCE, REHAB EVAL
[FreeTextEntry1] : The patient is a 68-year-old G2, P2 postmenopausal white female.  She had her first child at age 33.  She never took any hormone replacement therapy after menopause.  She has no family history of breast or ovarian cancer.  The patient was initially diagnosed with a left breast cancer in 2005 and underwent a left breast lower outer quadrant partial mastectomy on March 22, 2007 for 3.5 mm moderately differentiated invasive duct cancer which was ER/UT positive HER-2/benny negative.  [place] node performed at that time was negative.  This was a pathologic prognostic stage IA breast cancer.  I attempted a MammoSite balloon catheter but had to abort the procedure and she had hypofractionated radiation down at [facility] through Dr. Berkowitz.  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  She did not receive any further radiation.  She did undergo comprehensive BRCA testing in November 2011 which was negative.  She did not receive any further radiation and was put on tamoxifen by Dr. Hugh Patel.  She then underwent a left breast stereotactic core biopsy for calcifications on June 27, 2018 showing fat necrosis and stromal fibrosis.  She developed some left breast erythema as well as fever and chills and was seen by Dr. Johnson the December 2019 and placed on antibiotics and also had a punch biopsy of the skin which showed chronic dermal inflammation.  The redness resolved spontaneously.  On exam today, the patient has no evidence of recurrence in the left breast and no suspicious findings in the right.  She underwent her last bilateral mammography and ultrasound which was reviewed from June 21, 2022 and performed in [facility] radiology which showed no suspicious findings and postop changes in the left breast.  She should follow-up again in 1 year and her next bilateral mammography and ultrasound will be due at that time in June 2023 and she was given prescriptions.  She stopped tamoxifen after 10 years of treatment in 2021. supervision

## 2022-09-10 NOTE — PHYSICAL THERAPY INITIAL EVALUATION ADULT - GENERAL OBSERVATIONS, REHAB EVAL
Pt seen supine in bed w/telemetry, (+) left facial ecchymoses,  #[id number] assisted with translation. Pt is cooperative during assessment

## 2022-09-10 NOTE — PHYSICAL THERAPY INITIAL EVALUATION ADULT - CRITERIA FOR SKILLED THERAPEUTIC INTERVENTIONS
home with home PT and appropriate social support/impairments found/functional limitations in following categories/anticipated discharge recommendation

## 2022-09-10 NOTE — CONSULT NOTE ADULT - SUBJECTIVE AND OBJECTIVE BOX
PULMONARY CONSULT NOTE      JUDY CAMARGO  MRN-[id number]    History of Present Illness:  Reason for Admission: sepsis 2/2 UTI  History of Present Illness:   Patient is a 76 y/o F from home w/ PMHx of HTN, DM, HLD, Hypothyroidism, s/p cholecystectomy, s/p hysterectomy. She had been having generalized weakness, dizziness and dysuria for the past 3 days. In the morning of admission, she was in the bathroom for an episode of non-foul smelling watery diarrhea with associated lower abdominal pain. She was having another episode of dizziness causing her to fall and hit her face. She developed abrasions on the face near the bridge of the nose. She also had an episode of nonbloody, non-bilous vomiting. She denies any headache, blurring of vision, chest pain, shortness of breath, palpitation. In the ED, her VS were stable. She was noted to have an elevated WBC of 21.09, Lactate of 3.2, Cr of 2.09, low Na of 130, K of 3.2, Mg of 1.0. CT Head was negative. CT Maxillofacial sinus is negative showing slight L nasal displacement. She was given 3L bolus of NS and a dose of Rocephin and Azithromycin. Of note, she is also taking multiple antibiotics including Bactrim and Levaquin for UTI, Metronidazole for diarrhea and Fluconazole for urinary candidiasis.      HISTORY OF PRESENT ILLNESS: As Above. Awake, alert, comfortable in bed in NAD. Former heavy smoker    MEDICATIONS  (STANDING):  dextrose 5%. 1000 milliLiter(s) (50 mL/Hr) IV Continuous <Continuous>  dextrose 5%. 1000 milliLiter(s) (100 mL/Hr) IV Continuous <Continuous>  dextrose 50% Injectable 25 Gram(s) IV Push once  dextrose 50% Injectable 12.5 Gram(s) IV Push once  dextrose 50% Injectable 25 Gram(s) IV Push once  glucagon  Injectable 1 milliGRAM(s) IntraMuscular once  heparin   Injectable 5000 Unit(s) SubCutaneous every 8 hours  insulin lispro (ADMELOG) corrective regimen sliding scale   SubCutaneous three times a day before meals  insulin lispro (ADMELOG) corrective regimen sliding scale   SubCutaneous at bedtime  lactobacillus acidophilus 1 Tablet(s) Oral three times a day  levothyroxine 75 MICROGram(s) Oral daily  meropenem  IVPB      meropenem  IVPB 1000 milliGRAM(s) IV Intermittent every 8 hours  pantoprazole    Tablet 40 milliGRAM(s) Oral before breakfast      MEDICATIONS  (PRN):  acetaminophen     Tablet .. 650 milliGRAM(s) Oral every 6 hours PRN Temp greater or equal to 38C (100.4F), Mild Pain (1 - 3)  dextrose Oral Gel 15 Gram(s) Oral once PRN Blood Glucose LESS THAN 70 milliGRAM(s)/deciliter  ondansetron Injectable 4 milliGRAM(s) IV Push every 8 hours PRN Nausea and/or Vomiting      Allergies    No Known Allergies    Intolerances        PAST MEDICAL & SURGICAL HISTORY:  Hypertension      Diabetes      Kidney stones      Depression      Hypothyroidism      HLD (hyperlipidemia)      S/P hysterectomy      S/P cholecystectomy          FAMILY HISTORY:      SOCIAL HISTORY  Smoking History:     REVIEW OF SYSTEMS:    CONSTITUTIONAL:  No fevers, chills, sweats    HEENT:  Eyes:  No diplopia or blurred vision. ENT:  No earache, sore throat or runny nose.    CARDIOVASCULAR:  No pressure, squeezing, tightness, or heaviness about the chest; no palpitations.    RESPIRATORY:  Per HPI    GASTROINTESTINAL:  No abdominal pain, nausea, vomiting or diarrhea.    GENITOURINARY:  No dysuria, frequency or urgency.    NEUROLOGIC:  No paresthesias, fasciculations, seizures or weakness.    PSYCHIATRIC:  No disorder of thought or mood.    Vital Signs Last 24 Hrs  T(C): 37.2 (10 Sep 2022 07:52), Max: 37.7 (09 Sep 2022 19:56)  T(F): 99 (10 Sep 2022 07:52), Max: 99.9 (09 Sep 2022 19:56)  HR: 100 (10 Sep 2022 07:52) (93 - 108)  BP: 145/65 (10 Sep 2022 07:52) (134/65 - 149/72)  BP(mean): --  RR: 19 (10 Sep 2022 07:52) (18 - 19)  SpO2: 98% (10 Sep 2022 07:52) (94% - 99%)    Parameters below as of 10 Sep 2022 07:52  Patient On (Oxygen Delivery Method): room air      I&O's Detail      PHYSICAL EXAMINATION:    GENERAL: The patient is a well-developed, well-nourished _____in no apparent distress.     HEENT: Head is normocephalic and atraumatic. Extraocular muscles are intact. Mucous membranes are moist.     NECK: Supple.     LUNGS: Clear to auscultation without wheezing, rales, or rhonchi. Respirations unlabored    HEART: Regular rate and rhythm without murmur.    ABDOMEN: Soft, nontender, and nondistended.  No hepatosplenomegaly is noted.    EXTREMITIES: Without any cyanosis, clubbing, rash, lesions or edema.    NEUROLOGIC: Grossly intact.      LABS:                        11.5   13.59 )-----------( 315      ( 10 Sep 2022 05:03 )             32.4     09-10    136  |  104  |  9   ----------------------------<  193<H>  3.5   |  21<L>  |  0.97    Ca    7.6<L>      10 Sep 2022 05:03  Phos  1.5     09-10  Mg     1.8     09-10    TPro  6.2  /  Alb  2.1<L>  /  TBili  0.3  /  DBili  x   /  AST  20  /  ALT  19  /  AlkPhos  67  09-09                Serum Pro-Brain Natriuretic Peptide: 903 pg/mL (09-07-22 @ 16:10)          MICROBIOLOGY:    RADIOLOGY & ADDITIONAL STUDIES:    CXR:  < from: Xray Chest 1 View- PORTABLE-Urgent (09.07.22 @ 17:02) >  IMPRESSION: No acute finding or change.    < end of copied text >  < from: CT Abdomen and Pelvis No Cont (09.08.22 @ 09:14) >  FINDINGS:  LOWER CHEST: Trace bilateral pleural effusionswith underlying passive   atelectasis, greater on the left. Trace pericardial effusion.    < end of copied text >    Ct scan chest;    ekg;    echo:< from: Transthoracic Echocardiogram (09.08.22 @ 07:02) >  ------------------------------------------------------------------------  CONCLUSIONS:  1. Normal mitral valve.  2. Normal trileaflet aortic valve.  3. Aortic Root: 2.7 cm.    4. Normal left atrium.  LA volume index = 18 cc/m2.  5. Normal left ventricular internal dimensions and wall  thicknesses.  6. Hyperdynamic left ventricular systolic function (EF  >70%).  7. Grade I diastolic dysfunction (Impaired relaxation,  mild).  8. Normal right atrium.  9. Normal right ventricular size and systolic function  (TAPSE  2.3cm).  10. RA Pressure is 8 mm Hg.  11. RV systolic pressure is mildly increased at  44 mm Hg.  12. Normal tricuspid valve.  13. Normal pulmonic valve.  14. Trivial pericardial effusion is seen.      < end of copied text >

## 2022-09-10 NOTE — PHYSICAL THERAPY INITIAL EVALUATION ADULT - LEVEL OF INDEPENDENCE: GAIT, REHAB EVAL
"NAVIGATE SEE Incoming Telephone Call  For Supported Employment & Education    NAVIGATE Enrollee: Jerel Day (1995)     MRN: [id number]  Incoming Call Received on: 7/07/20  Call Received from: Jerel  Call Length: 32 minutes    SEE's response to incoming call:     Jerel called this writer and discussed his new apartment and neighborhood. Jerel reports at times he will walk around the [facility] and will get lost. He said today he walked this morning and didn't get lost, which he said he felt good about. Jerel was at times difficult to understand but shared his frustrations regarding family stressors, finances, getting a new  and taking medications. Jerel said he has been halving or quartering his medications so this writer strongly encouraged him to schedule an appt with Dr. Yusuf. Jerel said he has \"been scrambled\" lately so he has missed appts with his IRT, SHIRA Morgan. Writer reminded him of his upcoming appt on Thursday and also offered to text Jerel to remind him. He was thankful for this writer and said he is \"trying to get a handle on the technology stuff with my new phone\". Wanted to talk via phone again with this writer next week.     Will route note to NAVIGATE team members RE: medications and disorganization.    YOSEPH Raymundo  Supported Employment and   " contact guard

## 2022-09-11 LAB
-  AMIKACIN: SIGNIFICANT CHANGE UP
-  AMPICILLIN/SULBACTAM: SIGNIFICANT CHANGE UP
-  AMPICILLIN: SIGNIFICANT CHANGE UP
-  AZTREONAM: SIGNIFICANT CHANGE UP
-  CEFAZOLIN: SIGNIFICANT CHANGE UP
-  CEFEPIME: SIGNIFICANT CHANGE UP
-  CEFTRIAXONE: SIGNIFICANT CHANGE UP
-  CIPROFLOXACIN: SIGNIFICANT CHANGE UP
-  ERTAPENEM: SIGNIFICANT CHANGE UP
-  GENTAMICIN: SIGNIFICANT CHANGE UP
-  IMIPENEM: SIGNIFICANT CHANGE UP
-  LEVOFLOXACIN: SIGNIFICANT CHANGE UP
-  MEROPENEM: SIGNIFICANT CHANGE UP
-  PIPERACILLIN/TAZOBACTAM: SIGNIFICANT CHANGE UP
-  TOBRAMYCIN: SIGNIFICANT CHANGE UP
-  TRIMETHOPRIM/SULFAMETHOXAZOLE: SIGNIFICANT CHANGE UP
CULTURE RESULTS: SIGNIFICANT CHANGE UP
GLUCOSE BLDC GLUCOMTR-MCNC: 201 MG/DL — HIGH (ref 70–99)
GLUCOSE BLDC GLUCOMTR-MCNC: 223 MG/DL — HIGH (ref 70–99)
GLUCOSE BLDC GLUCOMTR-MCNC: 233 MG/DL — HIGH (ref 70–99)
GLUCOSE BLDC GLUCOMTR-MCNC: 275 MG/DL — HIGH (ref 70–99)
GLUCOSE BLDC GLUCOMTR-MCNC: 305 MG/DL — HIGH (ref 70–99)
METHOD TYPE: SIGNIFICANT CHANGE UP
ORGANISM # SPEC MICROSCOPIC CNT: SIGNIFICANT CHANGE UP
SPECIMEN SOURCE: SIGNIFICANT CHANGE UP

## 2022-09-11 RX ORDER — AZITHROMYCIN 500 MG/1
500 TABLET, FILM COATED ORAL ONCE
Refills: 0 | Status: COMPLETED | OUTPATIENT
Start: 2022-09-11 | End: 2022-09-11

## 2022-09-11 RX ORDER — AZITHROMYCIN 500 MG/1
TABLET, FILM COATED ORAL
Refills: 0 | Status: DISCONTINUED | OUTPATIENT
Start: 2022-09-11 | End: 2022-09-14

## 2022-09-11 RX ORDER — AZITHROMYCIN 500 MG/1
500 TABLET, FILM COATED ORAL EVERY 24 HOURS
Refills: 0 | Status: DISCONTINUED | OUTPATIENT
Start: 2022-09-12 | End: 2022-09-14

## 2022-09-11 RX ORDER — ERTAPENEM SODIUM 1 G/1
1000 INJECTION, POWDER, LYOPHILIZED, FOR SOLUTION INTRAMUSCULAR; INTRAVENOUS EVERY 24 HOURS
Refills: 0 | Status: DISCONTINUED | OUTPATIENT
Start: 2022-09-11 | End: 2022-09-15

## 2022-09-11 RX ADMIN — MEROPENEM 100 MILLIGRAM(S): 1 INJECTION INTRAVENOUS at 13:52

## 2022-09-11 RX ADMIN — Medication 12.5 MILLIGRAM(S): at 17:16

## 2022-09-11 RX ADMIN — Medication 1 TABLET(S): at 13:52

## 2022-09-11 RX ADMIN — Medication 2: at 16:44

## 2022-09-11 RX ADMIN — Medication 12.5 MILLIGRAM(S): at 05:44

## 2022-09-11 RX ADMIN — PANTOPRAZOLE SODIUM 40 MILLIGRAM(S): 20 TABLET, DELAYED RELEASE ORAL at 05:44

## 2022-09-11 RX ADMIN — HEPARIN SODIUM 5000 UNIT(S): 5000 INJECTION INTRAVENOUS; SUBCUTANEOUS at 05:43

## 2022-09-11 RX ADMIN — Medication 1 TABLET(S): at 21:15

## 2022-09-11 RX ADMIN — MEROPENEM 100 MILLIGRAM(S): 1 INJECTION INTRAVENOUS at 05:43

## 2022-09-11 RX ADMIN — Medication 650 MILLIGRAM(S): at 03:55

## 2022-09-11 RX ADMIN — ERTAPENEM SODIUM 120 MILLIGRAM(S): 1 INJECTION, POWDER, LYOPHILIZED, FOR SOLUTION INTRAMUSCULAR; INTRAVENOUS at 21:16

## 2022-09-11 RX ADMIN — Medication 2: at 08:15

## 2022-09-11 RX ADMIN — Medication 75 MICROGRAM(S): at 05:44

## 2022-09-11 RX ADMIN — HEPARIN SODIUM 5000 UNIT(S): 5000 INJECTION INTRAVENOUS; SUBCUTANEOUS at 13:52

## 2022-09-11 RX ADMIN — Medication 1 TABLET(S): at 05:44

## 2022-09-11 RX ADMIN — Medication 2: at 21:14

## 2022-09-11 RX ADMIN — AZITHROMYCIN 255 MILLIGRAM(S): 500 TABLET, FILM COATED ORAL at 21:15

## 2022-09-11 RX ADMIN — Medication 3: at 11:52

## 2022-09-11 RX ADMIN — HEPARIN SODIUM 5000 UNIT(S): 5000 INJECTION INTRAVENOUS; SUBCUTANEOUS at 21:15

## 2022-09-11 RX ADMIN — Medication 650 MILLIGRAM(S): at 04:47

## 2022-09-11 RX ADMIN — Medication 650 MILLIGRAM(S): at 21:15

## 2022-09-11 NOTE — PROGRESS NOTE ADULT - SUBJECTIVE AND OBJECTIVE BOX
Patient is a 77y old  Female who presents with a chief complaint of sepsis 2/2 UTI (10 Sep 2022 10:13)    pt seen in tele [ x ], reg med floor [   ], bed [ x ], chair at bedside [   ], a+o x3 [ x ], lethargic [  ],    nad [ x ]      Allergies    No Known Allergies        Vitals    T(F): 99 (09-11-22 @ 04:28), Max: 99 (09-10-22 @ 07:52)  HR: 96 (09-11-22 @ 04:28) (86 - 100)  BP: 150/82 (09-11-22 @ 04:28) (125/77 - 150/82)  RR: 18 (09-11-22 @ 04:28) (18 - 19)  SpO2: 97% (09-11-22 @ 04:28) (96% - 100%)  Wt(kg): --  CAPILLARY BLOOD GLUCOSE      POCT Blood Glucose.: 262 mg/dL (10 Sep 2022 21:02)      Labs                          11.5   13.59 )-----------( 315      ( 10 Sep 2022 05:03 )             32.4       09-10    136  |  104  |  9   ----------------------------<  193<H>  3.5   |  21<L>  |  0.97    Ca    7.6<L>      10 Sep 2022 05:03  Phos  1.5     09-10  Mg     1.8     09-10              .Stool Other, stool  09-08 @ 14:20   No Protozoa seen by trichrome stain  No Helminths or Protozoa seen in formalin concentrate  performed by iodine stain  (routine O+P not evaluated for Microsporidia,  Cryptosporidia or Cyclospora)  One negative sample does not necessarily rule  out the presence of a parasitic infection.  Moderate White blood cells  --  --      .Blood Blood  09-07 @ 16:57   No growth to date.  --  --      .Blood Blood  09-07 @ 16:50   Growth in aerobic bottle: Escherichia coli  ***Blood Panel PCR results on this specimen are available  approximately 3 hours after the Gram stain result.***  Gram stain, PCR, and/or culture results may not always  correspond due to difference in methodologies.  ************************************************************  This PCR assay was performed by multiplex PCR. This  Assay tests for 66 bacterial and resistance gene targets.  Please refer to the [facility] test directory  at [email or web address] for details.  --  Blood Culture PCR          Radiology Results      Meds    MEDICATIONS  (STANDING):  dextrose 5%. 1000 milliLiter(s) (50 mL/Hr) IV Continuous <Continuous>  dextrose 5%. 1000 milliLiter(s) (100 mL/Hr) IV Continuous <Continuous>  dextrose 50% Injectable 25 Gram(s) IV Push once  dextrose 50% Injectable 12.5 Gram(s) IV Push once  dextrose 50% Injectable 25 Gram(s) IV Push once  glucagon  Injectable 1 milliGRAM(s) IntraMuscular once  heparin   Injectable 5000 Unit(s) SubCutaneous every 8 hours  insulin lispro (ADMELOG) corrective regimen sliding scale   SubCutaneous three times a day before meals  insulin lispro (ADMELOG) corrective regimen sliding scale   SubCutaneous at bedtime  lactobacillus acidophilus 1 Tablet(s) Oral three times a day  levothyroxine 75 MICROGram(s) Oral daily  meropenem  IVPB      meropenem  IVPB 1000 milliGRAM(s) IV Intermittent every 8 hours  metoprolol tartrate 12.5 milliGRAM(s) Oral two times a day  pantoprazole    Tablet 40 milliGRAM(s) Oral before breakfast      MEDICATIONS  (PRN):  acetaminophen     Tablet .. 650 milliGRAM(s) Oral every 6 hours PRN Temp greater or equal to 38C (100.4F), Mild Pain (1 - 3)  dextrose Oral Gel 15 Gram(s) Oral once PRN Blood Glucose LESS THAN 70 milliGRAM(s)/deciliter  ondansetron Injectable 4 milliGRAM(s) IV Push every 8 hours PRN Nausea and/or Vomiting      Physical Exam  Neuro :  no focal deficits  Respiratory: CTA B/L  CV: RRR, S1S2, no murmurs,   Abdominal: Soft, NT, ND +BS,  Extremities: No edema, + peripheral pulses    ASSESSMENT    sepsis,   esbl e coli bacteremia  uti,   electrolyte imbalance,   joseph,   syncope,   s/p fall,   nasal fx,   diarrhea,   c diff r/o,   h/o HTN,   DM,   HLD,   Hypothyroidism,   s/p cholecystectomy,   s/p hysterectomy  Depression      PLAN      cont tele,   cont bacid tid,   blood cx with esbl e coli noted above   rept blood cx   f/u ucx   id f/u   stool stool studies with Shiga-like toxin-producing E.coli (STEC) stx1/stx2: Detected noted above  abx changed to meropenem  ct abd pelv with Evaluation of the solid organs, vascular structures and GI tract is limited without oral and IV contrast.  Perinephric edema suggestive of bilateral pyelonephritis. Small ascites and small bilateral pleural effusions.  Right inguinal cystic structure with surrounding edema may represent abscess or infected plug from prior hernia repair. If DVT is suspected,   recommend ultrasound Doppler evaluation noted    doppler le b/l with No evidence of deep venous thrombosis in either lower extremity noted    surg f/u   Rt groin mass likely enlarged inguinal Lymph Node, reactive due to undergoing infectious process.   No evidence of abscess.   Recommend follow up US after completion of abx course   cardio f/u   echo with EF >70%. Grade I diastolic dysfunction. RV systolic pressure is mildly increased. Trivial pericardial effusion is seen noted above.  d/c ivf   serum creat wnl   supplement phos  pulm cons for pulm htn  f/u hgba1c   lispro ss,   phys tx eval    cont current meds       Patient is a 77y old  Female who presents with a chief complaint of sepsis 2/2 UTI (10 Sep 2022 10:13)    pt seen in tele [ x ], reg med floor [   ], bed [ x ], chair at bedside [   ], a+o x3 [ x ], lethargic [  ],    nad [ x ]      Allergies    No Known Allergies        Vitals    T(F): 99 (09-11-22 @ 04:28), Max: 99 (09-10-22 @ 07:52)  HR: 96 (09-11-22 @ 04:28) (86 - 100)  BP: 150/82 (09-11-22 @ 04:28) (125/77 - 150/82)  RR: 18 (09-11-22 @ 04:28) (18 - 19)  SpO2: 97% (09-11-22 @ 04:28) (96% - 100%)  Wt(kg): --  CAPILLARY BLOOD GLUCOSE      POCT Blood Glucose.: 262 mg/dL (10 Sep 2022 21:02)      Labs                          11.5   13.59 )-----------( 315      ( 10 Sep 2022 05:03 )             32.4       09-10    136  |  104  |  9   ----------------------------<  193<H>  3.5   |  21<L>  |  0.97    Ca    7.6<L>      10 Sep 2022 05:03  Phos  1.5     09-10  Mg     1.8     09-10    A1C with Estimated Average Glucose (09.09.22 @ 05:27)   A1C with Estimated Average Glucose Result: 6.5:       Culture - Urine (09.09.22 @ 13:25)   Specimen Source: Catheterized Catheterized   Culture Results:   >100,000 CFU/ml Escherichia coli         .Stool Other, stool  09-08 @ 14:20   No Protozoa seen by trichrome stain  No Helminths or Protozoa seen in formalin concentrate  performed by iodine stain  (routine O+P not evaluated for Microsporidia,  Cryptosporidia or Cyclospora)  One negative sample does not necessarily rule  out the presence of a parasitic infection.  Moderate White blood cells  --  --      .Blood Blood  09-07 @ 16:57   No growth to date.  --  --      .Blood Blood  09-07 @ 16:50   Growth in aerobic bottle: Escherichia coli  ***Blood Panel PCR results on this specimen are available  approximately 3 hours after the Gram stain result.***  Gram stain, PCR, and/or culture results may not always  correspond due to difference in methodologies.  ************************************************************  This PCR assay was performed by multiplex PCR. This  Assay tests for 66 bacterial and resistance gene targets.  Please refer to the [facility] test directory  at [email or web address] for details.  --  Blood Culture PCR          Radiology Results      Meds    MEDICATIONS  (STANDING):  dextrose 5%. 1000 milliLiter(s) (50 mL/Hr) IV Continuous <Continuous>  dextrose 5%. 1000 milliLiter(s) (100 mL/Hr) IV Continuous <Continuous>  dextrose 50% Injectable 25 Gram(s) IV Push once  dextrose 50% Injectable 12.5 Gram(s) IV Push once  dextrose 50% Injectable 25 Gram(s) IV Push once  glucagon  Injectable 1 milliGRAM(s) IntraMuscular once  heparin   Injectable 5000 Unit(s) SubCutaneous every 8 hours  insulin lispro (ADMELOG) corrective regimen sliding scale   SubCutaneous three times a day before meals  insulin lispro (ADMELOG) corrective regimen sliding scale   SubCutaneous at bedtime  lactobacillus acidophilus 1 Tablet(s) Oral three times a day  levothyroxine 75 MICROGram(s) Oral daily  meropenem  IVPB      meropenem  IVPB 1000 milliGRAM(s) IV Intermittent every 8 hours  metoprolol tartrate 12.5 milliGRAM(s) Oral two times a day  pantoprazole    Tablet 40 milliGRAM(s) Oral before breakfast      MEDICATIONS  (PRN):  acetaminophen     Tablet .. 650 milliGRAM(s) Oral every 6 hours PRN Temp greater or equal to 38C (100.4F), Mild Pain (1 - 3)  dextrose Oral Gel 15 Gram(s) Oral once PRN Blood Glucose LESS THAN 70 milliGRAM(s)/deciliter  ondansetron Injectable 4 milliGRAM(s) IV Push every 8 hours PRN Nausea and/or Vomiting      Physical Exam  Neuro :  no focal deficits  Respiratory: CTA B/L  CV: RRR, S1S2, no murmurs,   Abdominal: Soft, NT, ND +BS,  Extremities: No edema, + peripheral pulses    ASSESSMENT    sepsis,   esbl e coli bacteremia  uti,   electrolyte imbalance,   joseph,   syncope,   s/p fall,   nasal fx,   diarrhea,   c diff r/o,   h/o HTN,   DM,   HLD,   Hypothyroidism,   s/p cholecystectomy,   s/p hysterectomy  Depression      PLAN      cont tele,   cont bacid tid,   blood cx with esbl e coli noted above   f/u rept blood cx   ucx with e coli noted above  id f/u   stool stool studies with Shiga-like toxin-producing E.coli (STEC) stx1/stx2: Detected noted above  cont meropenem  ct abd pelv with Evaluation of the solid organs, vascular structures and GI tract is limited without oral and IV contrast.  Perinephric edema suggestive of bilateral pyelonephritis. Small ascites and small bilateral pleural effusions.  Right inguinal cystic structure with surrounding edema may represent abscess or infected plug from prior hernia repair. If DVT is suspected,   recommend ultrasound Doppler evaluation noted    doppler le b/l with No evidence of deep venous thrombosis in either lower extremity noted    surg f/u   Rt groin mass likely enlarged inguinal Lymph Node, reactive due to undergoing infectious process.   No evidence of abscess.   Recommend follow up US after completion of abx course   cardio f/u   echo with EF >70%. Grade I diastolic dysfunction. RV systolic pressure is mildly increased. Trivial pericardial effusion is seen noted above.  serum creat wnl   supplement phos for hypophosphatemia as needed   pulm cons for pulm htn/u   Bronchodilators  CCB.  hgba1c 6.5 noted above  lispro ss,   phys tx eval noted and rec phys tx 2-3x/week x 2 weeks with rolling walker at home with Home PT; with appropriate social support  cont current meds       Patient is a 77y old  Female who presents with a chief complaint of sepsis 2/2 UTI (10 Sep 2022 10:13)    pt seen in tele [ x ], reg med floor [   ], bed [ x ], chair at bedside [   ], a+o x3 [ x ], lethargic [  ],    nad [ x ]      Allergies    No Known Allergies        Vitals    T(F): 99 (09-11-22 @ 04:28), Max: 99 (09-10-22 @ 07:52)  HR: 96 (09-11-22 @ 04:28) (86 - 100)  BP: 150/82 (09-11-22 @ 04:28) (125/77 - 150/82)  RR: 18 (09-11-22 @ 04:28) (18 - 19)  SpO2: 97% (09-11-22 @ 04:28) (96% - 100%)  Wt(kg): --  CAPILLARY BLOOD GLUCOSE      POCT Blood Glucose.: 262 mg/dL (10 Sep 2022 21:02)      Labs                          11.5   13.59 )-----------( 315      ( 10 Sep 2022 05:03 )             32.4       09-10    136  |  104  |  9   ----------------------------<  193<H>  3.5   |  21<L>  |  0.97    Ca    7.6<L>      10 Sep 2022 05:03  Phos  1.5     09-10  Mg     1.8     09-10    A1C with Estimated Average Glucose (09.09.22 @ 05:27)   A1C with Estimated Average Glucose Result: 6.5:       Culture - Urine (09.09.22 @ 13:25)   Specimen Source: Catheterized Catheterized   Culture Results:   >100,000 CFU/ml Escherichia coli     Clostridium difficile Toxin by PCR (09.08.22 @ 12:30)   C Diff by PCR Result: NotDetec       .Stool Other, stool  09-08 @ 14:20   No Protozoa seen by trichrome stain  No Helminths or Protozoa seen in formalin concentrate  performed by iodine stain  (routine O+P not evaluated for Microsporidia,  Cryptosporidia or Cyclospora)  One negative sample does not necessarily rule  out the presence of a parasitic infection.  Moderate White blood cells  --  --      .Blood Blood  09-07 @ 16:57   No growth to date.  --  --      .Blood Blood  09-07 @ 16:50   Growth in aerobic bottle: Escherichia coli  ***Blood Panel PCR results on this specimen are available  approximately 3 hours after the Gram stain result.***  Gram stain, PCR, and/or culture results may not always  correspond due to difference in methodologies.  ************************************************************  This PCR assay was performed by multiplex PCR. This  Assay tests for 66 bacterial and resistance gene targets.  Please refer to the [facility] test directory  at [email or web address] for details.  --  Blood Culture PCR          Radiology Results      Meds    MEDICATIONS  (STANDING):  dextrose 5%. 1000 milliLiter(s) (50 mL/Hr) IV Continuous <Continuous>  dextrose 5%. 1000 milliLiter(s) (100 mL/Hr) IV Continuous <Continuous>  dextrose 50% Injectable 25 Gram(s) IV Push once  dextrose 50% Injectable 12.5 Gram(s) IV Push once  dextrose 50% Injectable 25 Gram(s) IV Push once  glucagon  Injectable 1 milliGRAM(s) IntraMuscular once  heparin   Injectable 5000 Unit(s) SubCutaneous every 8 hours  insulin lispro (ADMELOG) corrective regimen sliding scale   SubCutaneous three times a day before meals  insulin lispro (ADMELOG) corrective regimen sliding scale   SubCutaneous at bedtime  lactobacillus acidophilus 1 Tablet(s) Oral three times a day  levothyroxine 75 MICROGram(s) Oral daily  meropenem  IVPB      meropenem  IVPB 1000 milliGRAM(s) IV Intermittent every 8 hours  metoprolol tartrate 12.5 milliGRAM(s) Oral two times a day  pantoprazole    Tablet 40 milliGRAM(s) Oral before breakfast      MEDICATIONS  (PRN):  acetaminophen     Tablet .. 650 milliGRAM(s) Oral every 6 hours PRN Temp greater or equal to 38C (100.4F), Mild Pain (1 - 3)  dextrose Oral Gel 15 Gram(s) Oral once PRN Blood Glucose LESS THAN 70 milliGRAM(s)/deciliter  ondansetron Injectable 4 milliGRAM(s) IV Push every 8 hours PRN Nausea and/or Vomiting      Physical Exam  Neuro :  no focal deficits  Respiratory: CTA B/L  CV: RRR, S1S2, no murmurs,   Abdominal: Soft, NT, ND +BS,  Extremities: No edema, + peripheral pulses    ASSESSMENT    sepsis,   esbl e coli bacteremia  uti,   electrolyte imbalance,   joseph,   syncope,   s/p fall,   nasal fx,   diarrhea,   c diff r/o,   h/o HTN,   DM,   HLD,   Hypothyroidism,   s/p cholecystectomy,   s/p hysterectomy  Depression      PLAN      cont tele,   cont bacid tid,   blood cx with esbl e coli noted above   f/u rept blood cx   ucx with e coli noted above  id f/u   stool stool studies with Shiga-like toxin-producing E.coli (STEC) stx1/stx2: Detected noted above  cont meropenem   stool c diff neg noted above  ct abd pelv with Evaluation of the solid organs, vascular structures and GI tract is limited without oral and IV contrast.  Perinephric edema suggestive of bilateral pyelonephritis. Small ascites and small bilateral pleural effusions.  Right inguinal cystic structure with surrounding edema may represent abscess or infected plug from prior hernia repair. If DVT is suspected,   recommend ultrasound Doppler evaluation noted    doppler le b/l with No evidence of deep venous thrombosis in either lower extremity noted    surg f/u   Rt groin mass likely enlarged inguinal Lymph Node, reactive due to undergoing infectious process.   No evidence of abscess.   Recommend follow up US after completion of abx course   cardio f/u   echo with EF >70%. Grade I diastolic dysfunction. RV systolic pressure is mildly increased. Trivial pericardial effusion is seen noted above.  serum creat wnl   supplement phos for hypophosphatemia as needed   pulm cons for pulm htn/u   Bronchodilators  CCB.  hgba1c 6.5 noted above  lispro ss,   phys tx eval noted and rec phys tx 2-3x/week x 2 weeks with rolling walker at home with Home PT; with appropriate social support  cont current meds

## 2022-09-11 NOTE — PROGRESS NOTE ADULT - SUBJECTIVE AND OBJECTIVE BOX
CHIEF COMPLAINT:Patient is a 77y old  Female who presents with a chief complaint of sepsis 2/2 UTI .Pt appears comfortable.    	  REVIEW OF SYSTEMS:  CONSTITUTIONAL: No fever, weight loss, or fatigue  EYES: No eye pain, visual disturbances, or discharge  ENT:  No difficulty hearing, tinnitus, vertigo; No sinus or throat pain  NECK: No pain or stiffness  RESPIRATORY: No cough, wheezing, chills or hemoptysis; No Shortness of Breath  CARDIOVASCULAR: No chest pain, palpitations, passing out, dizziness, or leg swelling  GASTROINTESTINAL: No abdominal or epigastric pain. No nausea, vomiting, or hematemesis; No diarrhea or constipation. No melena or hematochezia.  GENITOURINARY: No dysuria, frequency, hematuria, or incontinence  NEUROLOGICAL: No headaches, memory loss, loss of strength, numbness, or tremors  SKIN: No itching, burning, rashes, or lesions   LYMPH Nodes: No enlarged glands  ENDOCRINE: No heat or cold intolerance; No hair loss  MUSCULOSKELETAL: No joint pain or swelling; No muscle, back, or extremity pain  PSYCHIATRIC: No depression, anxiety, mood swings, or difficulty sleeping  HEME/LYMPH: No easy bruising, or bleeding gums  ALLERGY AND IMMUNOLOGIC: No hives or eczema	      PHYSICAL EXAM:  T(C): 36.7 (09-11-22 @ 07:21), Max: 37.2 (09-11-22 @ 04:28)  HR: 94 (09-11-22 @ 07:21) (86 - 100)  BP: 156/62 (09-11-22 @ 07:21) (125/77 - 156/62)  RR: 19 (09-11-22 @ 07:21) (18 - 19)  SpO2: 95% (09-11-22 @ 07:21) (95% - 100%)  Wt(kg): --  I&O's Summary    10 Sep 2022 07:01  -  11 Sep 2022 07:00  --------------------------------------------------------  IN: 50 mL / OUT: 0 mL / NET: 50 mL        Appearance: Normal	  HEENT:   Normal oral mucosa, PERRL, EOMI	  Lymphatic: No lymphadenopathy  Cardiovascular: Normal S1 S2, No JVD, No murmurs, No edema  Respiratory: Lungs clear to auscultation	  Psychiatry: A & O x 3, Mood & affect appropriate  Gastrointestinal:  Soft, Non-tender, + BS	  Skin: No rashes, No ecchymoses, No cyanosis	  Neurologic: Non-focal  Extremities: Normal range of motion, No clubbing, cyanosis or edema  Vascular: Peripheral pulses palpable 2+ bilaterally    MEDICATIONS  (STANDING):  dextrose 5%. 1000 milliLiter(s) (100 mL/Hr) IV Continuous <Continuous>  dextrose 5%. 1000 milliLiter(s) (50 mL/Hr) IV Continuous <Continuous>  dextrose 50% Injectable 25 Gram(s) IV Push once  dextrose 50% Injectable 12.5 Gram(s) IV Push once  dextrose 50% Injectable 25 Gram(s) IV Push once  glucagon  Injectable 1 milliGRAM(s) IntraMuscular once  heparin   Injectable 5000 Unit(s) SubCutaneous every 8 hours  insulin lispro (ADMELOG) corrective regimen sliding scale   SubCutaneous three times a day before meals  insulin lispro (ADMELOG) corrective regimen sliding scale   SubCutaneous at bedtime  lactobacillus acidophilus 1 Tablet(s) Oral three times a day  levothyroxine 75 MICROGram(s) Oral daily  meropenem  IVPB      meropenem  IVPB 1000 milliGRAM(s) IV Intermittent every 8 hours  metoprolol tartrate 12.5 milliGRAM(s) Oral two times a day  pantoprazole    Tablet 40 milliGRAM(s) Oral before breakfast      	  	  LABS:	 	                        11.5   13.59 )-----------( 315      ( 10 Sep 2022 05:03 )             32.4     09-10    136  |  104  |  9   ----------------------------<  193<H>  3.5   |  21<L>  |  0.97    Ca    7.6<L>      10 Sep 2022 05:03  Phos  1.5     09-10  Mg     1.8     09-10      proBNP: Serum Pro-Brain Natriuretic Peptide: 903 pg/mL (09-07 @ 16:10)      TSH: Thyroid Stimulating Hormone, Serum: 2.00 uU/mL (09-09 @ 05:27)

## 2022-09-11 NOTE — PROGRESS NOTE ADULT - ASSESSMENT
Patient is a 77y old  Female from home w/ PMHx of HTN, DM, HLD, Hypothyroidism, s/p cholecystectomy, s/p hysterectomy, now presents to the ER for evaluation of generalized weakness, dizziness and dysuria for the past 3 days, watery diarrhea and associated with vomiting  and lower abdominal pain. She has dizziness causing her to fall and hit her face. On admission, she found to have fever, tachycardia, Leukocytosis and positive Urine analysis. The CT abd/pelvis shows  Perinephric edema suggestive of bilateral pyelonephritis and Right inguinal cystic structure with surrounding edema may represent  abscess or infected plug from prior hernia repair. She has started on Ceftriaxone and The ID consult requested to assist with further evaluation and antibiotic management,     # Sepsis ( Fever + tachycardia + Leukocytosis )- resolving  # UTI - WBC >50 /HPF Sq Epi: x / Non Sq Epi: Moderate /HPF / Bacteria: Many /HPF- Urine  Cx  from 9/9 grew  E.coli  # Bilateral pyelonephritis  # Right inguinal suspected abscess- ??  # ESBL  E.coli Bacteremia- 9/7/22 - source most likely - Repeat Blood culture from 9/10/22 is negative to date   # Shiga-like toxin-producing E.coli STEC - detected on GI PCR panel    would recommend:    1. Azithromycin 500 mg X 3 days to cover STEC  2. Change Meropenem to Ertapenem 1 g daily to cover ESBL  E.coli  3. Monitor WBC count, is trending down  4. Monitor kidney function and IVF    Attending Attestation:    Spent more than 45 minutes on total encounter, more than 50 % of the visit was spent counseling and/or coordinating care by the Attending physician.

## 2022-09-11 NOTE — PROGRESS NOTE ADULT - SUBJECTIVE AND OBJECTIVE BOX
Patient is seen and examined at the bed side, is afebrile. The Leukocytosis is trending down. The repeat Blood culture from 9/10 is negative to date.      REVIEW OF SYSTEMS: All other review systems are negative      ALLERGIES: No Known Allergies      Vital Signs Last 24 Hrs  T(C): 37.3 (11 Sep 2022 10:47), Max: 37.3 (11 Sep 2022 10:47)  T(F): 99.1 (11 Sep 2022 10:47), Max: 99.1 (11 Sep 2022 10:47)  HR: 88 (11 Sep 2022 10:47) (88 - 100)  BP: 134/61 (11 Sep 2022 10:47) (125/77 - 156/62)  BP(mean): --  RR: 17 (11 Sep 2022 10:) (17 - 19)  SpO2: 99% (11 Sep 2022 10:47) (95% - 99%)    Parameters below as of 11 Sep 2022 10:47  Patient On (Oxygen Delivery Method): room air        PHYSICAL EXAM:  GENERAL: Not in distress   CHEST/LUNG: Not using accessory muscles   HEART: s1 and s2 present  ABDOMEN:  Nontender and  Nondistended  EXTREMITIES: No pedal  edema  CNS: Awake and Alert      LABS:                        11.5   13.59 )-----------( 315      ( 10 Sep 2022 05:03 )             32.4                           11.0   16.66 )-----------( 269      ( 08 Sep 2022 05:30 )             30.9       09-10    136  |  104  |  9   ----------------------------<  193<H>  3.5   |  21<L>  |  0.97    Ca    7.6<L>      10 Sep 2022 05:03  Phos  1.5     09-10  Mg     1.8     09-10      09-08    136  |  105  |  17  ----------------------------<  230<H>  3.3<L>   |  18<L>  |  1.31<H>    Ca    7.4<L>      08 Sep 2022 05:30  Phos  1.4     09-08  Mg     1.4     09-08    TPro  5.8<L>  /  Alb  2.3<L>  /  TBili  0.4  /  DBili  x   /  AST  17  /  ALT  15  /  AlkPhos  55          CAPILLARY BLOOD GLUCOSE  POCT Blood Glucose.: 235 mg/dL (08 Sep 2022 09:27)  POCT Blood Glucose.: 301 mg/dL (07 Sep 2022 13:56)        Urinalysis Basic - ( 07 Sep 2022 19:20 )  Color: Yellow / Appearance: Slightly Turbid / S.020 / pH: x  Gluc: x / Ketone: Negative  / Bili: Negative / Urobili: Negative   Blood: x / Protein: 100 / Nitrite: Negative   Leuk Esterase: Moderate / RBC: 5-10 /HPF / WBC >50 /HPF   Sq Epi: x / Non Sq Epi: Moderate /HPF / Bacteria: Many /HPF        MEDICATIONS  (STANDING):    glucagon  Injectable 1 milliGRAM(s) IntraMuscular once  heparin   Injectable 5000 Unit(s) SubCutaneous every 8 hours  insulin lispro (ADMELOG) corrective regimen sliding scale   SubCutaneous three times a day before meals  insulin lispro (ADMELOG) corrective regimen sliding scale   SubCutaneous at bedtime  lactobacillus acidophilus 1 Tablet(s) Oral three times a day  levothyroxine 75 MICROGram(s) Oral daily  meropenem  IVPB      meropenem  IVPB 1000 milliGRAM(s) IV Intermittent every 8 hours  metoprolol tartrate 12.5 milliGRAM(s) Oral two times a day  pantoprazole    Tablet 40 milliGRAM(s) Oral before breakfast        RADIOLOGY & ADDITIONAL TESTS:    22 : US Duplex Venous Lower Ext Complete, Bilateral (22 @ 11:57) No evidence of deep venous thrombosis in either lower extremity.      22: CT Abdomen and Pelvis No Cont (22 @ 09:14) >Evaluation of the solid organs, vascular structures and GI tract is   limited without oral and IV contrast.  Perinephric edema suggestive of bilateral pyelonephritis. Small ascites  and small bilateral pleural effusions.    Right inguinal cystic structure with surrounding edema may represent  abscess or infected plug from prior hernia repair. If DVT is suspected,   recommend ultrasound Doppler evaluation.    Findings were discussed with LUIS DANIEL DODGE [id number] 2022 10:57  AM by Dr. Brianna Roy with read back confirmation.      22 : Xray Chest 1 View- PORTABLE-Urgent (22 @ 17:02) IMPRESSION: No acute finding or change.      MICROBIOLOGY  DATA:    Culture - Blood (09.10.22 @ 12:20)   Specimen Source: .Blood Blood   Culture Results: No growth to date.     Culture - Urine (22 @ 13:25)   Specimen Source: Catheterized Catheterized   Culture Results: >100,000 CFU/ml Escherichia coli     GI PCR Panel Stool (22 @ 19:20)   GI PCR Panel: Detected:   Shiga-like toxin-producing E.coli (STEC) stx1/stx2: Detected     Clostridium difficile Toxin by PCR (22 @ 12:30)   C Diff by PCR Result: NotDetec     Culture - Blood (22 @ 16:57)   Specimen Source: .Blood Blood   Culture Results: No growth to date.     Culture - Blood (22 @ 16:50)   - ESBL: Detec   - Escherichia coli: Detec   - CTX-M Resistance Marker: Detec   - Trimethoprim/Sulfamethoxazole: R >2/38   Gram Stain:   Growth in aerobic bottle: Gram Negative Rods   - Amikacin: S <=16   - Ampicillin: R >16 These ampicillin results predict results for amoxicillin   - Ampicillin/Sulbactam: R >16/8 Enterobacter, Klebsiella aerogenes, Citrobacter, and Serratia may develop resistance during prolonged therapy (3-4 days)   - Aztreonam: R >16   - Cefazolin: R >16 Enterobacter, Klebsiella aerogenes, Citrobacter, and Serratia may develop resistance during prolonged therapy (3-4 days)   - Cefepime: R >16   - Ceftriaxone: R >32 Enterobacter, Klebsiella aerogenes, Citrobacter, and Serratia may develop resistance during prolonged therapy   - Ciprofloxacin: R >2   - Ertapenem: S <=0.5   - Gentamicin: R >8   - Imipenem: S <=1   - Levofloxacin: R >4   - Meropenem: S <=1   - Piperacillin/Tazobactam: R <=8   - Tobramycin: R >8   Specimen Source: .Blood Blood   Organism: Blood Culture PCR   Organism: Escherichia coli ESBL   Culture Results:   Growth in aerobic bottle: Escherichia coli ESBL   Rapid HIV-1/2 Antibody (22 @ 16:10)   Rapid HIV-1/2 Antibody: Nonreact:     COVID-19 PCR (22 @ 16:10)   COVID-19 PCR: NotDetec:

## 2022-09-11 NOTE — PROGRESS NOTE ADULT - PROBLEM SELECTOR PLAN 1
panculture noted  antibiotics  monitor temp and WBC.  ID eval panculture noted  antibiotics  monitor temp and WBC.  ID follow up

## 2022-09-11 NOTE — PROGRESS NOTE ADULT - SUBJECTIVE AND OBJECTIVE BOX
Patient is a 77y old  Female who presents with a chief complaint of sepsis 2/2 UTI (11 Sep 2022 09:47)    Patient is awake, alert, laying comfortably in the bed in no acute distress at room air.    INTERVAL HPI/OVERNIGHT EVENTS:      VITAL SIGNS:  T(F): 98.1 (09-11-22 @ 07:21)  HR: 94 (09-11-22 @ 07:21)  BP: 156/62 (09-11-22 @ 07:21)  RR: 19 (09-11-22 @ 07:21)  SpO2: 95% (09-11-22 @ 07:21)  Wt(kg): --  I&O's Detail    10 Sep 2022 07:01  -  11 Sep 2022 07:00  --------------------------------------------------------  IN:    IV PiggyBack: 50 mL  Total IN: 50 mL    OUT:  Total OUT: 0 mL    Total NET: 50 mL              REVIEW OF SYSTEMS:    CONSTITUTIONAL:  No fevers, chills, sweats    HEENT:  Eyes:  No diplopia or blurred vision. ENT:  No earache, sore throat or runny nose.    CARDIOVASCULAR:  No pressure, squeezing, tightness, or heaviness about the chest; no palpitations.    RESPIRATORY:  Per HPI    GASTROINTESTINAL:  No abdominal pain, nausea, vomiting or diarrhea.    GENITOURINARY:  No dysuria, frequency or urgency.    NEUROLOGIC:  No paresthesias, fasciculations, seizures or weakness.    PSYCHIATRIC:  No disorder of thought or mood.      PHYSICAL EXAM:    Constitutional: Well developed and nourished  Eyes:Perrla  ENMT: normal  Neck:supple  Respiratory: good air entry  Cardiovascular: S1 S2 regular  Gastrointestinal: Soft, Non tender  Extremities: No edema  Vascular:normal  Neurological:Awake, alert,Ox3  Musculoskeletal:Normal      MEDICATIONS  (STANDING):  dextrose 5%. 1000 milliLiter(s) (50 mL/Hr) IV Continuous <Continuous>  dextrose 5%. 1000 milliLiter(s) (100 mL/Hr) IV Continuous <Continuous>  dextrose 50% Injectable 25 Gram(s) IV Push once  dextrose 50% Injectable 12.5 Gram(s) IV Push once  dextrose 50% Injectable 25 Gram(s) IV Push once  glucagon  Injectable 1 milliGRAM(s) IntraMuscular once  heparin   Injectable 5000 Unit(s) SubCutaneous every 8 hours  insulin lispro (ADMELOG) corrective regimen sliding scale   SubCutaneous three times a day before meals  insulin lispro (ADMELOG) corrective regimen sliding scale   SubCutaneous at bedtime  lactobacillus acidophilus 1 Tablet(s) Oral three times a day  levothyroxine 75 MICROGram(s) Oral daily  meropenem  IVPB      meropenem  IVPB 1000 milliGRAM(s) IV Intermittent every 8 hours  metoprolol tartrate 12.5 milliGRAM(s) Oral two times a day  pantoprazole    Tablet 40 milliGRAM(s) Oral before breakfast    MEDICATIONS  (PRN):  acetaminophen     Tablet .. 650 milliGRAM(s) Oral every 6 hours PRN Temp greater or equal to 38C (100.4F), Mild Pain (1 - 3)  dextrose Oral Gel 15 Gram(s) Oral once PRN Blood Glucose LESS THAN 70 milliGRAM(s)/deciliter  ondansetron Injectable 4 milliGRAM(s) IV Push every 8 hours PRN Nausea and/or Vomiting      Allergies    No Known Allergies    Intolerances        LABS:                        11.5   13.59 )-----------( 315      ( 10 Sep 2022 05:03 )             32.4     09-10    136  |  104  |  9   ----------------------------<  193<H>  3.5   |  21<L>  |  0.97    Ca    7.6<L>      10 Sep 2022 05:03  Phos  1.5     09-10  Mg     1.8     09-10                CAPILLARY BLOOD GLUCOSE      POCT Blood Glucose.: 201 mg/dL (11 Sep 2022 08:03)  POCT Blood Glucose.: 262 mg/dL (10 Sep 2022 21:02)  POCT Blood Glucose.: 208 mg/dL (10 Sep 2022 16:36)  POCT Blood Glucose.: 238 mg/dL (10 Sep 2022 11:40)    pro-bnp 903 09-07 @ 16:10     d-dimer --  09-07 @ 16:10      RADIOLOGY & ADDITIONAL TESTS:    CXR:  < from: US Duplex Venous Lower Ext Complete, Bilateral (09.08.22 @ 11:57) >  IMPRESSION:  No evidence of deep venous thrombosis in either lower extremity.          --- End of Report ---    < end of copied text >  < from: CT Abdomen and Pelvis No Cont (09.08.22 @ 09:14) >  IMPRESSION:  Evaluation of the solid organs, vascular structures and GI tract is   limited without oral and IV contrast.    Perinephric edema suggestive of bilateral pyelonephritis. Small ascites   and small bilateral pleural effusions.    Right inguinal cystic structure with surrounding edema may represent   abscess or infected plug from prior hernia repair. If DVT is suspected,   recommend ultrasound Doppler evaluation.    < end of copied text >    < from: Transthoracic Echocardiogram (09.08.22 @ 07:02) >  CONCLUSIONS:  1. Normal mitral valve.  2. Normal trileaflet aortic valve.  3. Aortic Root: 2.7 cm.    4. Normal left atrium.  LA volume index = 18 cc/m2.  5. Normal left ventricular internal dimensions and wall  thicknesses.  6. Hyperdynamic left ventricular systolic function (EF  >70%).  7. Grade I diastolic dysfunction (Impaired relaxation,  mild).  8. Normal right atrium.  9. Normal right ventricular size and systolic function  (TAPSE  2.3cm).  10. RA Pressure is 8 mm Hg.  11. RV systolic pressure is mildly increased at  44 mm Hg.  12. Normal tricuspid valve.  13. Normal pulmonic valve.  14. Trivial pericardial effusion is s    < end of copied text >        Culture - Urine (09.09.22 @ 13:25)   Specimen Source: Catheterized Catheterized   Culture Results:   >100,000 CFU/ml Escherichia coli Ova and Parasites (09.08.22 @ 14:20)   Culture Results:   No Protozoa seen by trichrome stain   No Helminths or Protozoa seen in formalin concentrate   performed by iodine stain Culture - Blood (09.07.22 @ 16:57)   Specimen Source: .Blood Blood   Culture Results:   No growth to date. Culture - Blood (09.07.22 @ 16:50)   - ESBL: Detec   - Escherichia coli: Detec   - CTX-M Resistance Marker: Detec   Ct scan chest:    ekg;    echo:

## 2022-09-12 DIAGNOSIS — R78.81 BACTEREMIA: ICD-10-CM

## 2022-09-12 DIAGNOSIS — R05.9 COUGH, UNSPECIFIED: ICD-10-CM

## 2022-09-12 DIAGNOSIS — R19.7 DIARRHEA, UNSPECIFIED: ICD-10-CM

## 2022-09-12 LAB
-  AMIKACIN: SIGNIFICANT CHANGE UP
-  AMOXICILLIN/CLAVULANIC ACID: SIGNIFICANT CHANGE UP
-  AMPICILLIN/SULBACTAM: SIGNIFICANT CHANGE UP
-  AMPICILLIN: SIGNIFICANT CHANGE UP
-  AMPICILLIN: SIGNIFICANT CHANGE UP
-  AZTREONAM: SIGNIFICANT CHANGE UP
-  CEFAZOLIN: SIGNIFICANT CHANGE UP
-  CEFEPIME: SIGNIFICANT CHANGE UP
-  CEFTRIAXONE: SIGNIFICANT CHANGE UP
-  CIPROFLOXACIN: SIGNIFICANT CHANGE UP
-  CIPROFLOXACIN: SIGNIFICANT CHANGE UP
-  ERTAPENEM: SIGNIFICANT CHANGE UP
-  GENTAMICIN: SIGNIFICANT CHANGE UP
-  IMIPENEM: SIGNIFICANT CHANGE UP
-  LEVOFLOXACIN: SIGNIFICANT CHANGE UP
-  LEVOFLOXACIN: SIGNIFICANT CHANGE UP
-  MEROPENEM: SIGNIFICANT CHANGE UP
-  NITROFURANTOIN: SIGNIFICANT CHANGE UP
-  NITROFURANTOIN: SIGNIFICANT CHANGE UP
-  PIPERACILLIN/TAZOBACTAM: SIGNIFICANT CHANGE UP
-  TETRACYCLINE: SIGNIFICANT CHANGE UP
-  TIGECYCLINE: SIGNIFICANT CHANGE UP
-  TOBRAMYCIN: SIGNIFICANT CHANGE UP
-  TRIMETHOPRIM/SULFAMETHOXAZOLE: SIGNIFICANT CHANGE UP
-  VANCOMYCIN: SIGNIFICANT CHANGE UP
ALBUMIN SERPL ELPH-MCNC: 2 G/DL — LOW (ref 3.5–5)
ALP SERPL-CCNC: 91 U/L — SIGNIFICANT CHANGE UP (ref 40–120)
ALT FLD-CCNC: 49 U/L DA — SIGNIFICANT CHANGE UP (ref 10–60)
ANION GAP SERPL CALC-SCNC: 7 MMOL/L — SIGNIFICANT CHANGE UP (ref 5–17)
ANISOCYTOSIS BLD QL: SLIGHT — SIGNIFICANT CHANGE UP
AST SERPL-CCNC: 41 U/L — HIGH (ref 10–40)
BASOPHILS # BLD AUTO: 0 K/UL — SIGNIFICANT CHANGE UP (ref 0–0.2)
BASOPHILS NFR BLD AUTO: 0 % — SIGNIFICANT CHANGE UP (ref 0–2)
BILIRUB SERPL-MCNC: 0.4 MG/DL — SIGNIFICANT CHANGE UP (ref 0.2–1.2)
BUN SERPL-MCNC: 12 MG/DL — SIGNIFICANT CHANGE UP (ref 7–18)
CALCIUM SERPL-MCNC: 8.4 MG/DL — SIGNIFICANT CHANGE UP (ref 8.4–10.5)
CHLORIDE SERPL-SCNC: 105 MMOL/L — SIGNIFICANT CHANGE UP (ref 96–108)
CO2 SERPL-SCNC: 27 MMOL/L — SIGNIFICANT CHANGE UP (ref 22–31)
CREAT SERPL-MCNC: 0.99 MG/DL — SIGNIFICANT CHANGE UP (ref 0.5–1.3)
CULTURE RESULTS: SIGNIFICANT CHANGE UP
CULTURE RESULTS: SIGNIFICANT CHANGE UP
EGFR: 59 ML/MIN/1.73M2 — LOW
EOSINOPHIL # BLD AUTO: 0.1 K/UL — SIGNIFICANT CHANGE UP (ref 0–0.5)
EOSINOPHIL NFR BLD AUTO: 1 % — SIGNIFICANT CHANGE UP (ref 0–6)
GLUCOSE BLDC GLUCOMTR-MCNC: 196 MG/DL — HIGH (ref 70–99)
GLUCOSE BLDC GLUCOMTR-MCNC: 271 MG/DL — HIGH (ref 70–99)
GLUCOSE BLDC GLUCOMTR-MCNC: 274 MG/DL — HIGH (ref 70–99)
GLUCOSE BLDC GLUCOMTR-MCNC: 280 MG/DL — HIGH (ref 70–99)
GLUCOSE BLDC GLUCOMTR-MCNC: 287 MG/DL — HIGH (ref 70–99)
GLUCOSE SERPL-MCNC: 201 MG/DL — HIGH (ref 70–99)
HCT VFR BLD CALC: 29.5 % — LOW (ref 34.5–45)
HGB BLD-MCNC: 10 G/DL — LOW (ref 11.5–15.5)
LYMPHOCYTES # BLD AUTO: 1.77 K/UL — SIGNIFICANT CHANGE UP (ref 1–3.3)
LYMPHOCYTES # BLD AUTO: 17 % — SIGNIFICANT CHANGE UP (ref 13–44)
MAGNESIUM SERPL-MCNC: 1.8 MG/DL — SIGNIFICANT CHANGE UP (ref 1.6–2.6)
MANUAL SMEAR VERIFICATION: SIGNIFICANT CHANGE UP
MCHC RBC-ENTMCNC: 29.2 PG — SIGNIFICANT CHANGE UP (ref 27–34)
MCHC RBC-ENTMCNC: 33.9 GM/DL — SIGNIFICANT CHANGE UP (ref 32–36)
MCV RBC AUTO: 86.3 FL — SIGNIFICANT CHANGE UP (ref 80–100)
METAMYELOCYTES # FLD: 3 % — HIGH (ref 0–0)
METHOD TYPE: SIGNIFICANT CHANGE UP
METHOD TYPE: SIGNIFICANT CHANGE UP
MICROCYTES BLD QL: SLIGHT — SIGNIFICANT CHANGE UP
MONOCYTES # BLD AUTO: 0.73 K/UL — SIGNIFICANT CHANGE UP (ref 0–0.9)
MONOCYTES NFR BLD AUTO: 7 % — SIGNIFICANT CHANGE UP (ref 2–14)
NEUTROPHILS # BLD AUTO: 7.52 K/UL — HIGH (ref 1.8–7.4)
NEUTROPHILS NFR BLD AUTO: 69 % — SIGNIFICANT CHANGE UP (ref 43–77)
NEUTS BAND # BLD: 3 % — SIGNIFICANT CHANGE UP (ref 0–8)
NRBC # BLD: 0 /100 — SIGNIFICANT CHANGE UP (ref 0–0)
ORGANISM # SPEC MICROSCOPIC CNT: SIGNIFICANT CHANGE UP
PHOSPHATE SERPL-MCNC: 1.5 MG/DL — LOW (ref 2.5–4.5)
PLAT MORPH BLD: NORMAL — SIGNIFICANT CHANGE UP
PLATELET # BLD AUTO: 439 K/UL — HIGH (ref 150–400)
PLATELET COUNT - ESTIMATE: NORMAL — SIGNIFICANT CHANGE UP
POIKILOCYTOSIS BLD QL AUTO: SLIGHT — SIGNIFICANT CHANGE UP
POTASSIUM SERPL-MCNC: 3.5 MMOL/L — SIGNIFICANT CHANGE UP (ref 3.5–5.3)
POTASSIUM SERPL-SCNC: 3.5 MMOL/L — SIGNIFICANT CHANGE UP (ref 3.5–5.3)
PROT SERPL-MCNC: 5.6 G/DL — LOW (ref 6–8.3)
RBC # BLD: 3.42 M/UL — LOW (ref 3.8–5.2)
RBC # FLD: 14 % — SIGNIFICANT CHANGE UP (ref 10.3–14.5)
RBC BLD AUTO: ABNORMAL
SODIUM SERPL-SCNC: 139 MMOL/L — SIGNIFICANT CHANGE UP (ref 135–145)
SPECIMEN SOURCE: SIGNIFICANT CHANGE UP
SPECIMEN SOURCE: SIGNIFICANT CHANGE UP
WBC # BLD: 10.44 K/UL — SIGNIFICANT CHANGE UP (ref 3.8–10.5)
WBC # FLD AUTO: 10.44 K/UL — SIGNIFICANT CHANGE UP (ref 3.8–10.5)

## 2022-09-12 RX ORDER — INSULIN GLARGINE 100 [IU]/ML
10 INJECTION, SOLUTION SUBCUTANEOUS AT BEDTIME
Refills: 0 | Status: DISCONTINUED | OUTPATIENT
Start: 2022-09-12 | End: 2022-09-15

## 2022-09-12 RX ORDER — LOSARTAN POTASSIUM 100 MG/1
100 TABLET, FILM COATED ORAL DAILY
Refills: 0 | Status: DISCONTINUED | OUTPATIENT
Start: 2022-09-12 | End: 2022-09-15

## 2022-09-12 RX ORDER — AMLODIPINE BESYLATE 2.5 MG/1
5 TABLET ORAL DAILY
Refills: 0 | Status: DISCONTINUED | OUTPATIENT
Start: 2022-09-12 | End: 2022-09-15

## 2022-09-12 RX ORDER — POTASSIUM PHOSPHATE, MONOBASIC POTASSIUM PHOSPHATE, DIBASIC 236; 224 MG/ML; MG/ML
30 INJECTION, SOLUTION INTRAVENOUS ONCE
Refills: 0 | Status: COMPLETED | OUTPATIENT
Start: 2022-09-12 | End: 2022-09-12

## 2022-09-12 RX ADMIN — Medication 1 TABLET(S): at 21:21

## 2022-09-12 RX ADMIN — Medication 12.5 MILLIGRAM(S): at 17:08

## 2022-09-12 RX ADMIN — AZITHROMYCIN 255 MILLIGRAM(S): 500 TABLET, FILM COATED ORAL at 19:12

## 2022-09-12 RX ADMIN — Medication 650 MILLIGRAM(S): at 05:24

## 2022-09-12 RX ADMIN — Medication 1: at 08:03

## 2022-09-12 RX ADMIN — Medication 3: at 12:05

## 2022-09-12 RX ADMIN — Medication 1 TABLET(S): at 05:16

## 2022-09-12 RX ADMIN — ERTAPENEM SODIUM 120 MILLIGRAM(S): 1 INJECTION, POWDER, LYOPHILIZED, FOR SOLUTION INTRAMUSCULAR; INTRAVENOUS at 18:25

## 2022-09-12 RX ADMIN — HEPARIN SODIUM 5000 UNIT(S): 5000 INJECTION INTRAVENOUS; SUBCUTANEOUS at 13:59

## 2022-09-12 RX ADMIN — POTASSIUM PHOSPHATE, MONOBASIC POTASSIUM PHOSPHATE, DIBASIC 83.33 MILLIMOLE(S): 236; 224 INJECTION, SOLUTION INTRAVENOUS at 09:46

## 2022-09-12 RX ADMIN — Medication 650 MILLIGRAM(S): at 10:26

## 2022-09-12 RX ADMIN — HEPARIN SODIUM 5000 UNIT(S): 5000 INJECTION INTRAVENOUS; SUBCUTANEOUS at 05:16

## 2022-09-12 RX ADMIN — Medication 75 MICROGRAM(S): at 05:16

## 2022-09-12 RX ADMIN — Medication 12.5 MILLIGRAM(S): at 05:16

## 2022-09-12 RX ADMIN — Medication 650 MILLIGRAM(S): at 11:00

## 2022-09-12 RX ADMIN — INSULIN GLARGINE 10 UNIT(S): 100 INJECTION, SOLUTION SUBCUTANEOUS at 22:31

## 2022-09-12 RX ADMIN — PANTOPRAZOLE SODIUM 40 MILLIGRAM(S): 20 TABLET, DELAYED RELEASE ORAL at 05:16

## 2022-09-12 RX ADMIN — Medication 1 TABLET(S): at 13:59

## 2022-09-12 RX ADMIN — Medication 1: at 21:22

## 2022-09-12 RX ADMIN — Medication 3: at 16:46

## 2022-09-12 RX ADMIN — HEPARIN SODIUM 5000 UNIT(S): 5000 INJECTION INTRAVENOUS; SUBCUTANEOUS at 21:21

## 2022-09-12 NOTE — PROGRESS NOTE ADULT - SUBJECTIVE AND OBJECTIVE BOX
Patient is a 77y old  Female who presents with a chief complaint of sepsis 2/2 UTI (12 Sep 2022 12:43)    OVERNIGHT EVENTS: no acute changes    REVIEW OF SYSTEMS:  CONSTITUTIONAL: No fever, chills  ENMT:  No difficulty hearing, no change in vision  NECK: No pain or stiffness  RESPIRATORY: +cough. no SOB  CARDIOVASCULAR: No chest pain, palpitations  GASTROINTESTINAL: No abdominal pain. No nausea, vomiting, or diarrhea  GENITOURINARY: No dysuria  NEUROLOGICAL: +dizziness. No HA  SKIN: No itching, burning, rashes, or lesions   LYMPH NODES: No enlarged glands  ENDOCRINE: No heat or cold intolerance; No hair loss  MUSCULOSKELETAL: No joint pain or swelling; No muscle, back, or extremity pain  PSYCHIATRIC: No depression, anxiety  HEME/LYMPH: No easy bruising, or bleeding gums    T(C): 36.2 (09-12-22 @ 15:59), Max: 37.1 (09-11-22 @ 19:47)  HR: 81 (09-12-22 @ 15:59) (81 - 100)  BP: 146/54 (09-12-22 @ 15:59) (120/77 - 156/61)  RR: 18 (09-12-22 @ 15:59) (18 - 19)  SpO2: 97% (09-12-22 @ 15:59) (94% - 98%)  Wt(kg): --Vital Signs Last 24 Hrs  T(C): 36.2 (12 Sep 2022 15:59), Max: 37.1 (11 Sep 2022 19:47)  T(F): 97.2 (12 Sep 2022 15:59), Max: 98.8 (11 Sep 2022 19:47)  HR: 81 (12 Sep 2022 15:59) (81 - 100)  BP: 146/54 (12 Sep 2022 15:59) (120/77 - 156/61)  BP(mean): --  RR: 18 (12 Sep 2022 15:59) (18 - 19)  SpO2: 97% (12 Sep 2022 15:59) (94% - 98%)    Parameters below as of 12 Sep 2022 15:59  Patient On (Oxygen Delivery Method): room air    MEDICATIONS  (STANDING):  azithromycin  IVPB      azithromycin  IVPB 500 milliGRAM(s) IV Intermittent every 24 hours  dextrose 5%. 1000 milliLiter(s) (100 mL/Hr) IV Continuous <Continuous>  dextrose 5%. 1000 milliLiter(s) (50 mL/Hr) IV Continuous <Continuous>  dextrose 50% Injectable 25 Gram(s) IV Push once  dextrose 50% Injectable 12.5 Gram(s) IV Push once  dextrose 50% Injectable 25 Gram(s) IV Push once  ertapenem  IVPB 1000 milliGRAM(s) IV Intermittent every 24 hours  glucagon  Injectable 1 milliGRAM(s) IntraMuscular once  heparin   Injectable 5000 Unit(s) SubCutaneous every 8 hours  insulin lispro (ADMELOG) corrective regimen sliding scale   SubCutaneous three times a day before meals  insulin lispro (ADMELOG) corrective regimen sliding scale   SubCutaneous at bedtime  lactobacillus acidophilus 1 Tablet(s) Oral three times a day  levothyroxine 75 MICROGram(s) Oral daily  metoprolol tartrate 12.5 milliGRAM(s) Oral two times a day  pantoprazole    Tablet 40 milliGRAM(s) Oral before breakfast    MEDICATIONS  (PRN):  acetaminophen     Tablet .. 650 milliGRAM(s) Oral every 6 hours PRN Temp greater or equal to 38C (100.4F), Mild Pain (1 - 3)  dextrose Oral Gel 15 Gram(s) Oral once PRN Blood Glucose LESS THAN 70 milliGRAM(s)/deciliter  ondansetron Injectable 4 milliGRAM(s) IV Push every 8 hours PRN Nausea and/or Vomiting      PHYSICAL EXAM:  GENERAL: NAD  EYES: clear conjunctiva; EOMI  ENMT: Moist mucous membranes  NECK: Supple, No JVD, Normal thyroid  CHEST/LUNG: Clear to auscultation bilaterally; No rales, rhonchi, wheezing, or rubs  HEART: S1, S2, Regular rate and rhythm  ABDOMEN: Soft, Nontender, Nondistended; Bowel sounds present  NEURO: Alert & Oriented X3  EXTREMITIES: No LE edema, no calf tenderness  LYMPH: No lymphadenopathy noted  SKIN: No rashes or lesions    Consultant(s) Notes Reviewed:  [x ] YES  [ ] NO  Care Discussed with Consultants/Other Providers [ x] YES  [ ] NO    LABS:                        10.0   10.44 )-----------( 439      ( 12 Sep 2022 05:50 )             29.5     09-12    139  |  105  |  12  ----------------------------<  201<H>  3.5   |  27  |  0.99    Ca    8.4      12 Sep 2022 05:50  Phos  1.5     09-12  Mg     1.8     09-12    TPro  5.6<L>  /  Alb  2.0<L>  /  TBili  0.4  /  DBili  x   /  AST  41<H>  /  ALT  49  /  AlkPhos  91  09-12      CAPILLARY BLOOD GLUCOSE      POCT Blood Glucose.: 271 mg/dL (12 Sep 2022 16:33)  POCT Blood Glucose.: 274 mg/dL (12 Sep 2022 11:29)  POCT Blood Glucose.: 196 mg/dL (12 Sep 2022 07:51)  POCT Blood Glucose.: 305 mg/dL (11 Sep 2022 20:33)            RADIOLOGY & ADDITIONAL TESTS:  no new tests    Imaging Personally Reviewed:  [ ] YES  [ ] NO

## 2022-09-12 NOTE — PROGRESS NOTE ADULT - ASSESSMENT
Patient is a 77y old  Female from home w/ PMHx of HTN, DM, HLD, Hypothyroidism, s/p cholecystectomy, s/p hysterectomy, now presents to the ER for evaluation of generalized weakness, dizziness and dysuria for the past 3 days, watery diarrhea and associated with vomiting  and lower abdominal pain. She has dizziness causing her to fall and hit her face. On admission, she found to have fever, tachycardia, Leukocytosis and positive Urine analysis. The CT abd/pelvis shows  Perinephric edema suggestive of bilateral pyelonephritis and Right inguinal cystic structure with surrounding edema may represent  abscess or infected plug from prior hernia repair. She has started on Ceftriaxone and The ID consult requested to assist with further evaluation and antibiotic management,     # Sepsis ( Fever + tachycardia + Leukocytosis )- resolving  # UTI - WBC >50 /HPF Sq Epi: x / Non Sq Epi: Moderate /HPF / Bacteria: Many /HPF- Urine  Cx  from 9/9 grew  E.coli  # Bilateral pyelonephritis  # Right inguinal suspected abscess- ??  # ESBL  E.coli Bacteremia- 9/7/22 - source most likely - Repeat Blood culture from 9/10/22 is negative to date   # Shiga-like toxin-producing E.coli STEC - detected on GI PCR panel    would recommend:    1. OOB to chair  2. Azithromycin 500 mg X 3 days to cover STEC, until 9/13/22  3. Continue Ertapenem 1 g daily to cover ESBL  E.coli Bacteremia, UTI and Pyelonephritis, until 9/24/22, IF REPEAT CT SCAN OF ABD/PELVIS SHOWS RESOLVEd RIGHT INGUINAL Abscess  4. Monitor kidney function    d/w Dr. Mayen and Covering NPVaibhav    Attending Attestation:    Spent more than 45 minutes on total encounter, more than 50 % of the visit was spent counseling and/or coordinating care by the Attending physician.

## 2022-09-12 NOTE — PROGRESS NOTE ADULT - PROBLEM SELECTOR PLAN 8
likely due to diarrhea  continue to replete  monitor BMP, Mg, phos CTH - neg  CT Maxillofacial sinus - slight L nasal deviation  continue tylenol PRN for pain  f/u outpt with Dr. Varma in 7 -10 days for bone manipulation

## 2022-09-12 NOTE — PROGRESS NOTE ADULT - PROBLEM SELECTOR PLAN 4
p/w BUN/SCr - 28/2.09   likely pre-renal   s/p IV fluids  monitor BMP  avoid nephrotoxic substances  resolved pt p/w multiple episodes of diarrhea with recent abx use outpt  dc contact iso  probiotic TID  plan as above

## 2022-09-12 NOTE — PROGRESS NOTE ADULT - SUBJECTIVE AND OBJECTIVE BOX
Patient is a 77y old  Female who presents with a chief complaint of sepsis 2/2 UTI (12 Sep 2022 08:54)    pt seen in tele [ x ], reg med floor [   ], bed [ x ], chair at bedside [   ], a+o x3 [ x ], lethargic [  ],    nad [ x ]      Allergies    No Known Allergies        Vitals    T(F): 98.2 (09-12-22 @ 07:55), Max: 99.1 (09-11-22 @ 10:47)  HR: 82 (09-12-22 @ 07:55) (82 - 100)  BP: 120/77 (09-12-22 @ 07:55) (120/77 - 156/61)  RR: 18 (09-12-22 @ 07:55) (17 - 19)  SpO2: 97% (09-12-22 @ 07:55) (94% - 99%)  Wt(kg): --  CAPILLARY BLOOD GLUCOSE      POCT Blood Glucose.: 196 mg/dL (12 Sep 2022 07:51)      Labs                          10.0   10.44 )-----------( 439      ( 12 Sep 2022 05:50 )             29.5       09-12    139  |  105  |  12  ----------------------------<  201<H>  3.5   |  27  |  0.99    Ca    8.4      12 Sep 2022 05:50  Phos  1.5     09-12  Mg     1.8     09-12    TPro  5.6<L>  /  Alb  2.0<L>  /  TBili  0.4  /  DBili  x   /  AST  41<H>  /  ALT  49  /  AlkPhos  91  09-12            .Blood Blood  09-10 @ 12:20   No growth to date.  --  --      Catheterized Catheterized  09-09 @ 13:25   >100,000 CFU/ml Escherichia coli ESBL  --  Escherichia coli ESBL      .Stool Other, stool  09-08 @ 14:20   No Protozoa seen by trichrome stain  No Helminths or Protozoa seen in formalin concentrate  performed by iodine stain  (routine O+P not evaluated for Microsporidia,  Cryptosporidia or Cyclospora)  One negative sample does not necessarily rule  out the presence of a parasitic infection.  Moderate White blood cells  --  --      .Blood Blood  09-07 @ 16:57   No growth to date.  --  --      .Blood Blood  09-07 @ 16:50   Growth in aerobic bottle: Escherichia coli ESBL  ***Blood Panel PCR results on this specimen are available  approximately 3 hours after the Gram stain result.***  Gram stain, PCR, and/or culture results may not always  correspond due to difference in methodologies.  ************************************************************  This PCR assay was performed by multiplex PCR. This  Assay tests for 66 bacterial and resistance gene targets.  Please refer to the [facility] test directory  at [email or web address] for details.  --  Blood Culture PCR  Escherichia coli ESBL          Radiology Results      Meds    MEDICATIONS  (STANDING):  azithromycin  IVPB      azithromycin  IVPB 500 milliGRAM(s) IV Intermittent every 24 hours  dextrose 5%. 1000 milliLiter(s) (50 mL/Hr) IV Continuous <Continuous>  dextrose 5%. 1000 milliLiter(s) (100 mL/Hr) IV Continuous <Continuous>  dextrose 50% Injectable 25 Gram(s) IV Push once  dextrose 50% Injectable 12.5 Gram(s) IV Push once  dextrose 50% Injectable 25 Gram(s) IV Push once  ertapenem  IVPB 1000 milliGRAM(s) IV Intermittent every 24 hours  glucagon  Injectable 1 milliGRAM(s) IntraMuscular once  heparin   Injectable 5000 Unit(s) SubCutaneous every 8 hours  insulin lispro (ADMELOG) corrective regimen sliding scale   SubCutaneous three times a day before meals  insulin lispro (ADMELOG) corrective regimen sliding scale   SubCutaneous at bedtime  lactobacillus acidophilus 1 Tablet(s) Oral three times a day  levothyroxine 75 MICROGram(s) Oral daily  metoprolol tartrate 12.5 milliGRAM(s) Oral two times a day  pantoprazole    Tablet 40 milliGRAM(s) Oral before breakfast  potassium phosphate IVPB 30 milliMole(s) IV Intermittent once      MEDICATIONS  (PRN):  acetaminophen     Tablet .. 650 milliGRAM(s) Oral every 6 hours PRN Temp greater or equal to 38C (100.4F), Mild Pain (1 - 3)  dextrose Oral Gel 15 Gram(s) Oral once PRN Blood Glucose LESS THAN 70 milliGRAM(s)/deciliter  ondansetron Injectable 4 milliGRAM(s) IV Push every 8 hours PRN Nausea and/or Vomiting      Physical Exam    Neuro :  no focal deficits  Respiratory: CTA B/L  CV: RRR, S1S2, no murmurs,   Abdominal: Soft, NT, ND +BS,  Extremities: No edema, + peripheral pulses    ASSESSMENT    sepsis,   esbl e coli bacteremia  uti,   electrolyte imbalance,   joseph,   syncope,   s/p fall,   nasal fx,   diarrhea,   c diff r/o,   h/o HTN,   DM,   HLD,   Hypothyroidism,   s/p cholecystectomy,   s/p hysterectomy  Depression      PLAN      d/c tele,   cont bacid tid,   blood cx with esbl e coli noted above   rept blood cx neg noted above  ucx with esbl e coli noted above  id f/u   stool stool studies with Shiga-like toxin-producing E.coli (STEC) stx1/stx2: Detected noted above  Azithromycin 500 mg X 3 days to cover STEC until 9/13/22  Changed Meropenem to Ertapenem 1 g daily to cover ESBL  E.coli until 9/18/22   wbc wnl   stool c diff neg noted    ct abd pelv with Evaluation of the solid organs, vascular structures and GI tract is limited without oral and IV contrast.  Perinephric edema suggestive of bilateral pyelonephritis. Small ascites and small bilateral pleural effusions.  Right inguinal cystic structure with surrounding edema may represent abscess or infected plug from prior hernia repair. If DVT is suspected,   recommend ultrasound Doppler evaluation noted    doppler le b/l with No evidence of deep venous thrombosis in either lower extremity noted    surg f/u   Rt groin mass likely enlarged inguinal Lymph Node, reactive due to undergoing infectious process.   No evidence of abscess.   Recommend follow up US after completion of abx course   cardio f/u noted  echo with EF >70%. Grade I diastolic dysfunction. RV systolic pressure is mildly increased. Trivial pericardial effusion is seen noted above.  serum creat wnl   supplement phos for hypophosphatemia as needed   pulm cons for pulm htn/u   Bronchodilators  CCB.  hgba1c 6.5 noted above  lispro ss,   phys tx eval noted and rec phys tx 2-3x/week x 2 weeks with rolling walker at home with Home PT; with appropriate social support  cont current meds

## 2022-09-12 NOTE — PROGRESS NOTE ADULT - PROBLEM SELECTOR PLAN 11
continue home med lipitor on home med - metformin  a1c 6.5  hold home med  continue ISS  add lantus 10 units  FS ACHS  diabetic diet

## 2022-09-12 NOTE — PROGRESS NOTE ADULT - PROBLEM SELECTOR PLAN 6
Ct a/p noncon-Right inguinal cystic structure with surrounding edema may represent   abscess or infected plug from prior hernia repair.  pending repeat CT a/p with IV constrast to r/o right inguinal abscess  surgery team following  ID Dr. Rodriguez following pt p/w dizziness, syncope and fall  orthostatics now resolved  echo normal EF with G1DD  EKG - abnormal  s/p tele  Cardio Dr. Agrawal following

## 2022-09-12 NOTE — PROGRESS NOTE ADULT - ASSESSMENT
78 y/o F from home w/ PMHx of HTN, DM, HLD, Hypothyroidism, s/p cholecystectomy, s/p hysterectomy. She had been having generalized weakness, dizziness and dysuria for the past 3 days. In the morning of admission, she was in the bathroom for an episode of non-foul smelling watery diarrhea with associated lower abdominal pain. She also had an episode of nonbloody, non-bilous vomiting. Of note, she is also taking multiple antibiotics including Bactrim and Levaquin for UTI, Metronidazole for diarrhea and Fluconazole for urinary candidiasis. Additionally pt had dizziness causing her to fall and hit her face. She developed abrasions on the face near the bridge of the nose.  Admitted to telemetry for concern of syncopal episode. Cardiology Dr. Agrawal following. Echo normal EF with G1DD. CT Head was negative. CT Maxillofacial sinus is negative showing slight L nasal displacement. Pt was also found to have sepsis 2/2 UTI. started on abx, ivf. ID Dr. Rodriguez following. CXR no acute findings. CT a/p noted with possible inguinal cysts v. abscess. Surgery Team following. Pt also had US duplex B/L LE, neg for DVT. Hospital course complicated by ESBL bacteremia, urine culture grew ESBL and enterococcus faecaelis. Repeat blood cultures on 9/10 negative. Pt is pending repeat CT A/P with IV contrast to r/o inguinal abscess.      78 y/o F from home w/ PMHx of HTN, DM, HLD, Hypothyroidism, s/p cholecystectomy, s/p hysterectomy. She had been having generalized weakness, dizziness and dysuria for the past 3 days. In the morning of admission, she was in the bathroom for an episode of non-foul smelling watery diarrhea with associated lower abdominal pain. She also had an episode of nonbloody, non-bilous vomiting. Of note, she is also taking multiple antibiotics including Bactrim and Levaquin for UTI, Metronidazole for diarrhea and Fluconazole for urinary candidiasis. Additionally pt had dizziness causing her to fall and hit her face. She developed abrasions on the face near the bridge of the nose.  Admitted to telemetry for concern of syncopal episode. Cardiology Dr. Agrawal following. Echo normal EF with G1DD. CT Head was negative. CT Maxillofacial sinus is negative showing slight L nasal displacement. Pt was also found to have sepsis 2/2 UTI. started on abx, ivf. ID Dr. Rodriguez following. CXR no acute findings. CT a/p noted with possible inguinal cysts v. abscess. Surgery Team following. Pt also had US duplex B/L LE, neg for DVT. Hospital course complicated by ESBL bacteremia, urine culture grew ESBL and enterococcus faecaelis. Repeat blood cultures on 9/10 negative. Pt is pending repeat CT A/P with IV contrast to r/o inguinal abscess. Son updated on pt progress.

## 2022-09-12 NOTE — PROGRESS NOTE ADULT - PROBLEM SELECTOR PLAN 2
CT a/p shows bilateral pyelo  continue tylenol PRN for pain  plan as above initial blood cultures positive E.coli ESBL on 9/7  repeat blood cultures on 9/10 neg  likely due to UTI  leukocytosis improving, no fevers  plan as above

## 2022-09-12 NOTE — PROGRESS NOTE ADULT - PROBLEM SELECTOR PLAN 7
CTH - neg  CT Maxillofacial sinus - slight L nasal deviation  continue tylenol PRN for pain  f/u outpt with Dr. Varma in 7 -10 days for bone manipulation Ct a/p noncon-Right inguinal cystic structure with surrounding edema may represent   abscess or infected plug from prior hernia repair.  pending repeat CT a/p with IV constrast to r/o right inguinal abscess  surgery team following  ID Dr. Rodriguez following

## 2022-09-12 NOTE — PROGRESS NOTE ADULT - SUBJECTIVE AND OBJECTIVE BOX
CHIEF COMPLAINT:Patient is a 77y old  Female who presents with a chief complaint of sepsis 2/2 UTI.Pt appears comfortable,+diarrhea    	  REVIEW OF SYSTEMS:  CONSTITUTIONAL: No fever, weight loss, or fatigue  EYES: No eye pain, visual disturbances, or discharge  ENT:  No difficulty hearing, tinnitus, vertigo; No sinus or throat pain  NECK: No pain or stiffness  RESPIRATORY: No cough, wheezing, chills or hemoptysis; No Shortness of Breath  CARDIOVASCULAR: No chest pain, palpitations, passing out, dizziness, or leg swelling  GASTROINTESTINAL: No abdominal or epigastric pain. No nausea, vomiting, or hematemesis; + diarrhea No constipation. No melena or hematochezia.  GENITOURINARY: No dysuria, frequency, hematuria, or incontinence  NEUROLOGICAL: No headaches, memory loss, loss of strength, numbness, or tremors  SKIN: No itching, burning, rashes, or lesions   LYMPH Nodes: No enlarged glands  ENDOCRINE: No heat or cold intolerance; No hair loss  MUSCULOSKELETAL: No joint pain or swelling; No muscle, back, or extremity pain  PSYCHIATRIC: No depression, anxiety, mood swings, or difficulty sleeping  HEME/LYMPH: No easy bruising, or bleeding gums  ALLERGY AND IMMUNOLOGIC: No hives or eczema	        PHYSICAL EXAM:  T(C): 36.8 (09-12-22 @ 07:55), Max: 37.3 (09-11-22 @ 10:47)  HR: 82 (09-12-22 @ 07:55) (82 - 100)  BP: 120/77 (09-12-22 @ 07:55) (120/77 - 156/61)  RR: 18 (09-12-22 @ 07:55) (17 - 19)  SpO2: 97% (09-12-22 @ 07:55) (94% - 99%)  Wt(kg): --  I&O's Summary    11 Sep 2022 07:01  -  12 Sep 2022 07:00  --------------------------------------------------------  IN: 250 mL / OUT: 0 mL / NET: 250 mL    Tele-nsr    Appearance: Normal	  HEENT:   Normal oral mucosa, PERRL, EOMI	  Lymphatic: No lymphadenopathy  Cardiovascular: Normal S1 S2, No JVD, No murmurs, No edema  Respiratory: Lungs clear to auscultation	  Psychiatry: A & O x 3, Mood & affect appropriate  Gastrointestinal:  Soft, Non-tender, + BS	  Skin: No rashes, No ecchymoses, No cyanosis	  Neurologic: Non-focal  Extremities: Normal range of motion, No clubbing, cyanosis or edema  Vascular: Peripheral pulses palpable 2+ bilaterally    MEDICATIONS  (STANDING):  azithromycin  IVPB      azithromycin  IVPB 500 milliGRAM(s) IV Intermittent every 24 hours  dextrose 5%. 1000 milliLiter(s) (50 mL/Hr) IV Continuous <Continuous>  dextrose 5%. 1000 milliLiter(s) (100 mL/Hr) IV Continuous <Continuous>  dextrose 50% Injectable 25 Gram(s) IV Push once  dextrose 50% Injectable 12.5 Gram(s) IV Push once  dextrose 50% Injectable 25 Gram(s) IV Push once  ertapenem  IVPB 1000 milliGRAM(s) IV Intermittent every 24 hours  glucagon  Injectable 1 milliGRAM(s) IntraMuscular once  heparin   Injectable 5000 Unit(s) SubCutaneous every 8 hours  insulin lispro (ADMELOG) corrective regimen sliding scale   SubCutaneous three times a day before meals  insulin lispro (ADMELOG) corrective regimen sliding scale   SubCutaneous at bedtime  lactobacillus acidophilus 1 Tablet(s) Oral three times a day  levothyroxine 75 MICROGram(s) Oral daily  metoprolol tartrate 12.5 milliGRAM(s) Oral two times a day  pantoprazole    Tablet 40 milliGRAM(s) Oral before breakfast  potassium phosphate IVPB 30 milliMole(s) IV Intermittent once        	  LABS:	 	                        10.0   10.44 )-----------( 439      ( 12 Sep 2022 05:50 )             29.5     09-12    139  |  105  |  12  ----------------------------<  201<H>  3.5   |  27  |  0.99    Ca    8.4      12 Sep 2022 05:50  Phos  1.5     09-12  Mg     1.8     09-12    TPro  5.6<L>  /  Alb  2.0<L>  /  TBili  0.4  /  DBili  x   /  AST  41<H>  /  ALT  49  /  AlkPhos  91  09-12    proBNP: Serum Pro-Brain Natriuretic Peptide: 903 pg/mL (09-07 @ 16:10)    Lipid Profile:   HgA1c:   TSH: Thyroid Stimulating Hormone, Serum: 2.00 uU/mL (09-09 @ 05:27)

## 2022-09-12 NOTE — PROGRESS NOTE ADULT - SUBJECTIVE AND OBJECTIVE BOX
Patient is seen and examined at the bed side, is afebrile. She mentioned feeling better. The Leukocytosis trended down to normal.       REVIEW OF SYSTEMS: All other review systems are negative      ALLERGIES: No Known Allergies      Vital Signs Last 24 Hrs  T(C): 36.8 (12 Sep 2022 11:07), Max: 37.1 (11 Sep 2022 19:47)  T(F): 98.2 (12 Sep 2022 11:07), Max: 98.8 (11 Sep 2022 19:47)  HR: 92 (12 Sep 2022 11:07) (82 - 100)  BP: 154/91 (12 Sep 2022 11:07) (120/77 - 156/61)  BP(mean): --  RR: 18 (12 Sep 2022 11:07) (18 - 19)  SpO2: 97% (12 Sep 2022 11:07) (94% - 98%)    Parameters below as of 12 Sep 2022 11:07  Patient On (Oxygen Delivery Method): room air        PHYSICAL EXAM:  GENERAL: Not in distress   CHEST/LUNG: Not using accessory muscles   HEART: s1 and s2 present  ABDOMEN:  Nontender and  Nondistended  EXTREMITIES: No pedal  edema  CNS: Awake and Alert      LABS:                        10.0   10.44 )-----------( 439      ( 12 Sep 2022 05:50 )             29.5                         11.0   16.66 )-----------( 269      ( 08 Sep 2022 05:30 )             30.9       12    139  |  105  |  12  ----------------------------<  201<H>  3.5   |  27  |  0.99    Ca    8.4      12 Sep 2022 05:50  Phos  1.5       Mg     1.8     12    TPro  5.6<L>  /  Alb  2.0<L>  /  TBili  0.4  /  DBili  x   /  AST  41<H>  /  ALT  49  /  AlkPhos  91  12    09-10    136  |  104  |  9   ----------------------------<  193<H>  3.5   |  21<L>  |  0.97    Ca    7.6<L>      10 Sep 2022 05:03  Phos  1.5     -10  Mg     1.8     -10    TPro  5.8<L>  /  Alb  2.3<L>  /  TBili  0.4  /  DBili  x   /  AST  17  /  ALT  15  /  AlkPhos  55          CAPILLARY BLOOD GLUCOSE  POCT Blood Glucose.: 235 mg/dL (08 Sep 2022 09:27)  POCT Blood Glucose.: 301 mg/dL (07 Sep 2022 13:56)        Urinalysis Basic - ( 07 Sep 2022 19:20 )  Color: Yellow / Appearance: Slightly Turbid / S.020 / pH: x  Gluc: x / Ketone: Negative  / Bili: Negative / Urobili: Negative   Blood: x / Protein: 100 / Nitrite: Negative   Leuk Esterase: Moderate / RBC: 5-10 /HPF / WBC >50 /HPF   Sq Epi: x / Non Sq Epi: Moderate /HPF / Bacteria: Many /HPF        MEDICATIONS  (STANDING):  azithromycin  IVPB      azithromycin  IVPB 500 milliGRAM(s) IV Intermittent every 24 hours  ertapenem  IVPB 1000 milliGRAM(s) IV Intermittent every 24 hours  glucagon  Injectable 1 milliGRAM(s) IntraMuscular once  heparin   Injectable 5000 Unit(s) SubCutaneous every 8 hours  insulin lispro (ADMELOG) corrective regimen sliding scale   SubCutaneous three times a day before meals  insulin lispro (ADMELOG) corrective regimen sliding scale   SubCutaneous at bedtime  lactobacillus acidophilus 1 Tablet(s) Oral three times a day  levothyroxine 75 MICROGram(s) Oral daily  metoprolol tartrate 12.5 milliGRAM(s) Oral two times a day  pantoprazole    Tablet 40 milliGRAM(s) Oral before breakfast    RADIOLOGY & ADDITIONAL TESTS:    22 : US Duplex Venous Lower Ext Complete, Bilateral (22 @ 11:57) No evidence of deep venous thrombosis in either lower extremity.      22: CT Abdomen and Pelvis No Cont (22 @ 09:14) >Evaluation of the solid organs, vascular structures and GI tract is   limited without oral and IV contrast.  Perinephric edema suggestive of bilateral pyelonephritis. Small ascites  and small bilateral pleural effusions.    Right inguinal cystic structure with surrounding edema may represent  abscess or infected plug from prior hernia repair. If DVT is suspected,   recommend ultrasound Doppler evaluation.    Findings were discussed with LUIS DANIEL DODGE [id number] 2022 10:57  AM by Dr. Brianna Roy with read back confirmation.      22 : Xray Chest 1 View- PORTABLE-Urgent (22 @ 17:02) IMPRESSION: No acute finding or change.      MICROBIOLOGY  DATA:    Culture - Blood (09.10.22 @ 12:20)   Specimen Source: .Blood Blood   Culture Results: No growth to date.     Culture - Urine (22 @ 13:25)   Specimen Source: Catheterized Catheterized   Culture Results: >100,000 CFU/ml Escherichia coli     GI PCR Panel Stool (22 @ 19:20)   GI PCR Panel: Detected:   Shiga-like toxin-producing E.coli (STEC) stx1/stx2: Detected     Clostridium difficile Toxin by PCR (22 @ 12:30)   C Diff by PCR Result: NotDetec     Culture - Blood (22 @ 16:57)   Specimen Source: .Blood Blood   Culture Results: No growth to date.     Culture - Blood (22 @ 16:50)   - ESBL: Detec   - Escherichia coli: Detec   - CTX-M Resistance Marker: Detec   - Trimethoprim/Sulfamethoxazole: R >2/   Gram Stain:   Growth in aerobic bottle: Gram Negative Rods   - Amikacin: S <=16   - Ampicillin: R >16 These ampicillin results predict results for amoxicillin   - Ampicillin/Sulbactam: R >16/8 Enterobacter, Klebsiella aerogenes, Citrobacter, and Serratia may develop resistance during prolonged therapy (3-4 days)   - Aztreonam: R >16   - Cefazolin: R >16 Enterobacter, Klebsiella aerogenes, Citrobacter, and Serratia may develop resistance during prolonged therapy (3-4 days)   - Cefepime: R >16   - Ceftriaxone: R >32 Enterobacter, Klebsiella aerogenes, Citrobacter, and Serratia may develop resistance during prolonged therapy   - Ciprofloxacin: R >2   - Ertapenem: S <=0.5   - Gentamicin: R >8   - Imipenem: S <=1   - Levofloxacin: R >4   - Meropenem: S <=1   - Piperacillin/Tazobactam: R <=8   - Tobramycin: R >8   Specimen Source: .Blood Blood   Organism: Blood Culture PCR   Organism: Escherichia coli ESBL   Culture Results:   Growth in aerobic bottle: Escherichia coli ESBL   Rapid HIV-1/2 Antibody (22 @ 16:10)   Rapid HIV-1/2 Antibody: Nonreact:     COVID-19 PCR (22 @ 16:10)   COVID-19 PCR: NotDetec:

## 2022-09-12 NOTE — PROGRESS NOTE ADULT - PROBLEM SELECTOR PLAN 1
UA+  WBC - 21, Lactate - 3.2  urine culture grew e.coli ESBL and enterococcus faecalis  initial blood cultures on 9/7 grew e.coli ESBL  s/p Rocephin, Azithromycin, ceftriaxone and flagyl  continue azithromycin and ertapenem  repeat blood cultures negative  monitor cbc  ID Dr. Rodriguez following

## 2022-09-12 NOTE — PROGRESS NOTE ADULT - PROBLEM SELECTOR PLAN 5
pt p/w dizziness, syncope and fall  orthostatics now resolved  echo normal EF with G1DD  EKG - abnormal  s/p tele  Cardio Dr. Agrawal following p/w BUN/SCr - 28/2.09   likely pre-renal   s/p IV fluids  monitor BMP  avoid nephrotoxic substances  resolved

## 2022-09-12 NOTE — PROGRESS NOTE ADULT - PROBLEM SELECTOR PLAN 10
on home med - metformin  a1c 6.5  hold home med  continue ISS  add lantus 10 units  FS ACHS  diabetic diet on home meds - amlodipine, losartan, lopressor  BP trending up due to home meds held in setting of joseph  restart home med amlodipine, losartan  goal <140/90

## 2022-09-12 NOTE — PROGRESS NOTE ADULT - PROBLEM SELECTOR PLAN 3
pt p/w multiple episodes of diarrhea with recent abx use outpt  c/w c diff isolation  probiotic TID  plan as above CT a/p shows bilateral pyelo  continue tylenol PRN for pain  plan as above

## 2022-09-12 NOTE — PROGRESS NOTE ADULT - SUBJECTIVE AND OBJECTIVE BOX
Patient is a 77y old  Female who presents with a chief complaint of sepsis 2/2 UTI (12 Sep 2022 09:03)  Patient is awake, alert, not in acute distress on room air. She complains of cough, epigastric pain and diarrhea. repeat CXR is planned.     INTERVAL HPI/OVERNIGHT EVENTS:      VITAL SIGNS:  T(F): 98.2 (09-12-22 @ 07:55)  HR: 82 (09-12-22 @ 07:55)  BP: 120/77 (09-12-22 @ 07:55)  RR: 18 (09-12-22 @ 07:55)  SpO2: 97% (09-12-22 @ 07:55)  Wt(kg): --  I&O's Detail    11 Sep 2022 07:01  -  12 Sep 2022 07:00  --------------------------------------------------------  IN:    IV PiggyBack: 50 mL    Oral Fluid: 200 mL  Total IN: 250 mL    OUT:  Total OUT: 0 mL    Total NET: 250 mL              REVIEW OF SYSTEMS:    CONSTITUTIONAL:  No fevers, chills, sweats    HEENT:  Eyes:  No diplopia or blurred vision. ENT:  No earache, sore throat or runny nose.    CARDIOVASCULAR:  No pressure, squeezing, tightness, or heaviness about the chest; no palpitations.    RESPIRATORY:  Per HPI    GASTROINTESTINAL:  No abdominal pain, nausea, vomiting or diarrhea.    GENITOURINARY:  No dysuria, frequency or urgency.    NEUROLOGIC:  No paresthesias, fasciculations, seizures or weakness.    PSYCHIATRIC:  No disorder of thought or mood.      PHYSICAL EXAM:    Constitutional: Well developed and nourished  Eyes:Perrla  ENMT: normal  Neck:supple  Respiratory: good air entry  Cardiovascular: S1 S2 regular  Gastrointestinal: Soft, Non tender  Extremities: No edema  Vascular:normal  Neurological:Awake, alert,Ox3  Musculoskeletal:Normal      MEDICATIONS  (STANDING):  azithromycin  IVPB      azithromycin  IVPB 500 milliGRAM(s) IV Intermittent every 24 hours  dextrose 5%. 1000 milliLiter(s) (50 mL/Hr) IV Continuous <Continuous>  dextrose 5%. 1000 milliLiter(s) (100 mL/Hr) IV Continuous <Continuous>  dextrose 50% Injectable 25 Gram(s) IV Push once  dextrose 50% Injectable 12.5 Gram(s) IV Push once  dextrose 50% Injectable 25 Gram(s) IV Push once  ertapenem  IVPB 1000 milliGRAM(s) IV Intermittent every 24 hours  glucagon  Injectable 1 milliGRAM(s) IntraMuscular once  heparin   Injectable 5000 Unit(s) SubCutaneous every 8 hours  insulin lispro (ADMELOG) corrective regimen sliding scale   SubCutaneous three times a day before meals  insulin lispro (ADMELOG) corrective regimen sliding scale   SubCutaneous at bedtime  lactobacillus acidophilus 1 Tablet(s) Oral three times a day  levothyroxine 75 MICROGram(s) Oral daily  metoprolol tartrate 12.5 milliGRAM(s) Oral two times a day  pantoprazole    Tablet 40 milliGRAM(s) Oral before breakfast    MEDICATIONS  (PRN):  acetaminophen     Tablet .. 650 milliGRAM(s) Oral every 6 hours PRN Temp greater or equal to 38C (100.4F), Mild Pain (1 - 3)  dextrose Oral Gel 15 Gram(s) Oral once PRN Blood Glucose LESS THAN 70 milliGRAM(s)/deciliter  ondansetron Injectable 4 milliGRAM(s) IV Push every 8 hours PRN Nausea and/or Vomiting      Allergies    No Known Allergies    Intolerances        LABS:                        10.0   10.44 )-----------( 439      ( 12 Sep 2022 05:50 )             29.5     09-12    139  |  105  |  12  ----------------------------<  201<H>  3.5   |  27  |  0.99    Ca    8.4      12 Sep 2022 05:50  Phos  1.5     09-12  Mg     1.8     09-12    TPro  5.6<L>  /  Alb  2.0<L>  /  TBili  0.4  /  DBili  x   /  AST  41<H>  /  ALT  49  /  AlkPhos  91  09-12              CAPILLARY BLOOD GLUCOSE      POCT Blood Glucose.: 196 mg/dL (12 Sep 2022 07:51)  POCT Blood Glucose.: 305 mg/dL (11 Sep 2022 20:33)  POCT Blood Glucose.: 223 mg/dL (11 Sep 2022 16:27)  POCT Blood Glucose.: 275 mg/dL (11 Sep 2022 11:28)    pro-bnp 903 09-07 @ 16:10     d-dimer --  09-07 @ 16:10      RADIOLOGY & ADDITIONAL TESTS:    CXR:  POCT Blood Glucose.: 196 mg/dL (09.12.22 @ 07:51)   Red Cell Morphology: Abnormal (09.12.22 @ 05:50)   Hemoglobin: 10.0 g/dL (09.12.22 @ 05:50)   Calcium, Total Serum: 8.4 mg/dL (09.12.22 @ 05:50)   Magnesium, Serum: 1.8 mg/dL (09.12.22 @ 05:50)   Phosphorus Level, Serum: 1.5 mg/dL (09.12.22 @ 05:50)   Ct scan chest:    ekg;    echo:

## 2022-09-12 NOTE — PROGRESS NOTE ADULT - ASSESSMENT
76 y/o F from home w/ PMHx of HTN, DM, HLD, Hypothyroidism, s/p cholecystectomy, s/p hysterectomy. She had been having generalized weakness, dizziness and dysuria for the past 3 days,diarrhea,CALLY,UTI,abnormal EKG,E coli bacteremia.  1.D/C Tele monitoring.  2.Diarrhea-E coli bacteremia-ABX.  3.CALLY-resolved  4.Repeat blood cx-negative.  5.Hypothyroidism-synthroid.  6.DM-Insulin.  7.GI and DVT prophylaxis.

## 2022-09-12 NOTE — PROGRESS NOTE ADULT - PROBLEM SELECTOR PLAN 9
on home meds - amlodipine, losartan, lopressor  BP trending up due to home meds held in setting of joseph  restart home med amlodipine, losartan  goal <140/90 likely due to diarrhea  continue to replete  monitor BMP, Mg, phos

## 2022-09-13 ENCOUNTER — TRANSCRIPTION ENCOUNTER (OUTPATIENT)
Age: 77
End: 2022-09-13

## 2022-09-13 LAB
CULTURE RESULTS: SIGNIFICANT CHANGE UP
GLUCOSE BLDC GLUCOMTR-MCNC: 223 MG/DL — HIGH (ref 70–99)
GLUCOSE BLDC GLUCOMTR-MCNC: 226 MG/DL — HIGH (ref 70–99)
GLUCOSE BLDC GLUCOMTR-MCNC: 230 MG/DL — HIGH (ref 70–99)
GLUCOSE BLDC GLUCOMTR-MCNC: 355 MG/DL — HIGH (ref 70–99)
SPECIMEN SOURCE: SIGNIFICANT CHANGE UP

## 2022-09-13 RX ORDER — INSULIN LISPRO 100/ML
3 VIAL (ML) SUBCUTANEOUS
Refills: 0 | Status: DISCONTINUED | OUTPATIENT
Start: 2022-09-13 | End: 2022-09-15

## 2022-09-13 RX ORDER — LANOLIN ALCOHOL/MO/W.PET/CERES
3 CREAM (GRAM) TOPICAL AT BEDTIME
Refills: 0 | Status: DISCONTINUED | OUTPATIENT
Start: 2022-09-13 | End: 2022-09-15

## 2022-09-13 RX ADMIN — Medication 3 MILLIGRAM(S): at 21:47

## 2022-09-13 RX ADMIN — HEPARIN SODIUM 5000 UNIT(S): 5000 INJECTION INTRAVENOUS; SUBCUTANEOUS at 05:40

## 2022-09-13 RX ADMIN — LOSARTAN POTASSIUM 100 MILLIGRAM(S): 100 TABLET, FILM COATED ORAL at 05:40

## 2022-09-13 RX ADMIN — Medication 1 TABLET(S): at 14:28

## 2022-09-13 RX ADMIN — Medication 2: at 11:59

## 2022-09-13 RX ADMIN — Medication 2: at 08:37

## 2022-09-13 RX ADMIN — Medication 1 TABLET(S): at 05:40

## 2022-09-13 RX ADMIN — ERTAPENEM SODIUM 120 MILLIGRAM(S): 1 INJECTION, POWDER, LYOPHILIZED, FOR SOLUTION INTRAMUSCULAR; INTRAVENOUS at 19:17

## 2022-09-13 RX ADMIN — Medication 2: at 16:47

## 2022-09-13 RX ADMIN — Medication 75 MICROGRAM(S): at 05:40

## 2022-09-13 RX ADMIN — HEPARIN SODIUM 5000 UNIT(S): 5000 INJECTION INTRAVENOUS; SUBCUTANEOUS at 21:18

## 2022-09-13 RX ADMIN — Medication 12.5 MILLIGRAM(S): at 05:41

## 2022-09-13 RX ADMIN — HEPARIN SODIUM 5000 UNIT(S): 5000 INJECTION INTRAVENOUS; SUBCUTANEOUS at 14:28

## 2022-09-13 RX ADMIN — INSULIN GLARGINE 10 UNIT(S): 100 INJECTION, SOLUTION SUBCUTANEOUS at 21:16

## 2022-09-13 RX ADMIN — Medication 3: at 21:16

## 2022-09-13 RX ADMIN — PANTOPRAZOLE SODIUM 40 MILLIGRAM(S): 20 TABLET, DELAYED RELEASE ORAL at 05:40

## 2022-09-13 RX ADMIN — Medication 12.5 MILLIGRAM(S): at 17:13

## 2022-09-13 RX ADMIN — AMLODIPINE BESYLATE 5 MILLIGRAM(S): 2.5 TABLET ORAL at 05:40

## 2022-09-13 RX ADMIN — AZITHROMYCIN 255 MILLIGRAM(S): 500 TABLET, FILM COATED ORAL at 19:17

## 2022-09-13 RX ADMIN — Medication 1 TABLET(S): at 21:17

## 2022-09-13 RX ADMIN — Medication 650 MILLIGRAM(S): at 21:17

## 2022-09-13 NOTE — PROGRESS NOTE ADULT - SUBJECTIVE AND OBJECTIVE BOX
Patient is seen and examined at the bed side, is afebrile. She is tolerating Abx well and mentioned feeling better.       REVIEW OF SYSTEMS: All other review systems are negative      ALLERGIES: No Known Allergies      Vital Signs Last 24 Hrs  T(C): 36.6 (13 Sep 2022 16:12), Max: 37.3 (13 Sep 2022 07:48)  T(F): 97.9 (13 Sep 2022 16:12), Max: 99.1 (13 Sep 2022 07:48)  HR: 90 (13 Sep 2022 16:12) (63 - 94)  BP: 155/63 (13 Sep 2022 16:12) (129/55 - 155/63)  BP(mean): 74 (13 Sep 2022 07:48) (74 - 74)  RR: 18 (13 Sep 2022 16:12) (18 - 19)  SpO2: 97% (13 Sep 2022 16:12) (95% - 97%)    Parameters below as of 13 Sep 2022 16:12  Patient On (Oxygen Delivery Method): room air        PHYSICAL EXAM:  GENERAL: Not in distress   CHEST/LUNG: Not using accessory muscles   HEART: s1 and s2 present  ABDOMEN:  Nontender and  Nondistended  EXTREMITIES: No pedal  edema  CNS: Awake and Alert      LABS: No new labs                        10.0   10.44 )-----------( 439      ( 12 Sep 2022 05:50 )             29.5                         11.0   16.66 )-----------( 269      ( 08 Sep 2022 05:30 )             30.9       09-12    139  |  105  |  12  ----------------------------<  201<H>  3.5   |  27  |  0.99    Ca    8.4      12 Sep 2022 05:50  Phos  1.5     -12  Mg     1.8     -12    TPro  5.6<L>  /  Alb  2.0<L>  /  TBili  0.4  /  DBili  x   /  AST  41<H>  /  ALT  49  /  AlkPhos  91  -12      09-10    136  |  104  |  9   ----------------------------<  193<H>  3.5   |  21<L>  |  0.97    Ca    7.6<L>      10 Sep 2022 05:03  Phos  1.5     09-10  Mg     1.8     09-10    TPro  5.8<L>  /  Alb  2.3<L>  /  TBili  0.4  /  DBili  x   /  AST  17  /  ALT  15  /  AlkPhos  55  -        CAPILLARY BLOOD GLUCOSE  POCT Blood Glucose.: 235 mg/dL (08 Sep 2022 09:27)  POCT Blood Glucose.: 301 mg/dL (07 Sep 2022 13:56)        Urinalysis Basic - ( 07 Sep 2022 19:20 )  Color: Yellow / Appearance: Slightly Turbid / S.020 / pH: x  Gluc: x / Ketone: Negative  / Bili: Negative / Urobili: Negative   Blood: x / Protein: 100 / Nitrite: Negative   Leuk Esterase: Moderate / RBC: 5-10 /HPF / WBC >50 /HPF   Sq Epi: x / Non Sq Epi: Moderate /HPF / Bacteria: Many /HPF        MEDICATIONS  (STANDING):    amLODIPine   Tablet 5 milliGRAM(s) Oral daily  azithromycin  IVPB      azithromycin  IVPB 500 milliGRAM(s) IV Intermittent every 24 hours  ertapenem  IVPB 1000 milliGRAM(s) IV Intermittent every 24 hours  glucagon  Injectable 1 milliGRAM(s) IntraMuscular once  heparin   Injectable 5000 Unit(s) SubCutaneous every 8 hours  insulin glargine Injectable (LANTUS) 10 Unit(s) SubCutaneous at bedtime  insulin lispro (ADMELOG) corrective regimen sliding scale   SubCutaneous three times a day before meals  insulin lispro (ADMELOG) corrective regimen sliding scale   SubCutaneous at bedtime  lactobacillus acidophilus 1 Tablet(s) Oral three times a day  levothyroxine 75 MICROGram(s) Oral daily  losartan 100 milliGRAM(s) Oral daily  metoprolol tartrate 12.5 milliGRAM(s) Oral two times a day  pantoprazole    Tablet 40 milliGRAM(s) Oral before breakfast      RADIOLOGY & ADDITIONAL TESTS:      22 : US Duplex Venous Lower Ext Complete, Bilateral (22 @ 11:57) No evidence of deep venous thrombosis in either lower extremity.      22: CT Abdomen and Pelvis No Cont (22 @ 09:14) >Evaluation of the solid organs, vascular structures and GI tract is   limited without oral and IV contrast.  Perinephric edema suggestive of bilateral pyelonephritis. Small ascites  and small bilateral pleural effusions.    Right inguinal cystic structure with surrounding edema may represent  abscess or infected plug from prior hernia repair. If DVT is suspected,   recommend ultrasound Doppler evaluation.    Findings were discussed with LUIS DANIEL DODGE [id number] 2022 10:57  AM by Dr. Brianna Roy with read back confirmation.      22 : Xray Chest 1 View- PORTABLE-Urgent (22 @ 17:02) IMPRESSION: No acute finding or change.      MICROBIOLOGY  DATA:    Culture - Blood (09.10.22 @ 12:20)   Specimen Source: .Blood Blood   Culture Results: No growth to date.     Culture - Urine (22 @ 13:25)   Specimen Source: Catheterized Catheterized   Culture Results: >100,000 CFU/ml Escherichia coli     GI PCR Panel Stool (22 @ 19:20)   GI PCR Panel: Detected:   Shiga-like toxin-producing E.coli (STEC) stx1/stx2: Detected     Clostridium difficile Toxin by PCR (22 @ 12:30)   C Diff by PCR Result: NotDetec     Culture - Blood (22 @ 16:57)   Specimen Source: .Blood Blood   Culture Results: No growth to date.     Culture - Blood (22 @ 16:50)   - ESBL: Detec   - Escherichia coli: Detec   - CTX-M Resistance Marker: Detec   - Trimethoprim/Sulfamethoxazole: R >2/38   Gram Stain:   Growth in aerobic bottle: Gram Negative Rods   - Amikacin: S <=16   - Ampicillin: R >16 These ampicillin results predict results for amoxicillin   - Ampicillin/Sulbactam: R >16/8 Enterobacter, Klebsiella aerogenes, Citrobacter, and Serratia may develop resistance during prolonged therapy (3-4 days)   - Aztreonam: R >16   - Cefazolin: R >16 Enterobacter, Klebsiella aerogenes, Citrobacter, and Serratia may develop resistance during prolonged therapy (3-4 days)   - Cefepime: R >16   - Ceftriaxone: R >32 Enterobacter, Klebsiella aerogenes, Citrobacter, and Serratia may develop resistance during prolonged therapy   - Ciprofloxacin: R >2   - Ertapenem: S <=0.5   - Gentamicin: R >8   - Imipenem: S <=1   - Levofloxacin: R >4   - Meropenem: S <=1   - Piperacillin/Tazobactam: R <=8   - Tobramycin: R >8   Specimen Source: .Blood Blood   Organism: Blood Culture PCR   Organism: Escherichia coli ESBL   Culture Results:   Growth in aerobic bottle: Escherichia coli ESBL   Rapid HIV-1/2 Antibody (22 @ 16:10)   Rapid HIV-1/2 Antibody: Nonreact:     COVID-19 PCR (22 @ 16:10)   COVID-19 PCR: NotDetec:

## 2022-09-13 NOTE — PROGRESS NOTE ADULT - PROBLEM SELECTOR PLAN 1
UA+  WBC - 21, Lactate - 3.2  urine culture grew e.coli ESBL and enterococcus faecalis  initial blood cultures on 9/7 grew e.coli ESBL  s/p Rocephin, Azithromycin, ceftriaxone and flagyl  continue ertapenem until 9/24  repeat blood cultures negative  monitor cbc  ID Dr. Rodriguez following

## 2022-09-13 NOTE — PROGRESS NOTE ADULT - PROBLEM SELECTOR PLAN 4
pt p/w multiple episodes of diarrhea with recent abx use outpt  dc contact iso  probiotic TID  plan as above

## 2022-09-13 NOTE — PROGRESS NOTE ADULT - ASSESSMENT
76 y/o F from home w/ PMHx of HTN, DM, HLD, Hypothyroidism, s/p cholecystectomy, s/p hysterectomy. She had been having generalized weakness, dizziness and dysuria for the past 3 days. In the morning of admission, she was in the bathroom for an episode of non-foul smelling watery diarrhea with associated lower abdominal pain. She also had an episode of nonbloody, non-bilous vomiting. Of note, she is also taking multiple antibiotics including Bactrim and Levaquin for UTI, Metronidazole for diarrhea and Fluconazole for urinary candidiasis. Additionally pt had dizziness causing her to fall and hit her face. She developed abrasions on the face near the bridge of the nose.  Admitted to telemetry for concern of syncopal episode. Cardiology Dr. Agrawal following. Echo normal EF with G1DD. CT Head was negative. CT Maxillofacial sinus is negative showing slight L nasal displacement. Pt was also found to have sepsis 2/2 UTI. started on abx, ivf. ID Dr. Rodriguez following. CXR no acute findings. CT a/p noted with possible inguinal cysts v. abscess. Surgery Team following. Pt also had US duplex B/L LE, neg for DVT. Hospital course complicated by ESBL bacteremia, urine culture grew ESBL and enterococcus faecaelis. Repeat blood cultures on 9/10 negative. Pt is pending repeat CT A/P with IV contrast to r/o inguinal abscess. Pt is also pending extended dwell for IV Ertapenem until 9/24.

## 2022-09-13 NOTE — DISCHARGE NOTE NURSING/CASE MANAGEMENT/SOCIAL WORK - PATIENT PORTAL LINK FT
You can access the FollowMyHealth Patient Portal offered by [facility] by registering at the following website: [email or web address]. By joining [employer]’s FollowMyHealth portal, you will also be able to view your health information using other applications (apps) compatible with our system.

## 2022-09-13 NOTE — PROGRESS NOTE ADULT - PROBLEM SELECTOR PLAN 5
p/w BUN/SCr - 28/2.09   likely pre-renal   s/p IV fluids  monitor BMP  avoid nephrotoxic substances  resolved

## 2022-09-13 NOTE — PROGRESS NOTE ADULT - PROBLEM SELECTOR PLAN 11
on home med - metformin  a1c 6.5  hold home med  continue ISS  continue lantus 10 units  add 3 units admelog TID AC  FS ACHS  diabetic diet

## 2022-09-13 NOTE — DISCHARGE NOTE NURSING/CASE MANAGEMENT/SOCIAL WORK - NSDCPEFALRISK_GEN_ALL_CORE
For information on Fall & Injury Prevention, visit: [email or web address] OR  [email or web address] OR  https://www.cdc.gov/steadi/patient.html

## 2022-09-13 NOTE — PROGRESS NOTE ADULT - SUBJECTIVE AND OBJECTIVE BOX
Patient is a 77y old  Female who presents with a chief complaint of sepsis 2/2 UTI (13 Sep 2022 09:40)  Patient is awake, alert, out of bed to chair, not in acute distress on RA.    INTERVAL HPI/OVERNIGHT EVENTS:      VITAL SIGNS:  T(F): 99.1 (09-13-22 @ 07:48)  HR: 80 (09-13-22 @ 07:48)  BP: 129/55 (09-13-22 @ 07:48)  RR: 18 (09-13-22 @ 07:48)  SpO2: 95% (09-13-22 @ 07:48)  Wt(kg): --  I&O's Detail    12 Sep 2022 07:01  -  13 Sep 2022 07:00  --------------------------------------------------------  IN:    Oral Fluid: 450 mL  Total IN: 450 mL    OUT:    Voided (mL): 50 mL  Total OUT: 50 mL    Total NET: 400 mL              REVIEW OF SYSTEMS:    CONSTITUTIONAL:  No fevers, chills, sweats    HEENT:  Eyes:  No diplopia or blurred vision. ENT:  No earache, sore throat or runny nose.    CARDIOVASCULAR:  No pressure, squeezing, tightness, or heaviness about the chest; no palpitations.    RESPIRATORY:  Per HPI    GASTROINTESTINAL:  No abdominal pain, nausea, vomiting or diarrhea.    GENITOURINARY:  No dysuria, frequency or urgency.    NEUROLOGIC:  No paresthesias, fasciculations, seizures or weakness.    PSYCHIATRIC:  No disorder of thought or mood.      PHYSICAL EXAM:    Constitutional: Well developed and nourished  Eyes:Perrla  ENMT: normal  Neck:supple  Respiratory: good air entry  Cardiovascular: S1 S2 regular  Gastrointestinal: Soft, Non tender  Extremities: No edema  Vascular:normal  Neurological:Awake, alert,Ox3  Musculoskeletal:Normal      MEDICATIONS  (STANDING):  amLODIPine   Tablet 5 milliGRAM(s) Oral daily  azithromycin  IVPB      azithromycin  IVPB 500 milliGRAM(s) IV Intermittent every 24 hours  dextrose 5%. 1000 milliLiter(s) (50 mL/Hr) IV Continuous <Continuous>  dextrose 5%. 1000 milliLiter(s) (100 mL/Hr) IV Continuous <Continuous>  dextrose 50% Injectable 25 Gram(s) IV Push once  dextrose 50% Injectable 12.5 Gram(s) IV Push once  dextrose 50% Injectable 25 Gram(s) IV Push once  ertapenem  IVPB 1000 milliGRAM(s) IV Intermittent every 24 hours  glucagon  Injectable 1 milliGRAM(s) IntraMuscular once  heparin   Injectable 5000 Unit(s) SubCutaneous every 8 hours  insulin glargine Injectable (LANTUS) 10 Unit(s) SubCutaneous at bedtime  insulin lispro (ADMELOG) corrective regimen sliding scale   SubCutaneous three times a day before meals  insulin lispro (ADMELOG) corrective regimen sliding scale   SubCutaneous at bedtime  lactobacillus acidophilus 1 Tablet(s) Oral three times a day  levothyroxine 75 MICROGram(s) Oral daily  losartan 100 milliGRAM(s) Oral daily  metoprolol tartrate 12.5 milliGRAM(s) Oral two times a day  pantoprazole    Tablet 40 milliGRAM(s) Oral before breakfast    MEDICATIONS  (PRN):  acetaminophen     Tablet .. 650 milliGRAM(s) Oral every 6 hours PRN Temp greater or equal to 38C (100.4F), Mild Pain (1 - 3)  dextrose Oral Gel 15 Gram(s) Oral once PRN Blood Glucose LESS THAN 70 milliGRAM(s)/deciliter  guaiFENesin Oral Liquid (Sugar-Free) 200 milliGRAM(s) Oral every 6 hours PRN Cough  ondansetron Injectable 4 milliGRAM(s) IV Push every 8 hours PRN Nausea and/or Vomiting      Allergies    No Known Allergies    Intolerances        LABS:                        10.0   10.44 )-----------( 439      ( 12 Sep 2022 05:50 )             29.5     09-12    139  |  105  |  12  ----------------------------<  201<H>  3.5   |  27  |  0.99    Ca    8.4      12 Sep 2022 05:50  Phos  1.5     09-12  Mg     1.8     09-12    TPro  5.6<L>  /  Alb  2.0<L>  /  TBili  0.4  /  DBili  x   /  AST  41<H>  /  ALT  49  /  AlkPhos  91  09-12              CAPILLARY BLOOD GLUCOSE      POCT Blood Glucose.: 230 mg/dL (13 Sep 2022 08:22)  POCT Blood Glucose.: 280 mg/dL (12 Sep 2022 22:22)  POCT Blood Glucose.: 287 mg/dL (12 Sep 2022 20:52)  POCT Blood Glucose.: 271 mg/dL (12 Sep 2022 16:33)  POCT Blood Glucose.: 274 mg/dL (12 Sep 2022 11:29)    pro-bnp 903 09-07 @ 16:10     d-dimer --  09-07 @ 16:10      RADIOLOGY & ADDITIONAL TESTS:    POCT Blood Glucose.: 230 mg/dL (09.13.22 @ 08:22)   Ct scan chest:    ekg;    echo:

## 2022-09-13 NOTE — PROGRESS NOTE ADULT - SUBJECTIVE AND OBJECTIVE BOX
Patient is a 77y old  Female who presents with a chief complaint of sepsis 2/2 UTI (13 Sep 2022 12:34)    OVERNIGHT EVENTS: no acute changes    REVIEW OF SYSTEMS: [place] speaking #[id number]  CONSTITUTIONAL: No fever, chills  ENMT:  No difficulty hearing, no change in vision  NECK: No pain or stiffness  RESPIRATORY: +cough. No SOB  CARDIOVASCULAR: No chest pain, palpitations  GASTROINTESTINAL: No abdominal pain. No nausea, vomiting, or diarrhea  GENITOURINARY: No dysuria  NEUROLOGICAL: No HA, no dizziness.   SKIN: No itching, burning, rashes, or lesions   LYMPH NODES: No enlarged glands  ENDOCRINE: No heat or cold intolerance; No hair loss  MUSCULOSKELETAL: No joint pain or swelling; No muscle, back, or extremity pain  PSYCHIATRIC: No depression, anxiety  HEME/LYMPH: No easy bruising, or bleeding gums    T(C): 36.6 (09-13-22 @ 16:12), Max: 37.3 (09-13-22 @ 07:48)  HR: 90 (09-13-22 @ 16:12) (63 - 94)  BP: 155/63 (09-13-22 @ 16:12) (129/55 - 155/63)  RR: 18 (09-13-22 @ 16:12) (18 - 19)  SpO2: 97% (09-13-22 @ 16:12) (95% - 97%)  Wt(kg): --Vital Signs Last 24 Hrs  T(C): 36.6 (13 Sep 2022 16:12), Max: 37.3 (13 Sep 2022 07:48)  T(F): 97.9 (13 Sep 2022 16:12), Max: 99.1 (13 Sep 2022 07:48)  HR: 90 (13 Sep 2022 16:12) (63 - 94)  BP: 155/63 (13 Sep 2022 16:12) (129/55 - 155/63)  BP(mean): 74 (13 Sep 2022 07:48) (74 - 74)  RR: 18 (13 Sep 2022 16:12) (18 - 19)  SpO2: 97% (13 Sep 2022 16:12) (95% - 97%)    Parameters below as of 13 Sep 2022 16:12  Patient On (Oxygen Delivery Method): room air        MEDICATIONS  (STANDING):  amLODIPine   Tablet 5 milliGRAM(s) Oral daily  azithromycin  IVPB      azithromycin  IVPB 500 milliGRAM(s) IV Intermittent every 24 hours  dextrose 5%. 1000 milliLiter(s) (50 mL/Hr) IV Continuous <Continuous>  dextrose 5%. 1000 milliLiter(s) (100 mL/Hr) IV Continuous <Continuous>  dextrose 50% Injectable 25 Gram(s) IV Push once  dextrose 50% Injectable 12.5 Gram(s) IV Push once  dextrose 50% Injectable 25 Gram(s) IV Push once  ertapenem  IVPB 1000 milliGRAM(s) IV Intermittent every 24 hours  glucagon  Injectable 1 milliGRAM(s) IntraMuscular once  heparin   Injectable 5000 Unit(s) SubCutaneous every 8 hours  insulin glargine Injectable (LANTUS) 10 Unit(s) SubCutaneous at bedtime  insulin lispro (ADMELOG) corrective regimen sliding scale   SubCutaneous three times a day before meals  insulin lispro (ADMELOG) corrective regimen sliding scale   SubCutaneous at bedtime  lactobacillus acidophilus 1 Tablet(s) Oral three times a day  levothyroxine 75 MICROGram(s) Oral daily  losartan 100 milliGRAM(s) Oral daily  metoprolol tartrate 12.5 milliGRAM(s) Oral two times a day  pantoprazole    Tablet 40 milliGRAM(s) Oral before breakfast    MEDICATIONS  (PRN):  acetaminophen     Tablet .. 650 milliGRAM(s) Oral every 6 hours PRN Temp greater or equal to 38C (100.4F), Mild Pain (1 - 3)  dextrose Oral Gel 15 Gram(s) Oral once PRN Blood Glucose LESS THAN 70 milliGRAM(s)/deciliter  guaiFENesin Oral Liquid (Sugar-Free) 200 milliGRAM(s) Oral every 6 hours PRN Cough  ondansetron Injectable 4 milliGRAM(s) IV Push every 8 hours PRN Nausea and/or Vomiting      PHYSICAL EXAM:  GENERAL: NAD  EYES: clear conjunctiva; EOMI  ENMT: +left maxillary ecchymosis. Moist mucous membranes  NECK: Supple, No JVD, Normal thyroid  CHEST/LUNG: Clear to auscultation bilaterally; No rales, rhonchi, wheezing, or rubs  HEART: S1, S2, Regular rate and rhythm  ABDOMEN: Soft, Nontender, Nondistended; Bowel sounds present  NEURO: Alert & Oriented X3  EXTREMITIES: No LE edema, no calf tenderness  LYMPH: No lymphadenopathy noted  SKIN: No rashes or lesions    Consultant(s) Notes Reviewed:  [x ] YES  [ ] NO  Care Discussed with Consultants/Other Providers [ x] YES  [ ] NO    LABS:                        10.0   10.44 )-----------( 439      ( 12 Sep 2022 05:50 )             29.5     09-12    139  |  105  |  12  ----------------------------<  201<H>  3.5   |  27  |  0.99    Ca    8.4      12 Sep 2022 05:50  Phos  1.5     09-12  Mg     1.8     09-12    TPro  5.6<L>  /  Alb  2.0<L>  /  TBili  0.4  /  DBili  x   /  AST  41<H>  /  ALT  49  /  AlkPhos  91  09-12      CAPILLARY BLOOD GLUCOSE      POCT Blood Glucose.: 223 mg/dL (13 Sep 2022 16:38)  POCT Blood Glucose.: 226 mg/dL (13 Sep 2022 11:34)  POCT Blood Glucose.: 230 mg/dL (13 Sep 2022 08:22)  POCT Blood Glucose.: 280 mg/dL (12 Sep 2022 22:22)  POCT Blood Glucose.: 287 mg/dL (12 Sep 2022 20:52)            RADIOLOGY & ADDITIONAL TESTS:  pending CT A/P     Imaging Personally Reviewed:  [ ] YES  [ ] NO

## 2022-09-13 NOTE — PROGRESS NOTE ADULT - SUBJECTIVE AND OBJECTIVE BOX
Patient is a 77y old  Female who presents with a chief complaint of sepsis 2/2 UTI (13 Sep 2022 08:45)    pt seen in icu [  ], reg med floor [   ], bed [  ], chair at bedside [   ], a+o x3 [  ], lethargic [  ],  nad [  ]    chou [  ], ngt [  ], peg [  ], et tube [  ], cent line [  ], picc line [  ]        Allergies    No Known Allergies        Vitals    T(F): 99.1 (09-13-22 @ 07:48), Max: 99.1 (09-13-22 @ 07:48)  HR: 80 (09-13-22 @ 07:48) (63 - 94)  BP: 129/55 (09-13-22 @ 07:48) (129/55 - 154/92)  RR: 18 (09-13-22 @ 07:48) (18 - 19)  SpO2: 95% (09-13-22 @ 07:48) (95% - 97%)  Wt(kg): --  CAPILLARY BLOOD GLUCOSE      POCT Blood Glucose.: 230 mg/dL (13 Sep 2022 08:22)      Labs                          10.0   10.44 )-----------( 439      ( 12 Sep 2022 05:50 )             29.5       09-12    139  |  105  |  12  ----------------------------<  201<H>  3.5   |  27  |  0.99    Ca    8.4      12 Sep 2022 05:50  Phos  1.5     09-12  Mg     1.8     09-12    TPro  5.6<L>  /  Alb  2.0<L>  /  TBili  0.4  /  DBili  x   /  AST  41<H>  /  ALT  49  /  AlkPhos  91  09-12            .Blood Blood  09-10 @ 12:20   No growth to date.  --  --      Catheterized Catheterized  09-09 @ 13:25   >100,000 CFU/ml Escherichia coli ESBL  50,000 - 99,000 CFU/mL Enterococcus faecalis  --  Escherichia coli ESBL  Enterococcus faecalis      .Stool Feces  09-08 @ 19:20   No enteric pathogens to date: Final culture pending  --  --      .Stool Other, stool  09-08 @ 14:20   No Protozoa seen by trichrome stain  No Helminths or Protozoa seen in formalin concentrate  performed by iodine stain  (routine O+P not evaluated for Microsporidia,  Cryptosporidia or Cyclospora)  One negative sample does not necessarily rule  out the presence of a parasitic infection.  Moderate White blood cells  --  --      .Blood Blood  09-07 @ 16:57   No Growth Final  --  --      .Blood Blood  09-07 @ 16:50   Growth in aerobic bottle: Escherichia coli ESBL  ***Blood Panel PCR results on this specimen are available  approximately 3 hours after the Gram stain result.***  Gram stain, PCR, and/or culture results may not always  correspond due to difference in methodologies.  ************************************************************  This PCR assay was performed by multiplex PCR. This  Assay tests for 66 bacterial and resistance gene targets.  Please refer to the [facility] Labs test directory  at [email or web address] for details.  --  Blood Culture PCR  Escherichia coli ESBL          Radiology Results      Meds    MEDICATIONS  (STANDING):  amLODIPine   Tablet 5 milliGRAM(s) Oral daily  azithromycin  IVPB      azithromycin  IVPB 500 milliGRAM(s) IV Intermittent every 24 hours  dextrose 5%. 1000 milliLiter(s) (50 mL/Hr) IV Continuous <Continuous>  dextrose 5%. 1000 milliLiter(s) (100 mL/Hr) IV Continuous <Continuous>  dextrose 50% Injectable 25 Gram(s) IV Push once  dextrose 50% Injectable 12.5 Gram(s) IV Push once  dextrose 50% Injectable 25 Gram(s) IV Push once  ertapenem  IVPB 1000 milliGRAM(s) IV Intermittent every 24 hours  glucagon  Injectable 1 milliGRAM(s) IntraMuscular once  heparin   Injectable 5000 Unit(s) SubCutaneous every 8 hours  insulin glargine Injectable (LANTUS) 10 Unit(s) SubCutaneous at bedtime  insulin lispro (ADMELOG) corrective regimen sliding scale   SubCutaneous three times a day before meals  insulin lispro (ADMELOG) corrective regimen sliding scale   SubCutaneous at bedtime  lactobacillus acidophilus 1 Tablet(s) Oral three times a day  levothyroxine 75 MICROGram(s) Oral daily  losartan 100 milliGRAM(s) Oral daily  metoprolol tartrate 12.5 milliGRAM(s) Oral two times a day  pantoprazole    Tablet 40 milliGRAM(s) Oral before breakfast      MEDICATIONS  (PRN):  acetaminophen     Tablet .. 650 milliGRAM(s) Oral every 6 hours PRN Temp greater or equal to 38C (100.4F), Mild Pain (1 - 3)  dextrose Oral Gel 15 Gram(s) Oral once PRN Blood Glucose LESS THAN 70 milliGRAM(s)/deciliter  guaiFENesin Oral Liquid (Sugar-Free) 200 milliGRAM(s) Oral every 6 hours PRN Cough  ondansetron Injectable 4 milliGRAM(s) IV Push every 8 hours PRN Nausea and/or Vomiting      Physical Exam    Neuro :  no focal deficits  Respiratory: CTA B/L  CV: RRR, S1S2, no murmurs,   Abdominal: Soft, NT, ND +BS,  Extremities: No edema, + peripheral pulses    ASSESSMENT    Sepsis    Hypertension    Diabetes    Kidney stones    Depression    Hypothyroidism    HLD (hyperlipidemia)    S/P hysterectomy    S/P cholecystectomy        PLAN     Patient is a 77y old  Female who presents with a chief complaint of sepsis 2/2 UTI (13 Sep 2022 08:45)    pt seen in tele [ x ], reg med floor [   ], bed [ x ], chair at bedside [   ], a+o x3 [ x ], lethargic [  ],    nad [ x ]      Allergies    No Known Allergies        Vitals    T(F): 99.1 (09-13-22 @ 07:48), Max: 99.1 (09-13-22 @ 07:48)  HR: 80 (09-13-22 @ 07:48) (63 - 94)  BP: 129/55 (09-13-22 @ 07:48) (129/55 - 154/92)  RR: 18 (09-13-22 @ 07:48) (18 - 19)  SpO2: 95% (09-13-22 @ 07:48) (95% - 97%)  Wt(kg): --  CAPILLARY BLOOD GLUCOSE      POCT Blood Glucose.: 230 mg/dL (13 Sep 2022 08:22)      Labs                          10.0   10.44 )-----------( 439      ( 12 Sep 2022 05:50 )             29.5       09-12    139  |  105  |  12  ----------------------------<  201<H>  3.5   |  27  |  0.99    Ca    8.4      12 Sep 2022 05:50  Phos  1.5     09-12  Mg     1.8     09-12    TPro  5.6<L>  /  Alb  2.0<L>  /  TBili  0.4  /  DBili  x   /  AST  41<H>  /  ALT  49  /  AlkPhos  91  09-12            .Blood Blood  09-10 @ 12:20   No growth to date.  --  --      Catheterized Catheterized  09-09 @ 13:25   >100,000 CFU/ml Escherichia coli ESBL  50,000 - 99,000 CFU/mL Enterococcus faecalis  --  Escherichia coli ESBL  Enterococcus faecalis      .Stool Feces  09-08 @ 19:20   No enteric pathogens to date: Final culture pending  --  --      .Stool Other, stool  09-08 @ 14:20   No Protozoa seen by trichrome stain  No Helminths or Protozoa seen in formalin concentrate  performed by iodine stain  (routine O+P not evaluated for Microsporidia,  Cryptosporidia or Cyclospora)  One negative sample does not necessarily rule  out the presence of a parasitic infection.  Moderate White blood cells  --  --      .Blood Blood  09-07 @ 16:57   No Growth Final  --  --      .Blood Blood  09-07 @ 16:50   Growth in aerobic bottle: Escherichia coli ESBL  ***Blood Panel PCR results on this specimen are available  approximately 3 hours after the Gram stain result.***  Gram stain, PCR, and/or culture results may not always  correspond due to difference in methodologies.  ************************************************************  This PCR assay was performed by multiplex PCR. This  Assay tests for 66 bacterial and resistance gene targets.  Please refer to the [facility] Labs test directory  at [email or web address] for details.  --  Blood Culture PCR  Escherichia coli ESBL          Radiology Results      Meds    MEDICATIONS  (STANDING):  amLODIPine   Tablet 5 milliGRAM(s) Oral daily  azithromycin  IVPB      azithromycin  IVPB 500 milliGRAM(s) IV Intermittent every 24 hours  dextrose 5%. 1000 milliLiter(s) (50 mL/Hr) IV Continuous <Continuous>  dextrose 5%. 1000 milliLiter(s) (100 mL/Hr) IV Continuous <Continuous>  dextrose 50% Injectable 25 Gram(s) IV Push once  dextrose 50% Injectable 12.5 Gram(s) IV Push once  dextrose 50% Injectable 25 Gram(s) IV Push once  ertapenem  IVPB 1000 milliGRAM(s) IV Intermittent every 24 hours  glucagon  Injectable 1 milliGRAM(s) IntraMuscular once  heparin   Injectable 5000 Unit(s) SubCutaneous every 8 hours  insulin glargine Injectable (LANTUS) 10 Unit(s) SubCutaneous at bedtime  insulin lispro (ADMELOG) corrective regimen sliding scale   SubCutaneous three times a day before meals  insulin lispro (ADMELOG) corrective regimen sliding scale   SubCutaneous at bedtime  lactobacillus acidophilus 1 Tablet(s) Oral three times a day  levothyroxine 75 MICROGram(s) Oral daily  losartan 100 milliGRAM(s) Oral daily  metoprolol tartrate 12.5 milliGRAM(s) Oral two times a day  pantoprazole    Tablet 40 milliGRAM(s) Oral before breakfast      MEDICATIONS  (PRN):  acetaminophen     Tablet .. 650 milliGRAM(s) Oral every 6 hours PRN Temp greater or equal to 38C (100.4F), Mild Pain (1 - 3)  dextrose Oral Gel 15 Gram(s) Oral once PRN Blood Glucose LESS THAN 70 milliGRAM(s)/deciliter  guaiFENesin Oral Liquid (Sugar-Free) 200 milliGRAM(s) Oral every 6 hours PRN Cough  ondansetron Injectable 4 milliGRAM(s) IV Push every 8 hours PRN Nausea and/or Vomiting      Physical Exam    Neuro :  no focal deficits  Respiratory: CTA B/L  CV: RRR, S1S2, no murmurs,   Abdominal: Soft, NT, ND +BS,  Extremities: No edema, + peripheral pulses    ASSESSMENT    sepsis,   esbl e coli bacteremia  uti,   electrolyte imbalance,   joseph,   syncope,   s/p fall,   nasal fx,   diarrhea,   c diff r/o,   h/o HTN,   DM,   HLD,   Hypothyroidism,   s/p cholecystectomy,   s/p hysterectomy  Depression      PLAN      d/c tele,   cont bacid tid,   blood cx with esbl e coli noted above   rept blood cx neg noted above  ucx with esbl e coli noted above  id f/u   stool stool studies with Shiga-like toxin-producing E.coli (STEC) stx1/stx2: Detected noted above  Azithromycin 500 mg X 3 days to cover STEC, until 9/13/22  Continue Ertapenem 1 g daily to cover ESBL  E.coli Bacteremia, UTI and Pyelonephritis, until 9/24/22, IF REPEAT CT SCAN OF ABD/PELVIS SHOWS RESOLVEd RIGHT INGUINAL Abscess  wbc wnl   stool c diff neg noted    ct abd pelv with Evaluation of the solid organs, vascular structures and GI tract is limited without oral and IV contrast.  Perinephric edema suggestive of bilateral pyelonephritis. Small ascites and small bilateral pleural effusions.  Right inguinal cystic structure with surrounding edema may represent abscess or infected plug from prior hernia repair. If DVT is suspected,   recommend ultrasound Doppler evaluation noted    doppler le b/l with No evidence of deep venous thrombosis in either lower extremity noted    surg f/u   Rt groin mass likely enlarged inguinal Lymph Node, reactive due to undergoing infectious process.   No evidence of abscess.   rept ct abd pelv.  cardio f/u noted  echo with EF >70%. Grade I diastolic dysfunction. RV systolic pressure is mildly increased. Trivial pericardial effusion is seen noted above.  serum creat wnl   supplement phos for hypophosphatemia as needed   pulm cons for pulm htn/u   Bronchodilators  CCB.  hgba1c 6.5 noted above  lispro ss,   phys tx eval noted and rec phys tx 2-3x/week x 2 weeks with rolling walker at home with Home PT; with appropriate social support  cont current meds

## 2022-09-13 NOTE — PROGRESS NOTE ADULT - PROBLEM SELECTOR PLAN 6
pt p/w dizziness, syncope and fall  orthostatics now resolved  echo normal EF with G1DD  EKG - abnormal  s/p tele  Cardio Dr. Agrawal following

## 2022-09-13 NOTE — DISCHARGE NOTE NURSING/CASE MANAGEMENT/SOCIAL WORK - NSDCFUADDAPPT_GEN_ALL_CORE_FT
It should be noted that a list of PP HHA services and MH services were provided to pt/son:       	* [facility] ([address], [place] [address])  			       [facility] ([address] [facility] [facility], [place] [address]; [phone])   	* [facility] ([address], [address], [place] [address]; [phone])

## 2022-09-13 NOTE — PROGRESS NOTE ADULT - ASSESSMENT
Patient is a 77y old  Female from home w/ PMHx of HTN, DM, HLD, Hypothyroidism, s/p cholecystectomy, s/p hysterectomy, now presents to the ER for evaluation of generalized weakness, dizziness and dysuria for the past 3 days, watery diarrhea and associated with vomiting  and lower abdominal pain. She has dizziness causing her to fall and hit her face. On admission, she found to have fever, tachycardia, Leukocytosis and positive Urine analysis. The CT abd/pelvis shows  Perinephric edema suggestive of bilateral pyelonephritis and Right inguinal cystic structure with surrounding edema may represent  abscess or infected plug from prior hernia repair. She has started on Ceftriaxone and The ID consult requested to assist with further evaluation and antibiotic management,     # Sepsis ( Fever + tachycardia + Leukocytosis )- resolving  # UTI - WBC >50 /HPF Sq Epi: x / Non Sq Epi: Moderate /HPF / Bacteria: Many /HPF- Urine  Cx  from 9/9 grew  E.coli  # Bilateral pyelonephritis  # Right inguinal suspected abscess- ??  # ESBL  E.coli Bacteremia- 9/7/22 - source most likely - Repeat Blood culture from 9/10/22 is negative to date   # Shiga-like toxin-producing E.coli STEC - detected on GI PCR panel    would recommend:    1. CT abd/pelvis to reassess the resolution of  RIGHT INGUINAL Abscess  2. Azithromycin 500 mg  to cover STEC, until 9/13/22  3. Continue Ertapenem 1 g daily to cover ESBL  E.coli Bacteremia, UTI and Pyelonephritis, until 9/24/22  4. Monitor kidney function  5. OOB to chair     d/w  Covering Louis CARY    Attending Attestation:    Spent more than 35 minutes on total encounter, more than 50 % of the visit was spent counseling and/or coordinating care by the Attending physician.

## 2022-09-13 NOTE — PROGRESS NOTE ADULT - PROBLEM SELECTOR PLAN 2
initial blood cultures positive E.coli ESBL on 9/7  repeat blood cultures on 9/10 neg  likely due to UTI  leukocytosis improving, no fevers  plan as above

## 2022-09-13 NOTE — PROGRESS NOTE ADULT - PROBLEM SELECTOR PLAN 7
Ct a/p noncon-Right inguinal cystic structure with surrounding edema may represent   abscess or infected plug from prior hernia repair.  pending repeat CT a/p with IV contrast to r/o right inguinal abscess  surgery team following  ID Dr. Rodriguez following

## 2022-09-13 NOTE — PROGRESS NOTE ADULT - SUBJECTIVE AND OBJECTIVE BOX
CHIEF COMPLAINT:Patient is a 77y old  Female who presents with a chief complaint of sepsis 2/2 UTI.Pt appears comfortable.    	  REVIEW OF SYSTEMS:  CONSTITUTIONAL: No fever, weight loss, or fatigue  EYES: No eye pain, visual disturbances, or discharge  ENT:  No difficulty hearing, tinnitus, vertigo; No sinus or throat pain  NECK: No pain or stiffness  RESPIRATORY: No cough, wheezing, chills or hemoptysis; No Shortness of Breath  CARDIOVASCULAR: No chest pain, palpitations, passing out, dizziness, or leg swelling  GASTROINTESTINAL: No abdominal or epigastric pain. No nausea, vomiting, or hematemesis; No diarrhea or constipation. No melena or hematochezia.  GENITOURINARY: No dysuria, frequency, hematuria, or incontinence  NEUROLOGICAL: No headaches, memory loss, loss of strength, numbness, or tremors  SKIN: No itching, burning, rashes, or lesions   LYMPH Nodes: No enlarged glands  ENDOCRINE: No heat or cold intolerance; No hair loss  MUSCULOSKELETAL: No joint pain or swelling; No muscle, back, or extremity pain  PSYCHIATRIC: No depression, anxiety, mood swings, or difficulty sleeping  HEME/LYMPH: No easy bruising, or bleeding gums  ALLERGY AND IMMUNOLOGIC: No hives or eczema	      PHYSICAL EXAM:  T(C): 37.3 (09-13-22 @ 07:48), Max: 37.3 (09-13-22 @ 07:48)  HR: 80 (09-13-22 @ 07:48) (63 - 94)  BP: 129/55 (09-13-22 @ 07:48) (129/55 - 154/92)  RR: 18 (09-13-22 @ 07:48) (18 - 19)  SpO2: 95% (09-13-22 @ 07:48) (95% - 97%)  Wt(kg): --  I&O's Summary    12 Sep 2022 07:01  -  13 Sep 2022 07:00  --------------------------------------------------------  IN: 450 mL / OUT: 50 mL / NET: 400 mL        Appearance: Normal	  HEENT:   Normal oral mucosa, PERRL, EOMI	  Lymphatic: No lymphadenopathy  Cardiovascular: Normal S1 S2, No JVD, No murmurs, No edema  Respiratory: Lungs clear to auscultation	  Psychiatry: A & O x 3, Mood & affect appropriate  Gastrointestinal:  Soft, Non-tender, + BS	  Skin: No rashes, No ecchymoses, No cyanosis	  Neurologic: Non-focal  Extremities: Normal range of motion, No clubbing, cyanosis or edema  Vascular: Peripheral pulses palpable 2+ bilaterally    MEDICATIONS  (STANDING):  amLODIPine   Tablet 5 milliGRAM(s) Oral daily  azithromycin  IVPB      azithromycin  IVPB 500 milliGRAM(s) IV Intermittent every 24 hours  dextrose 5%. 1000 milliLiter(s) (50 mL/Hr) IV Continuous <Continuous>  dextrose 5%. 1000 milliLiter(s) (100 mL/Hr) IV Continuous <Continuous>  dextrose 50% Injectable 25 Gram(s) IV Push once  dextrose 50% Injectable 12.5 Gram(s) IV Push once  dextrose 50% Injectable 25 Gram(s) IV Push once  ertapenem  IVPB 1000 milliGRAM(s) IV Intermittent every 24 hours  glucagon  Injectable 1 milliGRAM(s) IntraMuscular once  heparin   Injectable 5000 Unit(s) SubCutaneous every 8 hours  insulin glargine Injectable (LANTUS) 10 Unit(s) SubCutaneous at bedtime  insulin lispro (ADMELOG) corrective regimen sliding scale   SubCutaneous three times a day before meals  insulin lispro (ADMELOG) corrective regimen sliding scale   SubCutaneous at bedtime  lactobacillus acidophilus 1 Tablet(s) Oral three times a day  levothyroxine 75 MICROGram(s) Oral daily  losartan 100 milliGRAM(s) Oral daily  metoprolol tartrate 12.5 milliGRAM(s) Oral two times a day  pantoprazole    Tablet 40 milliGRAM(s) Oral before breakfast      	  LABS:	 	                       10.0   10.44 )-----------( 439      ( 12 Sep 2022 05:50 )             29.5     09-12    139  |  105  |  12  ----------------------------<  201<H>  3.5   |  27  |  0.99    Ca    8.4      12 Sep 2022 05:50  Phos  1.5     09-12  Mg     1.8     09-12    TPro  5.6<L>  /  Alb  2.0<L>  /  TBili  0.4  /  DBili  x   /  AST  41<H>  /  ALT  49  /  AlkPhos  91  09-12    proBNP: Serum Pro-Brain Natriuretic Peptide: 903 pg/mL (09-07 @ 16:10)    TSH: Thyroid Stimulating Hormone, Serum: 2.00 uU/mL (09-09 @ 05:27)

## 2022-09-14 LAB
ANION GAP SERPL CALC-SCNC: 9 MMOL/L — SIGNIFICANT CHANGE UP (ref 5–17)
BUN SERPL-MCNC: 17 MG/DL — SIGNIFICANT CHANGE UP (ref 7–18)
CALCIUM SERPL-MCNC: 9.2 MG/DL — SIGNIFICANT CHANGE UP (ref 8.4–10.5)
CHLORIDE SERPL-SCNC: 101 MMOL/L — SIGNIFICANT CHANGE UP (ref 96–108)
CO2 SERPL-SCNC: 28 MMOL/L — SIGNIFICANT CHANGE UP (ref 22–31)
CREAT SERPL-MCNC: 1.1 MG/DL — SIGNIFICANT CHANGE UP (ref 0.5–1.3)
EGFR: 52 ML/MIN/1.73M2 — LOW
GLUCOSE BLDC GLUCOMTR-MCNC: 182 MG/DL — HIGH (ref 70–99)
GLUCOSE BLDC GLUCOMTR-MCNC: 215 MG/DL — HIGH (ref 70–99)
GLUCOSE BLDC GLUCOMTR-MCNC: 215 MG/DL — HIGH (ref 70–99)
GLUCOSE BLDC GLUCOMTR-MCNC: 219 MG/DL — HIGH (ref 70–99)
GLUCOSE BLDC GLUCOMTR-MCNC: 271 MG/DL — HIGH (ref 70–99)
GLUCOSE SERPL-MCNC: 193 MG/DL — HIGH (ref 70–99)
HCT VFR BLD CALC: 31 % — LOW (ref 34.5–45)
HGB BLD-MCNC: 10.4 G/DL — LOW (ref 11.5–15.5)
MCHC RBC-ENTMCNC: 29.9 PG — SIGNIFICANT CHANGE UP (ref 27–34)
MCHC RBC-ENTMCNC: 33.5 GM/DL — SIGNIFICANT CHANGE UP (ref 32–36)
MCV RBC AUTO: 89.1 FL — SIGNIFICANT CHANGE UP (ref 80–100)
NRBC # BLD: 0 /100 WBCS — SIGNIFICANT CHANGE UP (ref 0–0)
PLATELET # BLD AUTO: 647 K/UL — HIGH (ref 150–400)
POTASSIUM SERPL-MCNC: 4.3 MMOL/L — SIGNIFICANT CHANGE UP (ref 3.5–5.3)
POTASSIUM SERPL-SCNC: 4.3 MMOL/L — SIGNIFICANT CHANGE UP (ref 3.5–5.3)
RBC # BLD: 3.48 M/UL — LOW (ref 3.8–5.2)
RBC # FLD: 14.5 % — SIGNIFICANT CHANGE UP (ref 10.3–14.5)
SARS-COV-2 RNA SPEC QL NAA+PROBE: SIGNIFICANT CHANGE UP
SODIUM SERPL-SCNC: 138 MMOL/L — SIGNIFICANT CHANGE UP (ref 135–145)
WBC # BLD: 13.29 K/UL — HIGH (ref 3.8–10.5)
WBC # FLD AUTO: 13.29 K/UL — HIGH (ref 3.8–10.5)

## 2022-09-14 PROCEDURE — 74177 CT ABD & PELVIS W/CONTRAST: CPT | Mod: 26

## 2022-09-14 RX ADMIN — AMLODIPINE BESYLATE 5 MILLIGRAM(S): 2.5 TABLET ORAL at 05:54

## 2022-09-14 RX ADMIN — Medication 650 MILLIGRAM(S): at 22:30

## 2022-09-14 RX ADMIN — Medication 650 MILLIGRAM(S): at 05:55

## 2022-09-14 RX ADMIN — Medication 12.5 MILLIGRAM(S): at 05:54

## 2022-09-14 RX ADMIN — LOSARTAN POTASSIUM 100 MILLIGRAM(S): 100 TABLET, FILM COATED ORAL at 05:54

## 2022-09-14 RX ADMIN — Medication 2: at 11:59

## 2022-09-14 RX ADMIN — INSULIN GLARGINE 10 UNIT(S): 100 INJECTION, SOLUTION SUBCUTANEOUS at 21:44

## 2022-09-14 RX ADMIN — Medication 1: at 17:12

## 2022-09-14 RX ADMIN — Medication 650 MILLIGRAM(S): at 21:57

## 2022-09-14 RX ADMIN — PANTOPRAZOLE SODIUM 40 MILLIGRAM(S): 20 TABLET, DELAYED RELEASE ORAL at 05:54

## 2022-09-14 RX ADMIN — Medication 1 TABLET(S): at 05:54

## 2022-09-14 RX ADMIN — Medication 3 UNIT(S): at 17:09

## 2022-09-14 RX ADMIN — HEPARIN SODIUM 5000 UNIT(S): 5000 INJECTION INTRAVENOUS; SUBCUTANEOUS at 13:56

## 2022-09-14 RX ADMIN — Medication 650 MILLIGRAM(S): at 05:57

## 2022-09-14 RX ADMIN — Medication 1 TABLET(S): at 13:56

## 2022-09-14 RX ADMIN — Medication 3 MILLIGRAM(S): at 21:58

## 2022-09-14 RX ADMIN — Medication 3 UNIT(S): at 11:59

## 2022-09-14 RX ADMIN — Medication 12.5 MILLIGRAM(S): at 18:04

## 2022-09-14 RX ADMIN — Medication 1 TABLET(S): at 21:44

## 2022-09-14 RX ADMIN — HEPARIN SODIUM 5000 UNIT(S): 5000 INJECTION INTRAVENOUS; SUBCUTANEOUS at 05:55

## 2022-09-14 RX ADMIN — Medication 2: at 08:16

## 2022-09-14 RX ADMIN — ERTAPENEM SODIUM 120 MILLIGRAM(S): 1 INJECTION, POWDER, LYOPHILIZED, FOR SOLUTION INTRAMUSCULAR; INTRAVENOUS at 18:27

## 2022-09-14 RX ADMIN — HEPARIN SODIUM 5000 UNIT(S): 5000 INJECTION INTRAVENOUS; SUBCUTANEOUS at 21:43

## 2022-09-14 RX ADMIN — Medication 75 MICROGRAM(S): at 05:54

## 2022-09-14 NOTE — PROGRESS NOTE ADULT - PROBLEM SELECTOR PLAN 11
on home med - metformin  a1c 6.5  hold home med  continue ISS  continue lantus 10 units  continue admelog 3 units TID AC  FS ACHS  diabetic diet

## 2022-09-14 NOTE — PROGRESS NOTE ADULT - SUBJECTIVE AND OBJECTIVE BOX
Patient is a 77y old  Female who presents with a chief complaint of sepsis 2/2 UTI (14 Sep 2022 09:51)    Patient is awake, alert, laying comfortable in bed, not in acute distress at . Patient complains of dizziness when standing. Awaiting for schedule CT of abdomen/pelvis today.     INTERVAL HPI/OVERNIGHT EVENTS:      VITAL SIGNS:  T(F): 99.4 (09-14-22 @ 07:29)  HR: 70 (09-14-22 @ 07:29)  BP: 105/67 (09-14-22 @ 07:29)  RR: 18 (09-14-22 @ 07:29)  SpO2: 98% (09-14-22 @ 07:29)  Wt(kg): --  I&O's Detail    14 Sep 2022 07:01  -  14 Sep 2022 10:07  --------------------------------------------------------  IN:    Oral Fluid: 200 mL  Total IN: 200 mL    OUT:  Total OUT: 0 mL    Total NET: 200 mL              REVIEW OF SYSTEMS:    CONSTITUTIONAL:  No fevers, chills, sweats    HEENT:  Eyes:  No diplopia or blurred vision. ENT:  No earache, sore throat or runny nose.    CARDIOVASCULAR:  mild dizziness when standing.     RESPIRATORY:  Per HPI    GASTROINTESTINAL:  No abdominal pain, nausea, vomiting or diarrhea.    GENITOURINARY:  No dysuria, frequency or urgency.    NEUROLOGIC:  No paresthesias, fasciculations, seizures or weakness.    PSYCHIATRIC:  No disorder of thought or mood.      PHYSICAL EXAM:    Constitutional: Well developed and nourished  Eyes:Perrla  ENMT: normal  Neck:supple  Respiratory: good air entry  Cardiovascular: S1 S2 regular  Gastrointestinal: Soft, Non tender  Extremities: No edema  Vascular:normal  Neurological:Awake, alert,Ox3  Musculoskeletal:Normal      MEDICATIONS  (STANDING):  amLODIPine   Tablet 5 milliGRAM(s) Oral daily  dextrose 5%. 1000 milliLiter(s) (50 mL/Hr) IV Continuous <Continuous>  dextrose 5%. 1000 milliLiter(s) (100 mL/Hr) IV Continuous <Continuous>  dextrose 50% Injectable 25 Gram(s) IV Push once  dextrose 50% Injectable 12.5 Gram(s) IV Push once  dextrose 50% Injectable 25 Gram(s) IV Push once  ertapenem  IVPB 1000 milliGRAM(s) IV Intermittent every 24 hours  glucagon  Injectable 1 milliGRAM(s) IntraMuscular once  heparin   Injectable 5000 Unit(s) SubCutaneous every 8 hours  insulin glargine Injectable (LANTUS) 10 Unit(s) SubCutaneous at bedtime  insulin lispro (ADMELOG) corrective regimen sliding scale   SubCutaneous three times a day before meals  insulin lispro (ADMELOG) corrective regimen sliding scale   SubCutaneous at bedtime  insulin lispro Injectable (ADMELOG) 3 Unit(s) SubCutaneous three times a day before meals  lactobacillus acidophilus 1 Tablet(s) Oral three times a day  levothyroxine 75 MICROGram(s) Oral daily  losartan 100 milliGRAM(s) Oral daily  metoprolol tartrate 12.5 milliGRAM(s) Oral two times a day  pantoprazole    Tablet 40 milliGRAM(s) Oral before breakfast    MEDICATIONS  (PRN):  acetaminophen     Tablet .. 650 milliGRAM(s) Oral every 6 hours PRN Temp greater or equal to 38C (100.4F), Mild Pain (1 - 3)  dextrose Oral Gel 15 Gram(s) Oral once PRN Blood Glucose LESS THAN 70 milliGRAM(s)/deciliter  guaiFENesin Oral Liquid (Sugar-Free) 200 milliGRAM(s) Oral every 6 hours PRN Cough  melatonin 3 milliGRAM(s) Oral at bedtime PRN Insomnia  ondansetron Injectable 4 milliGRAM(s) IV Push every 8 hours PRN Nausea and/or Vomiting      Allergies    No Known Allergies    Intolerances        LABS:                        10.4   13.29 )-----------( 647      ( 14 Sep 2022 06:20 )             31.0     09-14    138  |  101  |  17  ----------------------------<  193<H>  4.3   |  28  |  1.10    Ca    9.2      14 Sep 2022 06:20                CAPILLARY BLOOD GLUCOSE      POCT Blood Glucose.: 215 mg/dL (14 Sep 2022 07:41)  POCT Blood Glucose.: 355 mg/dL (13 Sep 2022 21:02)  POCT Blood Glucose.: 223 mg/dL (13 Sep 2022 16:38)  POCT Blood Glucose.: 226 mg/dL (13 Sep 2022 11:34)    pro-bnp 903 09-07 @ 16:10     d-dimer --  09-07 @ 16:10      RADIOLOGY & ADDITIONAL TESTS:    < from: US Duplex Venous Lower Ext Complete, Bilateral (09.08.22 @ 11:57) >  IMPRESSION:  No evidence of deep venous thrombosis in either lower extremity.    --- End of Report ---    < end of copied text >      CXR:        Ct scan chest:  < from: CT Abdomen and Pelvis No Cont (09.08.22 @ 09:14) >  IMPRESSION:  Evaluation of the solid organs, vascular structures and GI tract is   limited without oral and IV contrast.    Perinephric edema suggestive of bilateral pyelonephritis. Small ascites   and small bilateral pleural effusions.    Right inguinal cystic structure with surrounding edema may represent   abscess or infected plug from prior hernia repair. If DVT is suspected,   recommend ultrasound Doppler evaluation.    < end of copied text >      ekg;    echo:  < from: Transthoracic Echocardiogram (09.08.22 @ 07:02) >  CONCLUSIONS:  1. Normal mitral valve.  2. Normal trileaflet aortic valve.  3. Aortic Root: 2.7 cm.    4. Normal left atrium.  LA volume index = 18 cc/m2.  5. Normal left ventricular internal dimensions and wall  thicknesses.  6. Hyperdynamic left ventricular systolic function (EF  >70%).  7. Grade I diastolic dysfunction (Impaired relaxation,  mild).  8. Normal right atrium.  9. Normal right ventricular size and systolic function  (TAPSE  2.3cm).  10. RA Pressure is 8 mm Hg.  11. RV systolic pressure is mildly increased at  44 mm Hg.  12. Normal tricuspid valve.  13. Normal pulmonic valve.  14. Trivial pericardial effusion is seen.      < end of copied text >

## 2022-09-14 NOTE — PROGRESS NOTE ADULT - SUBJECTIVE AND OBJECTIVE BOX
Patient is seen and examined at the bed side, is afebrile. The repeat CT abd/pelvis from 22 shows  Interval improvement in previously seen bilateral perinephric fat   stranding, and  Previously seen fat stranding and fluid in the right inguinal region has resolved.      REVIEW OF SYSTEMS: All other review systems are negative      ALLERGIES: No Known Allergies      Vital Signs Last 24 Hrs  T(C): 37 (14 Sep 2022 15:46), Max: 37.4 (14 Sep 2022 07:29)  T(F): 98.6 (14 Sep 2022 15:46), Max: 99.4 (14 Sep 2022 07:29)  HR: 83 (14 Sep 2022 15:46) (70 - 93)  BP: 125/55 (14 Sep 2022 15:46) (105/67 - 154/55)  BP(mean): --  RR: 18 (14 Sep 2022 15:46) (18 - 18)  SpO2: 97% (14 Sep 2022 15:46) (96% - 98%)    Parameters below as of 14 Sep 2022 15:46  Patient On (Oxygen Delivery Method): room air      PHYSICAL EXAM:  GENERAL: Not in distress   CHEST/LUNG: Not using accessory muscles   HEART: s1 and s2 present  ABDOMEN:  Nontender and  Nondistended  EXTREMITIES: No pedal  edema  CNS: Awake and Alert      LABS:                         10.4   13.29 )-----------( 647      ( 14 Sep 2022 06:20 )             31.0                           10.0   10.44 )-----------( 439      ( 12 Sep 2022 05:50 )             29.5                         11.0   16.66 )-----------( 269      ( 08 Sep 2022 05:30 )             30.9       -    138  |  101  |  17  ----------------------------<  193<H>  4.3   |  28  |  1.10    Ca    9.2      14 Sep 2022 06:20      09-12    139  |  105  |  12  ----------------------------<  201<H>  3.5   |  27  |  0.99    Ca    8.4      12 Sep 2022 05:50  Phos  1.5       Mg     1.8         TPro  5.6<L>  /  Alb  2.0<L>  /  TBili  0.4  /  DBili  x   /  AST  41<H>  /  ALT  49  /  AlkPhos  91        CAPILLARY BLOOD GLUCOSE  POCT Blood Glucose.: 235 mg/dL (08 Sep 2022 09:27)  POCT Blood Glucose.: 301 mg/dL (07 Sep 2022 13:56)        Urinalysis Basic - ( 07 Sep 2022 19:20 )  Color: Yellow / Appearance: Slightly Turbid / S.020 / pH: x  Gluc: x / Ketone: Negative  / Bili: Negative / Urobili: Negative   Blood: x / Protein: 100 / Nitrite: Negative   Leuk Esterase: Moderate / RBC: 5-10 /HPF / WBC >50 /HPF   Sq Epi: x / Non Sq Epi: Moderate /HPF / Bacteria: Many /HPF        MEDICATIONS  (STANDING):    amLODIPine   Tablet 5 milliGRAM(s) Oral daily  ertapenem  IVPB 1000 milliGRAM(s) IV Intermittent every 24 hours  glucagon  Injectable 1 milliGRAM(s) IntraMuscular once  heparin   Injectable 5000 Unit(s) SubCutaneous every 8 hours  insulin glargine Injectable (LANTUS) 10 Unit(s) SubCutaneous at bedtime  insulin lispro (ADMELOG) corrective regimen sliding scale   SubCutaneous three times a day before meals  insulin lispro (ADMELOG) corrective regimen sliding scale   SubCutaneous at bedtime  insulin lispro Injectable (ADMELOG) 3 Unit(s) SubCutaneous three times a day before meals  lactobacillus acidophilus 1 Tablet(s) Oral three times a day  levothyroxine 75 MICROGram(s) Oral daily  losartan 100 milliGRAM(s) Oral daily  metoprolol tartrate 12.5 milliGRAM(s) Oral two times a day  pantoprazole    Tablet 40 milliGRAM(s) Oral before breakfast        RADIOLOGY & ADDITIONAL TESTS:    22: CT Abdomen and Pelvis w/ IV Cont (22 @ 12:16) KIDNEYS/URETERS: Heterogeneous left renal enhancement including   peripheral areas of decreased enhancement consistent with pyelonephritis. Interval improvement in previously seen bilateral perinephric fat   stranding, left greater than right. No hydronephrosis. Subcentimeter  low-attenuation lesions in the kidneys, too small to characterize.    BLADDER: Distended with a normal caliber wall. REPRODUCTIVE ORGANS: Hysterectomy.    RETROPERITONEUM/LYMPH NODES: Multiple small lymph nodes at the anterior   aspect of the left kidney/left para-aortic region, likely reactive.  ABDOMINAL WALL: Previously seen fat stranding and fluid in the right inguinal region has resolved. Small fat-containing umbilical hernia.  BONES: Mild anterolisthesis of L4 on L5. No aggressive osseous lesion.    < end of copied text >      22 : US Duplex Venous Lower Ext Complete, Bilateral (22 @ 11:57) No evidence of deep venous thrombosis in either lower extremity.      22: CT Abdomen and Pelvis No Cont (22 @ 09:14) >Evaluation of the solid organs, vascular structures and GI tract is   limited without oral and IV contrast.  Perinephric edema suggestive of bilateral pyelonephritis. Small ascites  and small bilateral pleural effusions.    Right inguinal cystic structure with surrounding edema may represent  abscess or infected plug from prior hernia repair. If DVT is suspected,   recommend ultrasound Doppler evaluation.    Findings were discussed with LUIS DANIEL DODGE [id number] 2022 10:57  AM by Dr. Brianna Roy with read back confirmation.      22 : Xray Chest 1 View- PORTABLE-Urgent (22 @ 17:02) IMPRESSION: No acute finding or change.      MICROBIOLOGY  DATA:    Culture - Blood (09.10.22 @ 12:20)   Specimen Source: .Blood Blood   Culture Results: No growth to date.     Culture - Urine (22 @ 13:25)   Specimen Source: Catheterized Catheterized   Culture Results: >100,000 CFU/ml Escherichia coli     GI PCR Panel Stool (22 @ 19:20)   GI PCR Panel: Detected:   Shiga-like toxin-producing E.coli (STEC) stx1/stx2: Detected     Clostridium difficile Toxin by PCR (22 @ 12:30)   C Diff by PCR Result: NotDetec     Culture - Blood (22 @ 16:57)   Specimen Source: .Blood Blood   Culture Results: No growth to date.     Culture - Blood (22 @ 16:50)   - ESBL: Detec   - Escherichia coli: Detec   - CTX-M Resistance Marker: Detec   - Trimethoprim/Sulfamethoxazole: R >2/38   Gram Stain:   Growth in aerobic bottle: Gram Negative Rods   - Amikacin: S <=16   - Ampicillin: R >16 These ampicillin results predict results for amoxicillin   - Ampicillin/Sulbactam: R >16/8 Enterobacter, Klebsiella aerogenes, Citrobacter, and Serratia may develop resistance during prolonged therapy (3-4 days)   - Aztreonam: R >16   - Cefazolin: R >16 Enterobacter, Klebsiella aerogenes, Citrobacter, and Serratia may develop resistance during prolonged therapy (3-4 days)   - Cefepime: R >16   - Ceftriaxone: R >32 Enterobacter, Klebsiella aerogenes, Citrobacter, and Serratia may develop resistance during prolonged therapy   - Ciprofloxacin: R >2   - Ertapenem: S <=0.5   - Gentamicin: R >8   - Imipenem: S <=1   - Levofloxacin: R >4   - Meropenem: S <=1   - Piperacillin/Tazobactam: R <=8   - Tobramycin: R >8   Specimen Source: .Blood Blood   Organism: Blood Culture PCR   Organism: Escherichia coli ESBL   Culture Results:   Growth in aerobic bottle: Escherichia coli ESBL   Rapid HIV-1/2 Antibody (22 @ 16:10)   Rapid HIV-1/2 Antibody: Nonreact:     COVID-19 PCR (22 @ 16:10)   COVID-19 PCR: NotDetec:

## 2022-09-14 NOTE — PROGRESS NOTE ADULT - SUBJECTIVE AND OBJECTIVE BOX
Patient is a 77y old  Female who presents with a chief complaint of sepsis 2/2 UTI (14 Sep 2022 11:51)    OVERNIGHT EVENTS: no acute changes    REVIEW OF SYSTEMS:   CONSTITUTIONAL: No fever, chills  ENMT:  No difficulty hearing, no change in vision  NECK: No pain or stiffness  RESPIRATORY: No cough, SOB  CARDIOVASCULAR: No chest pain, palpitations  GASTROINTESTINAL: No abdominal pain. No nausea, vomiting, or diarrhea  GENITOURINARY: No dysuria  NEUROLOGICAL: No HA  SKIN: No itching, burning, rashes, or lesions   LYMPH NODES: No enlarged glands  ENDOCRINE: No heat or cold intolerance; No hair loss  MUSCULOSKELETAL: No joint pain or swelling; No muscle, back, or extremity pain  PSYCHIATRIC: No depression, anxiety  HEME/LYMPH: No easy bruising, or bleeding gums    T(C): 37 (09-14-22 @ 15:46), Max: 37.4 (09-14-22 @ 07:29)  HR: 83 (09-14-22 @ 15:46) (70 - 93)  BP: 125/55 (09-14-22 @ 15:46) (105/67 - 154/55)  RR: 18 (09-14-22 @ 15:46) (18 - 18)  SpO2: 97% (09-14-22 @ 15:46) (96% - 98%)  Wt(kg): --Vital Signs Last 24 Hrs  T(C): 37 (14 Sep 2022 15:46), Max: 37.4 (14 Sep 2022 07:29)  T(F): 98.6 (14 Sep 2022 15:46), Max: 99.4 (14 Sep 2022 07:29)  HR: 83 (14 Sep 2022 15:46) (70 - 93)  BP: 125/55 (14 Sep 2022 15:46) (105/67 - 154/55)  BP(mean): --  RR: 18 (14 Sep 2022 15:46) (18 - 18)  SpO2: 97% (14 Sep 2022 15:46) (96% - 98%)    Parameters below as of 14 Sep 2022 15:46  Patient On (Oxygen Delivery Method): room air        MEDICATIONS  (STANDING):  amLODIPine   Tablet 5 milliGRAM(s) Oral daily  dextrose 5%. 1000 milliLiter(s) (50 mL/Hr) IV Continuous <Continuous>  dextrose 5%. 1000 milliLiter(s) (100 mL/Hr) IV Continuous <Continuous>  dextrose 50% Injectable 25 Gram(s) IV Push once  dextrose 50% Injectable 12.5 Gram(s) IV Push once  dextrose 50% Injectable 25 Gram(s) IV Push once  ertapenem  IVPB 1000 milliGRAM(s) IV Intermittent every 24 hours  glucagon  Injectable 1 milliGRAM(s) IntraMuscular once  heparin   Injectable 5000 Unit(s) SubCutaneous every 8 hours  insulin glargine Injectable (LANTUS) 10 Unit(s) SubCutaneous at bedtime  insulin lispro (ADMELOG) corrective regimen sliding scale   SubCutaneous three times a day before meals  insulin lispro (ADMELOG) corrective regimen sliding scale   SubCutaneous at bedtime  insulin lispro Injectable (ADMELOG) 3 Unit(s) SubCutaneous three times a day before meals  lactobacillus acidophilus 1 Tablet(s) Oral three times a day  levothyroxine 75 MICROGram(s) Oral daily  losartan 100 milliGRAM(s) Oral daily  metoprolol tartrate 12.5 milliGRAM(s) Oral two times a day  pantoprazole    Tablet 40 milliGRAM(s) Oral before breakfast    MEDICATIONS  (PRN):  acetaminophen     Tablet .. 650 milliGRAM(s) Oral every 6 hours PRN Temp greater or equal to 38C (100.4F), Mild Pain (1 - 3)  dextrose Oral Gel 15 Gram(s) Oral once PRN Blood Glucose LESS THAN 70 milliGRAM(s)/deciliter  guaiFENesin Oral Liquid (Sugar-Free) 200 milliGRAM(s) Oral every 6 hours PRN Cough  melatonin 3 milliGRAM(s) Oral at bedtime PRN Insomnia  ondansetron Injectable 4 milliGRAM(s) IV Push every 8 hours PRN Nausea and/or Vomiting      PHYSICAL EXAM:  GENERAL: NAD  EYES: clear conjunctiva; EOMI  ENMT: +left maxillary ecchymosis. Moist mucous membranes  NECK: Supple, No JVD, Normal thyroid  CHEST/LUNG: Clear to auscultation bilaterally; No rales, rhonchi, wheezing, or rubs  HEART: S1, S2, Regular rate and rhythm  ABDOMEN: Soft, Nontender, Nondistended; Bowel sounds present  NEURO: Alert & Oriented X3  EXTREMITIES: No LE edema, no calf tenderness  LYMPH: No lymphadenopathy noted  SKIN: No rashes or lesions    Consultant(s) Notes Reviewed:  [x ] YES  [ ] NO  Care Discussed with Consultants/Other Providers [ x] YES  [ ] NO    LABS:                        10.4   13.29 )-----------( 647      ( 14 Sep 2022 06:20 )             31.0     09-14    138  |  101  |  17  ----------------------------<  193<H>  4.3   |  28  |  1.10    Ca    9.2      14 Sep 2022 06:20        CAPILLARY BLOOD GLUCOSE      POCT Blood Glucose.: 182 mg/dL (14 Sep 2022 16:39)  POCT Blood Glucose.: 219 mg/dL (14 Sep 2022 11:33)  POCT Blood Glucose.: 215 mg/dL (14 Sep 2022 07:41)  POCT Blood Glucose.: 355 mg/dL (13 Sep 2022 21:02)            RADIOLOGY & ADDITIONAL TESTS:  < from: CT Abdomen and Pelvis w/ IV Cont (09.14.22 @ 12:16) >    ACC: [id number] EXAM:  CT ABDOMEN AND PELVIS IC                          PROCEDURE DATE:  09/14/2022          INTERPRETATION:  CLINICAL INFORMATION: Sepsis. Bacteremia. Evaluate for   inguinal abscess. UTI.    COMPARISON: CT abdomen/pelvis 9/8/2022 and CT chest/abdomen/pelvis   6/12/2019    CONTRAST/COMPLICATIONS:  IV Contrast: Omnipaque 350  90 cc administered   10 cc discarded  Oral Contrast: NONE  Complications: None reported at time of study completion    PROCEDURE:  CT of the Abdomen and Pelvis was performed.  Sagittal and coronal reformats were performed.    FINDINGS:  LOWER CHEST: Small left and trace right pleural effusions with   compressive atelectasis in the lower lobes. Small amount of pericardial   fluid.    LIVER: Subcentimeter low-attenuation lesion at the inferior aspect of the   right hepatic lobe, likely a cyst. Otherwise unremarkable.  BILE DUCTS: Stable biliary ductal dilatation compatible with the   patient's postcholecystectomy state.  GALLBLADDER: Cholecystectomy.  SPLEEN: Within normal limits.  PANCREAS: Within normal limits.  ADRENALS: Left adrenal gland thickening. Unremarkable right adrenal gland.  KIDNEYS/URETERS: Heterogeneous left renal enhancement including   peripheral areas of decreased enhancement consistent with pyelonephritis.   Interval improvement in previously seen bilateral perinephric fat   stranding, left greater than right. No hydronephrosis. Subcentimeter   low-attenuation lesions in the kidneys, too small to characterize.    BLADDER: Distended with a normal caliber wall.  REPRODUCTIVE ORGANS: Hysterectomy.    BOWEL: No bowel obstruction. Status post appendectomy. 1.1 cm high   density structure adjacent to the mid sigmoid colon, likely a calcified   lymph node, unchanged.  PERITONEUM: No ascites.  VESSELS: Atherosclerotic changes.  RETROPERITONEUM/LYMPH NODES: Multiple small lymph nodes at the anterior   aspect of the left kidney/left para-aortic region, likely reactive.  ABDOMINAL WALL: Previously seen fat stranding and fluid in the right   inguinal region has resolved. Small fat-containing umbilical hernia.  BONES: Mild anterolisthesis of L4 on L5. No aggressive osseous lesion.    IMPRESSION:  Heterogeneous left renal enhancement and described above consistent with   pyelonephritis.    Interval improvement in previously seen bilateral perinephric fat   stranding, left greater than right.    Interval resolution of previously seen infiltration of the fat and fluid   in the right inguinal region.    Small left and trace right pleural effusions.    --- End of Report ---            CHRISTOPHER BOSTON MD; Attending Radiologist  This document has been electronically signed. Sep 14 2022  2:07PM    < end of copied text >      Imaging Personally Reviewed:  [ ] YES  [ ] NO

## 2022-09-14 NOTE — PROGRESS NOTE ADULT - ASSESSMENT
Patient is a 77y old  Female from home w/ PMHx of HTN, DM, HLD, Hypothyroidism, s/p cholecystectomy, s/p hysterectomy, now presents to the ER for evaluation of generalized weakness, dizziness and dysuria for the past 3 days, watery diarrhea and associated with vomiting  and lower abdominal pain. She has dizziness causing her to fall and hit her face. On admission, she found to have fever, tachycardia, Leukocytosis and positive Urine analysis. The CT abd/pelvis shows  Perinephric edema suggestive of bilateral pyelonephritis and Right inguinal cystic structure with surrounding edema may represent  abscess or infected plug from prior hernia repair. She has started on Ceftriaxone and The ID consult requested to assist with further evaluation and antibiotic management,     # Sepsis ( Fever + tachycardia + Leukocytosis )- resolving  # UTI - WBC >50 /HPF Sq Epi: x / Non Sq Epi: Moderate /HPF / Bacteria: Many /HPF- Urine  Cx  from 9/9 grew  E.coli  # Bilateral pyelonephritis  # Right inguinal suspected abscess- The repeat CT abd/pelvis from 9/14/22 shows  Interval improvement in previously seen bilateral perinephric fat   stranding, and  Previously seen fat stranding and fluid in the right inguinal region has resolved.  # ESBL  E.coli Bacteremia- 9/7/22 - source most likely - Repeat Blood culture from 9/10/22 is negative to date   # Shiga-like toxin-producing E.coli STEC - detected on GI PCR panel    would recommend:    1. OOB to chair   2. PICC/MID  Line  3. Continue Ertapenem 1 g daily to cover ESBL  E.coli Bacteremia, UTI and Pyelonephritis, until 9/24/22  4. Monitor kidney function    d/w  Covering Louis CARY    Attending Attestation:    Spent more than 35 minutes on total encounter, more than 50 % of the visit was spent counseling and/or coordinating care by the Attending physician.

## 2022-09-14 NOTE — PROGRESS NOTE ADULT - ASSESSMENT
76 y/o F from home w/ PMHx of HTN, DM, HLD, Hypothyroidism, s/p cholecystectomy, s/p hysterectomy. She had been having generalized weakness, dizziness and dysuria for the past 3 days,diarrhea,CALLY,UTI,abnormal EKG,E coli bacteremia.  1.PT.  2.Diarrhea-E coli bacteremia-ABX.  3.CALLY-resolved  4.Repeat blood cx-negative.  5.Hypothyroidism-synthroid.  6.DM-Insulin.  7.GI and DVT prophylaxis.

## 2022-09-14 NOTE — PROGRESS NOTE ADULT - SUBJECTIVE AND OBJECTIVE BOX
CHIEF COMPLAINT:Patient is a 77y old  Female who presents with a chief complaint of sepsis 2/2 UTI.Pt appears comfortable.    	  REVIEW OF SYSTEMS:  CONSTITUTIONAL: No fever, weight loss, or fatigue  EYES: No eye pain, visual disturbances, or discharge  ENT:  No difficulty hearing, tinnitus, vertigo; No sinus or throat pain  NECK: No pain or stiffness  RESPIRATORY: No cough, wheezing, chills or hemoptysis; No Shortness of Breath  CARDIOVASCULAR: No chest pain, palpitations, passing out, dizziness, or leg swelling  GASTROINTESTINAL: No abdominal or epigastric pain. No nausea, vomiting, or hematemesis; No diarrhea or constipation. No melena or hematochezia.  GENITOURINARY: No dysuria, frequency, hematuria, or incontinence  NEUROLOGICAL: No headaches, memory loss, loss of strength, numbness, or tremors  SKIN: No itching, burning, rashes, or lesions   LYMPH Nodes: No enlarged glands  ENDOCRINE: No heat or cold intolerance; No hair loss  MUSCULOSKELETAL: No joint pain or swelling; No muscle, back, or extremity pain  PSYCHIATRIC: No depression, anxiety, mood swings, or difficulty sleeping  HEME/LYMPH: No easy bruising, or bleeding gums  ALLERGY AND IMMUNOLOGIC: No hives or eczema	    PHYSICAL EXAM:  T(C): 37.4 (09-14-22 @ 07:29), Max: 37.4 (09-14-22 @ 07:29)  HR: 70 (09-14-22 @ 07:29) (70 - 93)  BP: 105/67 (09-14-22 @ 07:29) (105/67 - 155/63)  RR: 18 (09-14-22 @ 07:29) (18 - 18)  SpO2: 98% (09-14-22 @ 07:29) (96% - 98%)  Wt(kg): --  I&O's Summary    14 Sep 2022 07:01  -  14 Sep 2022 10:26  --------------------------------------------------------  IN: 200 mL / OUT: 0 mL / NET: 200 mL        Appearance: Normal	  HEENT:   Normal oral mucosa, PERRL, EOMI	  Lymphatic: No lymphadenopathy  Cardiovascular: Normal S1 S2, No JVD, No murmurs, No edema  Respiratory: Lungs clear to auscultation	  Psychiatry: A & O x 3, Mood & affect appropriate  Gastrointestinal:  Soft, Non-tender, + BS	  Skin: No rashes, No ecchymoses, No cyanosis	  Neurologic: Non-focal  Extremities: Normal range of motion, No clubbing, cyanosis or edema  Vascular: Peripheral pulses palpable 2+ bilaterally    MEDICATIONS  (STANDING):  amLODIPine   Tablet 5 milliGRAM(s) Oral daily  dextrose 5%. 1000 milliLiter(s) (50 mL/Hr) IV Continuous <Continuous>  dextrose 5%. 1000 milliLiter(s) (100 mL/Hr) IV Continuous <Continuous>  dextrose 50% Injectable 25 Gram(s) IV Push once  dextrose 50% Injectable 12.5 Gram(s) IV Push once  dextrose 50% Injectable 25 Gram(s) IV Push once  ertapenem  IVPB 1000 milliGRAM(s) IV Intermittent every 24 hours  glucagon  Injectable 1 milliGRAM(s) IntraMuscular once  heparin   Injectable 5000 Unit(s) SubCutaneous every 8 hours  insulin glargine Injectable (LANTUS) 10 Unit(s) SubCutaneous at bedtime  insulin lispro (ADMELOG) corrective regimen sliding scale   SubCutaneous three times a day before meals  insulin lispro (ADMELOG) corrective regimen sliding scale   SubCutaneous at bedtime  insulin lispro Injectable (ADMELOG) 3 Unit(s) SubCutaneous three times a day before meals  lactobacillus acidophilus 1 Tablet(s) Oral three times a day  levothyroxine 75 MICROGram(s) Oral daily  losartan 100 milliGRAM(s) Oral daily  metoprolol tartrate 12.5 milliGRAM(s) Oral two times a day  pantoprazole    Tablet 40 milliGRAM(s) Oral before breakfast      LABS:	 	                       10.4   13.29 )-----------( 647      ( 14 Sep 2022 06:20 )             31.0     09-14    138  |  101  |  17  ----------------------------<  193<H>  4.3   |  28  |  1.10    Ca    9.2      14 Sep 2022 06:20      proBNP: Serum Pro-Brain Natriuretic Peptide: 903 pg/mL (09-07 @ 16:10)      TSH: Thyroid Stimulating Hormone, Serum: 2.00 uU/mL (09-09 @ 05:27)

## 2022-09-14 NOTE — PROGRESS NOTE ADULT - PROBLEM SELECTOR PLAN 2
How Severe Are Your Spot(S)?: mild Have Your Spot(S) Been Treated In The Past?: has not been treated Hpi Title: Evaluation of Skin Lesions initial blood cultures positive E.coli ESBL on 9/7  repeat blood cultures on 9/10 neg  likely due to UTI  leukocytosis improving, no fevers  plan as above

## 2022-09-14 NOTE — PROGRESS NOTE ADULT - SUBJECTIVE AND OBJECTIVE BOX
Patient is a 77y old  Female who presents with a chief complaint of sepsis 2/2 UTI (13 Sep 2022 16:42)    pt seen in icu [  ], reg med floor [   ], bed [  ], chair at bedside [   ], a+o x3 [  ], lethargic [  ],  nad [  ]    chou [  ], ngt [  ], peg [  ], et tube [  ], cent line [  ], picc line [  ]        Allergies    No Known Allergies        Vitals    T(F): 99.4 (09-14-22 @ 07:29), Max: 99.4 (09-14-22 @ 07:29)  HR: 70 (09-14-22 @ 07:29) (70 - 93)  BP: 105/67 (09-14-22 @ 07:29) (105/67 - 155/63)  RR: 18 (09-14-22 @ 07:29) (18 - 18)  SpO2: 98% (09-14-22 @ 07:29) (96% - 98%)  Wt(kg): --  CAPILLARY BLOOD GLUCOSE      POCT Blood Glucose.: 215 mg/dL (14 Sep 2022 07:41)      Labs                          10.4   13.29 )-----------( 647      ( 14 Sep 2022 06:20 )             31.0       09-14    138  |  101  |  17  ----------------------------<  193<H>  4.3   |  28  |  1.10    Ca    9.2      14 Sep 2022 06:20              .Blood Blood  09-10 @ 12:20   No growth to date.  --  --      Catheterized Catheterized  09-09 @ 13:25   >100,000 CFU/ml Escherichia coli ESBL  50,000 - 99,000 CFU/mL Enterococcus faecalis  --  Escherichia coli ESBL  Enterococcus faecalis      .Stool Feces  09-08 @ 19:20   No enteric pathogens isolated.  (Stool culture examined for Salmonella,  Shigella, Campylobacter, Aeromonas, Plesiomonas,  Vibrio, E.coli O157 and Yersinia)  --  --      .Stool Other, stool  09-08 @ 14:20   No Protozoa seen by trichrome stain  No Helminths or Protozoa seen in formalin concentrate  performed by iodine stain  (routine O+P not evaluated for Microsporidia,  Cryptosporidia or Cyclospora)  One negative sample does not necessarily rule  out the presence of a parasitic infection.  Moderate White blood cells  --  --      .Blood Blood  09-07 @ 16:57   No Growth Final  --  --      .Blood Blood  09-07 @ 16:50   Growth in aerobic bottle: Escherichia coli ESBL  ***Blood Panel PCR results on this specimen are available  approximately 3 hours after the Gram stain result.***  Gram stain, PCR, and/or culture results may not always  correspond due to difference in methodologies.  ************************************************************  This PCR assay was performed by multiplex PCR. This  Assay tests for 66 bacterial and resistance gene targets.  Please refer to the [facility] Labs test directory  at [email or web address] for details.  --  Blood Culture PCR  Escherichia coli ESBL          Radiology Results      Meds    MEDICATIONS  (STANDING):  amLODIPine   Tablet 5 milliGRAM(s) Oral daily  dextrose 5%. 1000 milliLiter(s) (50 mL/Hr) IV Continuous <Continuous>  dextrose 5%. 1000 milliLiter(s) (100 mL/Hr) IV Continuous <Continuous>  dextrose 50% Injectable 25 Gram(s) IV Push once  dextrose 50% Injectable 12.5 Gram(s) IV Push once  dextrose 50% Injectable 25 Gram(s) IV Push once  ertapenem  IVPB 1000 milliGRAM(s) IV Intermittent every 24 hours  glucagon  Injectable 1 milliGRAM(s) IntraMuscular once  heparin   Injectable 5000 Unit(s) SubCutaneous every 8 hours  insulin glargine Injectable (LANTUS) 10 Unit(s) SubCutaneous at bedtime  insulin lispro (ADMELOG) corrective regimen sliding scale   SubCutaneous three times a day before meals  insulin lispro (ADMELOG) corrective regimen sliding scale   SubCutaneous at bedtime  insulin lispro Injectable (ADMELOG) 3 Unit(s) SubCutaneous three times a day before meals  lactobacillus acidophilus 1 Tablet(s) Oral three times a day  levothyroxine 75 MICROGram(s) Oral daily  losartan 100 milliGRAM(s) Oral daily  metoprolol tartrate 12.5 milliGRAM(s) Oral two times a day  pantoprazole    Tablet 40 milliGRAM(s) Oral before breakfast      MEDICATIONS  (PRN):  acetaminophen     Tablet .. 650 milliGRAM(s) Oral every 6 hours PRN Temp greater or equal to 38C (100.4F), Mild Pain (1 - 3)  dextrose Oral Gel 15 Gram(s) Oral once PRN Blood Glucose LESS THAN 70 milliGRAM(s)/deciliter  guaiFENesin Oral Liquid (Sugar-Free) 200 milliGRAM(s) Oral every 6 hours PRN Cough  melatonin 3 milliGRAM(s) Oral at bedtime PRN Insomnia  ondansetron Injectable 4 milliGRAM(s) IV Push every 8 hours PRN Nausea and/or Vomiting      Physical Exam    Neuro :  no focal deficits  Respiratory: CTA B/L  CV: RRR, S1S2, no murmurs,   Abdominal: Soft, NT, ND +BS,  Extremities: No edema, + peripheral pulses    ASSESSMENT    Sepsis    Hypertension    Diabetes    Kidney stones    Depression    Hypothyroidism    HLD (hyperlipidemia)    S/P hysterectomy    S/P cholecystectomy        PLAN     Patient is a 77y old  Female who presents with a chief complaint of sepsis 2/2 UTI (13 Sep 2022 16:42)    pt seen in tele [ x ], reg med floor [   ], bed [ x ], chair at bedside [   ], a+o x3 [ x ], lethargic [  ],    nad [ x ]      Allergies    No Known Allergies        Vitals    T(F): 99.4 (09-14-22 @ 07:29), Max: 99.4 (09-14-22 @ 07:29)  HR: 70 (09-14-22 @ 07:29) (70 - 93)  BP: 105/67 (09-14-22 @ 07:29) (105/67 - 155/63)  RR: 18 (09-14-22 @ 07:29) (18 - 18)  SpO2: 98% (09-14-22 @ 07:29) (96% - 98%)  Wt(kg): --  CAPILLARY BLOOD GLUCOSE      POCT Blood Glucose.: 215 mg/dL (14 Sep 2022 07:41)      Labs                          10.4   13.29 )-----------( 647      ( 14 Sep 2022 06:20 )             31.0       09-14    138  |  101  |  17  ----------------------------<  193<H>  4.3   |  28  |  1.10    Ca    9.2      14 Sep 2022 06:20              .Blood Blood  09-10 @ 12:20   No growth to date.  --  --      Catheterized Catheterized  09-09 @ 13:25   >100,000 CFU/ml Escherichia coli ESBL  50,000 - 99,000 CFU/mL Enterococcus faecalis  --  Escherichia coli ESBL  Enterococcus faecalis      .Stool Feces  09-08 @ 19:20   No enteric pathogens isolated.  (Stool culture examined for Salmonella,  Shigella, Campylobacter, Aeromonas, Plesiomonas,  Vibrio, E.coli O157 and Yersinia)  --  --      .Stool Other, stool  09-08 @ 14:20   No Protozoa seen by trichrome stain  No Helminths or Protozoa seen in formalin concentrate  performed by iodine stain  (routine O+P not evaluated for Microsporidia,  Cryptosporidia or Cyclospora)  One negative sample does not necessarily rule  out the presence of a parasitic infection.  Moderate White blood cells  --  --      .Blood Blood  09-07 @ 16:57   No Growth Final  --  --      .Blood Blood  09-07 @ 16:50   Growth in aerobic bottle: Escherichia coli ESBL  ***Blood Panel PCR results on this specimen are available  approximately 3 hours after the Gram stain result.***  Gram stain, PCR, and/or culture results may not always  correspond due to difference in methodologies.  ************************************************************  This PCR assay was performed by multiplex PCR. This  Assay tests for 66 bacterial and resistance gene targets.  Please refer to the [facility] Labs test directory  at [email or web address] for details.  --  Blood Culture PCR  Escherichia coli ESBL          Radiology Results      Meds    MEDICATIONS  (STANDING):  amLODIPine   Tablet 5 milliGRAM(s) Oral daily  dextrose 5%. 1000 milliLiter(s) (50 mL/Hr) IV Continuous <Continuous>  dextrose 5%. 1000 milliLiter(s) (100 mL/Hr) IV Continuous <Continuous>  dextrose 50% Injectable 25 Gram(s) IV Push once  dextrose 50% Injectable 12.5 Gram(s) IV Push once  dextrose 50% Injectable 25 Gram(s) IV Push once  ertapenem  IVPB 1000 milliGRAM(s) IV Intermittent every 24 hours  glucagon  Injectable 1 milliGRAM(s) IntraMuscular once  heparin   Injectable 5000 Unit(s) SubCutaneous every 8 hours  insulin glargine Injectable (LANTUS) 10 Unit(s) SubCutaneous at bedtime  insulin lispro (ADMELOG) corrective regimen sliding scale   SubCutaneous three times a day before meals  insulin lispro (ADMELOG) corrective regimen sliding scale   SubCutaneous at bedtime  insulin lispro Injectable (ADMELOG) 3 Unit(s) SubCutaneous three times a day before meals  lactobacillus acidophilus 1 Tablet(s) Oral three times a day  levothyroxine 75 MICROGram(s) Oral daily  losartan 100 milliGRAM(s) Oral daily  metoprolol tartrate 12.5 milliGRAM(s) Oral two times a day  pantoprazole    Tablet 40 milliGRAM(s) Oral before breakfast      MEDICATIONS  (PRN):  acetaminophen     Tablet .. 650 milliGRAM(s) Oral every 6 hours PRN Temp greater or equal to 38C (100.4F), Mild Pain (1 - 3)  dextrose Oral Gel 15 Gram(s) Oral once PRN Blood Glucose LESS THAN 70 milliGRAM(s)/deciliter  guaiFENesin Oral Liquid (Sugar-Free) 200 milliGRAM(s) Oral every 6 hours PRN Cough  melatonin 3 milliGRAM(s) Oral at bedtime PRN Insomnia  ondansetron Injectable 4 milliGRAM(s) IV Push every 8 hours PRN Nausea and/or Vomiting      Physical Exam      Neuro :  no focal deficits  Respiratory: CTA B/L  CV: RRR, S1S2, no murmurs,   Abdominal: Soft, NT, ND +BS,  Extremities: No edema, + peripheral pulses    ASSESSMENT    sepsis,   esbl e coli bacteremia  uti,   electrolyte imbalance,   joseph,   syncope,   s/p fall,   nasal fx,   diarrhea,   c diff r/o,   h/o HTN,   DM,   HLD,   Hypothyroidism,   s/p cholecystectomy,   s/p hysterectomy  Depression      PLAN      d/c tele,   cont bacid tid,   blood cx with esbl e coli noted above   rept blood cx neg noted above  ucx with esbl e coli noted above  id f/u   stool stool studies with Shiga-like toxin-producing E.coli (STEC) stx1/stx2: Detected noted above  completed Azithromycin 500 mg X 3 days to cover STEC, on 9/13/22  Continue Ertapenem 1 g daily to cover ESBL  E.coli Bacteremia, UTI and Pyelonephritis, until 9/24/22, IF REPEAT CT SCAN OF ABD/PELVIS SHOWS RESOLVEd RIGHT INGUINAL Abscess  stool c diff neg noted    ct abd pelv with Evaluation of the solid organs, vascular structures and GI tract is limited without oral and IV contrast.  Perinephric edema suggestive of bilateral pyelonephritis. Small ascites and small bilateral pleural effusions.  Right inguinal cystic structure with surrounding edema may represent abscess or infected plug from prior hernia repair. If DVT is suspected,   recommend ultrasound Doppler evaluation noted    doppler le b/l with No evidence of deep venous thrombosis in either lower extremity noted    surg f/u   Rt groin mass likely enlarged inguinal Lymph Node, reactive due to undergoing infectious process.   No evidence of abscess.   f/u rept ct abd pelv.  cardio f/u noted  echo with EF >70%. Grade I diastolic dysfunction. RV systolic pressure is mildly increased. Trivial pericardial effusion is seen noted    serum creat wnl   pulm f/u   Bronchodilators  CCB.  hgba1c 6.5 noted    lispro ss,   phys tx eval noted and rec phys tx 2-3x/week x 2 weeks with rolling walker at home with Home PT; with appropriate social support  cont current meds

## 2022-09-14 NOTE — PROGRESS NOTE ADULT - ASSESSMENT
76 y/o F from home w/ PMHx of HTN, DM, HLD, Hypothyroidism, s/p cholecystectomy, s/p hysterectomy. She had been having generalized weakness, dizziness and dysuria for the past 3 days. In the morning of admission, she was in the bathroom for an episode of non-foul smelling watery diarrhea with associated lower abdominal pain. She also had an episode of nonbloody, non-bilous vomiting. Of note, she is also taking multiple antibiotics including Bactrim and Levaquin for UTI, Metronidazole for diarrhea and Fluconazole for urinary candidiasis. Additionally pt had dizziness causing her to fall and hit her face. She developed abrasions on the face near the bridge of the nose.  Admitted to telemetry for concern of syncopal episode. Cardiology Dr. Agrawal following. Echo normal EF with G1DD. CT Head was negative. CT Maxillofacial sinus is negative showing slight L nasal displacement. Pt was also found to have sepsis 2/2 UTI. started on abx, ivf. ID Dr. Rodriguez following. CXR no acute findings. CT a/p noted with possible inguinal cysts v. abscess. Surgery Team following. Pt also had US duplex B/L LE, neg for DVT. Hospital course complicated by ESBL bacteremia, urine culture grew ESBL and enterococcus faecaelis. Repeat blood cultures on 9/10 negative. Repeat CT A/P with IV contrast to r/o inguinal abscess shows improvement of fluid collection.  Pt is pending midline placement in IR for IV Ertapenem until 9/24.      78 y/o F from home w/ PMHx of HTN, DM, HLD, Hypothyroidism, s/p cholecystectomy, s/p hysterectomy. She had been having generalized weakness, dizziness and dysuria for the past 3 days. In the morning of admission, she was in the bathroom for an episode of non-foul smelling watery diarrhea with associated lower abdominal pain. She also had an episode of nonbloody, non-bilous vomiting. Of note, she is also taking multiple antibiotics including Bactrim and Levaquin for UTI, Metronidazole for diarrhea and Fluconazole for urinary candidiasis. Additionally pt had dizziness causing her to fall and hit her face. She developed abrasions on the face near the bridge of the nose.  Admitted to telemetry for concern of syncopal episode. Cardiology Dr. Agrawal following. Echo normal EF with G1DD. CT Head was negative. CT Maxillofacial sinus is negative showing slight L nasal displacement. Pt was also found to have sepsis 2/2 UTI. started on abx, ivf. ID Dr. Rodriguez following. CXR no acute findings. CT a/p noted with possible inguinal cysts v. abscess. Surgery Team following. Pt also had US duplex B/L LE, neg for DVT. Hospital course complicated by ESBL bacteremia, urine culture grew ESBL and enterococcus faecaelis. Repeat blood cultures on 9/10 negative. Repeat CT A/P with IV contrast to r/o inguinal abscess shows improvement of fluid collection.  Pt is pending midline placement in IR for IV Ertapenem until 9/24. Daughter updated on plan for home abx infusion services, understands and willing to assist mom daily.

## 2022-09-14 NOTE — PROGRESS NOTE ADULT - PROBLEM SELECTOR PLAN 7
Ct a/p noncon-Right inguinal cystic structure with surrounding edema may represent   abscess or infected plug from prior hernia repair.  repeat CT a/p with IV contrast to r/o right inguinal abscess, shows improvement of fluid collection s/p abx  surgery team following  ID Dr. Rodriguez following

## 2022-09-15 VITALS
SYSTOLIC BLOOD PRESSURE: 126 MMHG | RESPIRATION RATE: 18 BRPM | TEMPERATURE: 98 F | HEART RATE: 78 BPM | DIASTOLIC BLOOD PRESSURE: 53 MMHG | OXYGEN SATURATION: 97 %

## 2022-09-15 LAB
CULTURE RESULTS: SIGNIFICANT CHANGE UP
GLUCOSE BLDC GLUCOMTR-MCNC: 225 MG/DL — HIGH (ref 70–99)
GLUCOSE BLDC GLUCOMTR-MCNC: 241 MG/DL — HIGH (ref 70–99)
GLUCOSE BLDC GLUCOMTR-MCNC: 488 MG/DL — CRITICAL HIGH (ref 70–99)
SPECIMEN SOURCE: SIGNIFICANT CHANGE UP

## 2022-09-15 PROCEDURE — 74176 CT ABD & PELVIS W/O CONTRAST: CPT

## 2022-09-15 PROCEDURE — 80048 BASIC METABOLIC PNL TOTAL CA: CPT

## 2022-09-15 PROCEDURE — 97162 PT EVAL MOD COMPLEX 30 MIN: CPT

## 2022-09-15 PROCEDURE — 99291 CRITICAL CARE FIRST HOUR: CPT | Mod: 25

## 2022-09-15 PROCEDURE — 82962 GLUCOSE BLOOD TEST: CPT

## 2022-09-15 PROCEDURE — 83735 ASSAY OF MAGNESIUM: CPT

## 2022-09-15 PROCEDURE — 85027 COMPLETE CBC AUTOMATED: CPT

## 2022-09-15 PROCEDURE — 87150 DNA/RNA AMPLIFIED PROBE: CPT

## 2022-09-15 PROCEDURE — 87186 SC STD MICRODIL/AGAR DIL: CPT

## 2022-09-15 PROCEDURE — 82607 VITAMIN B-12: CPT

## 2022-09-15 PROCEDURE — 83605 ASSAY OF LACTIC ACID: CPT

## 2022-09-15 PROCEDURE — 86703 HIV-1/HIV-2 1 RESULT ANTBDY: CPT

## 2022-09-15 PROCEDURE — 87086 URINE CULTURE/COLONY COUNT: CPT

## 2022-09-15 PROCEDURE — 84100 ASSAY OF PHOSPHORUS: CPT

## 2022-09-15 PROCEDURE — 81001 URINALYSIS AUTO W/SCOPE: CPT

## 2022-09-15 PROCEDURE — 83690 ASSAY OF LIPASE: CPT

## 2022-09-15 PROCEDURE — 36415 COLL VENOUS BLD VENIPUNCTURE: CPT

## 2022-09-15 PROCEDURE — 71045 X-RAY EXAM CHEST 1 VIEW: CPT

## 2022-09-15 PROCEDURE — 82550 ASSAY OF CK (CPK): CPT

## 2022-09-15 PROCEDURE — 84484 ASSAY OF TROPONIN QUANT: CPT

## 2022-09-15 PROCEDURE — 93970 EXTREMITY STUDY: CPT

## 2022-09-15 PROCEDURE — 83930 ASSAY OF BLOOD OSMOLALITY: CPT

## 2022-09-15 PROCEDURE — 87040 BLOOD CULTURE FOR BACTERIA: CPT

## 2022-09-15 PROCEDURE — 96375 TX/PRO/DX INJ NEW DRUG ADDON: CPT

## 2022-09-15 PROCEDURE — 74177 CT ABD & PELVIS W/CONTRAST: CPT

## 2022-09-15 PROCEDURE — 96374 THER/PROPH/DIAG INJ IV PUSH: CPT

## 2022-09-15 PROCEDURE — 87507 IADNA-DNA/RNA PROBE TQ 12-25: CPT

## 2022-09-15 PROCEDURE — 82306 VITAMIN D 25 HYDROXY: CPT

## 2022-09-15 PROCEDURE — 87493 C DIFF AMPLIFIED PROBE: CPT

## 2022-09-15 PROCEDURE — 82009 KETONE BODYS QUAL: CPT

## 2022-09-15 PROCEDURE — 85025 COMPLETE CBC W/AUTO DIFF WBC: CPT

## 2022-09-15 PROCEDURE — 86803 HEPATITIS C AB TEST: CPT

## 2022-09-15 PROCEDURE — 70486 CT MAXILLOFACIAL W/O DYE: CPT | Mod: MA

## 2022-09-15 PROCEDURE — 84443 ASSAY THYROID STIM HORMONE: CPT

## 2022-09-15 PROCEDURE — 87177 OVA AND PARASITES SMEARS: CPT

## 2022-09-15 PROCEDURE — 70450 CT HEAD/BRAIN W/O DYE: CPT | Mod: MA

## 2022-09-15 PROCEDURE — 93306 TTE W/DOPPLER COMPLETE: CPT

## 2022-09-15 PROCEDURE — 87046 STOOL CULTR AEROBIC BACT EA: CPT

## 2022-09-15 PROCEDURE — 83880 ASSAY OF NATRIURETIC PEPTIDE: CPT

## 2022-09-15 PROCEDURE — 87045 FECES CULTURE AEROBIC BACT: CPT

## 2022-09-15 PROCEDURE — 80053 COMPREHEN METABOLIC PANEL: CPT

## 2022-09-15 PROCEDURE — 93005 ELECTROCARDIOGRAM TRACING: CPT

## 2022-09-15 PROCEDURE — 83036 HEMOGLOBIN GLYCOSYLATED A1C: CPT

## 2022-09-15 PROCEDURE — 87635 SARS-COV-2 COVID-19 AMP PRB: CPT

## 2022-09-15 RX ORDER — METRONIDAZOLE 500 MG
1 TABLET ORAL
Qty: 0 | Refills: 0 | DISCHARGE

## 2022-09-15 RX ORDER — AZTREONAM 2 G
1 VIAL (EA) INJECTION
Qty: 0 | Refills: 0 | DISCHARGE

## 2022-09-15 RX ORDER — FLUCONAZOLE 150 MG/1
1 TABLET ORAL
Qty: 0 | Refills: 0 | DISCHARGE

## 2022-09-15 RX ORDER — ACETAMINOPHEN 500 MG
2 TABLET ORAL
Qty: 0 | Refills: 0 | DISCHARGE
Start: 2022-09-15

## 2022-09-15 RX ADMIN — LOSARTAN POTASSIUM 100 MILLIGRAM(S): 100 TABLET, FILM COATED ORAL at 05:56

## 2022-09-15 RX ADMIN — Medication 3 UNIT(S): at 08:05

## 2022-09-15 RX ADMIN — Medication 2: at 12:08

## 2022-09-15 RX ADMIN — Medication 12.5 MILLIGRAM(S): at 17:40

## 2022-09-15 RX ADMIN — Medication 3 UNIT(S): at 17:38

## 2022-09-15 RX ADMIN — PANTOPRAZOLE SODIUM 40 MILLIGRAM(S): 20 TABLET, DELAYED RELEASE ORAL at 05:56

## 2022-09-15 RX ADMIN — Medication 1 TABLET(S): at 05:57

## 2022-09-15 RX ADMIN — AMLODIPINE BESYLATE 5 MILLIGRAM(S): 2.5 TABLET ORAL at 05:56

## 2022-09-15 RX ADMIN — Medication 12.5 MILLIGRAM(S): at 05:56

## 2022-09-15 RX ADMIN — Medication 650 MILLIGRAM(S): at 18:27

## 2022-09-15 RX ADMIN — Medication 75 MICROGRAM(S): at 05:56

## 2022-09-15 RX ADMIN — HEPARIN SODIUM 5000 UNIT(S): 5000 INJECTION INTRAVENOUS; SUBCUTANEOUS at 05:57

## 2022-09-15 RX ADMIN — Medication 2: at 17:37

## 2022-09-15 RX ADMIN — ERTAPENEM SODIUM 120 MILLIGRAM(S): 1 INJECTION, POWDER, LYOPHILIZED, FOR SOLUTION INTRAMUSCULAR; INTRAVENOUS at 17:42

## 2022-09-15 RX ADMIN — Medication 6: at 08:04

## 2022-09-15 RX ADMIN — Medication 3 UNIT(S): at 12:08

## 2022-09-15 NOTE — PROGRESS NOTE ADULT - SUBJECTIVE AND OBJECTIVE BOX
CHIEF COMPLAINT:Patient is a 77y old  Female who presents with a chief complaint of sepsis 2/2 UTI.Pt appears comfortable.    	  REVIEW OF SYSTEMS:  CONSTITUTIONAL: No fever, weight loss, or fatigue  EYES: No eye pain, visual disturbances, or discharge  ENT:  No difficulty hearing, tinnitus, vertigo; No sinus or throat pain  NECK: No pain or stiffness  RESPIRATORY: No cough, wheezing, chills or hemoptysis; No Shortness of Breath  CARDIOVASCULAR: No chest pain, palpitations, passing out, dizziness, or leg swelling  GASTROINTESTINAL: No abdominal or epigastric pain. No nausea, vomiting, or hematemesis; No diarrhea or constipation. No melena or hematochezia.  GENITOURINARY: No dysuria, frequency, hematuria, or incontinence  NEUROLOGICAL: No headaches, memory loss, loss of strength, numbness, or tremors  SKIN: No itching, burning, rashes, or lesions   LYMPH Nodes: No enlarged glands  ENDOCRINE: No heat or cold intolerance; No hair loss  MUSCULOSKELETAL: No joint pain or swelling; No muscle, back, or extremity pain  PSYCHIATRIC: No depression, anxiety, mood swings, or difficulty sleeping  HEME/LYMPH: No easy bruising, or bleeding gums  ALLERGY AND IMMUNOLOGIC: No hives or eczema	        PHYSICAL EXAM:  T(C): 36.7 (09-15-22 @ 07:23), Max: 37 (09-14-22 @ 15:46)  HR: 72 (09-15-22 @ 07:23) (72 - 95)  BP: 139/53 (09-15-22 @ 07:23) (116/73 - 139/53)  RR: 17 (09-15-22 @ 07:23) (17 - 18)  SpO2: 94% (09-15-22 @ 07:23) (94% - 98%)  Wt(kg): --  I&O's Summary    14 Sep 2022 07:01  -  15 Sep 2022 07:00  --------------------------------------------------------  IN: 200 mL / OUT: 0 mL / NET: 200 mL        Appearance: Normal	  HEENT:   Normal oral mucosa, PERRL, EOMI	  Lymphatic: No lymphadenopathy  Cardiovascular: Normal S1 S2, No JVD, No murmurs, No edema  Respiratory: Lungs clear to auscultation	  Psychiatry: A & O x 3, Mood & affect appropriate  Gastrointestinal:  Soft, Non-tender, + BS	  Skin: No rashes, No ecchymoses, No cyanosis	  Neurologic: Non-focal  Extremities: Normal range of motion, No clubbing, cyanosis or edema  Vascular: Peripheral pulses palpable 2+ bilaterally    MEDICATIONS  (STANDING):  amLODIPine   Tablet 5 milliGRAM(s) Oral daily  dextrose 5%. 1000 milliLiter(s) (50 mL/Hr) IV Continuous <Continuous>  dextrose 5%. 1000 milliLiter(s) (100 mL/Hr) IV Continuous <Continuous>  dextrose 50% Injectable 25 Gram(s) IV Push once  dextrose 50% Injectable 12.5 Gram(s) IV Push once  dextrose 50% Injectable 25 Gram(s) IV Push once  ertapenem  IVPB 1000 milliGRAM(s) IV Intermittent every 24 hours  glucagon  Injectable 1 milliGRAM(s) IntraMuscular once  heparin   Injectable 5000 Unit(s) SubCutaneous every 8 hours  insulin glargine Injectable (LANTUS) 10 Unit(s) SubCutaneous at bedtime  insulin lispro (ADMELOG) corrective regimen sliding scale   SubCutaneous three times a day before meals  insulin lispro (ADMELOG) corrective regimen sliding scale   SubCutaneous at bedtime  insulin lispro Injectable (ADMELOG) 3 Unit(s) SubCutaneous three times a day before meals  lactobacillus acidophilus 1 Tablet(s) Oral three times a day  levothyroxine 75 MICROGram(s) Oral daily  losartan 100 milliGRAM(s) Oral daily  metoprolol tartrate 12.5 milliGRAM(s) Oral two times a day  pantoprazole    Tablet 40 milliGRAM(s) Oral before breakfast      LABS:	 	                        10.4   13.29 )-----------( 647      ( 14 Sep 2022 06:20 )             31.0     09-14    138  |  101  |  17  ----------------------------<  193<H>  4.3   |  28  |  1.10    Ca    9.2      14 Sep 2022 06:20      proBNP: Serum Pro-Brain Natriuretic Peptide: 903 pg/mL (09-07 @ 16:10)      TSH: Thyroid Stimulating Hormone, Serum: 2.00 uU/mL (09-09 @ 05:27)

## 2022-09-15 NOTE — PROGRESS NOTE ADULT - SUBJECTIVE AND OBJECTIVE BOX
Patient is a 77y old  Female who presents with a chief complaint of sepsis 2/2 UTI (14 Sep 2022 17:10)    pt seen in tele [ x ], reg med floor [   ], bed [ x ], chair at bedside [   ], a+o x3 [ x ], lethargic [  ],    nad [ x ]      Allergies    No Known Allergies        Vitals    T(F): 98.2 (09-15-22 @ 04:56), Max: 98.6 (09-14-22 @ 15:46)  HR: 75 (09-15-22 @ 04:56) (75 - 95)  BP: 133/69 (09-15-22 @ 04:56) (116/73 - 133/69)  RR: 17 (09-15-22 @ 04:56) (17 - 18)  SpO2: 97% (09-15-22 @ 04:56) (96% - 98%)  Wt(kg): --  CAPILLARY BLOOD GLUCOSE      POCT Blood Glucose.: 215 mg/dL (14 Sep 2022 21:39)      Labs                          10.4   13.29 )-----------( 647      ( 14 Sep 2022 06:20 )             31.0       09-14    138  |  101  |  17  ----------------------------<  193<H>  4.3   |  28  |  1.10    Ca    9.2      14 Sep 2022 06:20              .Blood Blood  09-10 @ 12:20   No growth to date.  --  --      Catheterized Catheterized  09-09 @ 13:25   >100,000 CFU/ml Escherichia coli ESBL  50,000 - 99,000 CFU/mL Enterococcus faecalis  --  Escherichia coli ESBL  Enterococcus faecalis      .Stool Feces  09-08 @ 19:20   No enteric pathogens isolated.  (Stool culture examined for Salmonella,  Shigella, Campylobacter, Aeromonas, Plesiomonas,  Vibrio, E.coli O157 and Yersinia)  --  --      .Stool Other, stool  09-08 @ 14:20   No Protozoa seen by trichrome stain  No Helminths or Protozoa seen in formalin concentrate  performed by iodine stain  (routine O+P not evaluated for Microsporidia,  Cryptosporidia or Cyclospora)  One negative sample does not necessarily rule  out the presence of a parasitic infection.  Moderate White blood cells  --  --      .Blood Blood  09-07 @ 16:57   No Growth Final  --  --      .Blood Blood  09-07 @ 16:50   Growth in aerobic bottle: Escherichia coli ESBL  ***Blood Panel PCR results on this specimen are available  approximately 3 hours after the Gram stain result.***  Gram stain, PCR, and/or culture results may not always  correspond due to difference in methodologies.  ************************************************************  This PCR assay was performed by multiplex PCR. This  Assay tests for 66 bacterial and resistance gene targets.  Please refer to the [facility] test directory  at [email or web address] for details.  --  Blood Culture PCR  Escherichia coli ESBL          Radiology Results    < from: CT Abdomen and Pelvis w/ IV Cont (09.14.22 @ 12:16) >  IMPRESSION:  Heterogeneous left renal enhancement and described above consistent with   pyelonephritis.    Interval improvement in previously seen bilateral perinephric fat   stranding, left greater than right.    Interval resolution of previously seen infiltration of the fat and fluid   in the right inguinal region.    Small left and trace right pleural effusions.    < end of copied text >        Meds    MEDICATIONS  (STANDING):  amLODIPine   Tablet 5 milliGRAM(s) Oral daily  dextrose 5%. 1000 milliLiter(s) (100 mL/Hr) IV Continuous <Continuous>  dextrose 5%. 1000 milliLiter(s) (50 mL/Hr) IV Continuous <Continuous>  dextrose 50% Injectable 25 Gram(s) IV Push once  dextrose 50% Injectable 12.5 Gram(s) IV Push once  dextrose 50% Injectable 25 Gram(s) IV Push once  ertapenem  IVPB 1000 milliGRAM(s) IV Intermittent every 24 hours  glucagon  Injectable 1 milliGRAM(s) IntraMuscular once  heparin   Injectable 5000 Unit(s) SubCutaneous every 8 hours  insulin glargine Injectable (LANTUS) 10 Unit(s) SubCutaneous at bedtime  insulin lispro (ADMELOG) corrective regimen sliding scale   SubCutaneous three times a day before meals  insulin lispro (ADMELOG) corrective regimen sliding scale   SubCutaneous at bedtime  insulin lispro Injectable (ADMELOG) 3 Unit(s) SubCutaneous three times a day before meals  lactobacillus acidophilus 1 Tablet(s) Oral three times a day  levothyroxine 75 MICROGram(s) Oral daily  losartan 100 milliGRAM(s) Oral daily  metoprolol tartrate 12.5 milliGRAM(s) Oral two times a day  pantoprazole    Tablet 40 milliGRAM(s) Oral before breakfast      MEDICATIONS  (PRN):  acetaminophen     Tablet .. 650 milliGRAM(s) Oral every 6 hours PRN Temp greater or equal to 38C (100.4F), Mild Pain (1 - 3)  dextrose Oral Gel 15 Gram(s) Oral once PRN Blood Glucose LESS THAN 70 milliGRAM(s)/deciliter  guaiFENesin Oral Liquid (Sugar-Free) 200 milliGRAM(s) Oral every 6 hours PRN Cough  melatonin 3 milliGRAM(s) Oral at bedtime PRN Insomnia  ondansetron Injectable 4 milliGRAM(s) IV Push every 8 hours PRN Nausea and/or Vomiting      Physical Exam    Neuro :  no focal deficits  Respiratory: CTA B/L  CV: RRR, S1S2, no murmurs,   Abdominal: Soft, NT, ND +BS,  Extremities: No edema, + peripheral pulses    ASSESSMENT    sepsis,   esbl e coli bacteremia  uti,   electrolyte imbalance,   joseph,   syncope,   s/p fall,   nasal fx,   diarrhea,   c diff r/o,   h/o HTN,   DM,   HLD,   Hypothyroidism,   s/p cholecystectomy,   s/p hysterectomy  Depression      PLAN      d/c tele,   cont bacid tid,   blood cx with esbl e coli noted above   rept blood cx neg noted above  ucx with esbl e coli noted above   rept ct abd pelv with Heterogeneous left renal enhancement and described above consistent with pyelonephritis. Interval improvement in previously seen bilateral perinephric fat stranding,   left greater than right. Interval resolution of previously seen infiltration of the fat and fluid in the right inguinal region. Small left and trace right pleural effusions noted above..  id f/u   stool stool studies with Shiga-like toxin-producing E.coli (STEC) stx1/stx2: Detected noted above  completed Azithromycin 500 mg X 3 days to cover STEC, on 9/13/22  Continue Ertapenem 1 g daily to cover ESBL  E.coli Bacteremia, UTI and Pyelonephritis, until 9/24/22   pt to have placement of extended dwell, mid line or picc line to complete abx tx outpt  stool c diff neg noted    ct abd pelv with Evaluation of the solid organs, vascular structures and GI tract is limited without oral and IV contrast.  Perinephric edema suggestive of bilateral pyelonephritis. Small ascites and small bilateral pleural effusions.  Right inguinal cystic structure with surrounding edema may represent abscess or infected plug from prior hernia repair. If DVT is suspected,   recommend ultrasound Doppler evaluation noted    doppler le b/l with No evidence of deep venous thrombosis in either lower extremity noted    surg f/u   Rt groin mass likely enlarged inguinal Lymph Node, reactive due to undergoing infectious process.   No evidence of abscess.   cardio f/u noted  echo with EF >70%. Grade I diastolic dysfunction. RV systolic pressure is mildly increased. Trivial pericardial effusion is seen noted    serum creat wnl   pulm f/u   Bronchodilators  CCB.  hgba1c 6.5 noted    lispro ss,   phys tx eval noted and rec phys tx 2-3x/week x 2 weeks with rolling walker at home with Home PT; with appropriate social support  cont current meds   d/c planning

## 2022-09-15 NOTE — PROGRESS NOTE ADULT - SUBJECTIVE AND OBJECTIVE BOX
Patient is a 77y old  Female who presents with a chief complaint of sepsis 2/2 UTI (15 Sep 2022 10:01)  Patient is awke, alert, lying in bed, not in acute distress on RA.    INTERVAL HPI/OVERNIGHT EVENTS:      VITAL SIGNS:  T(F): 98.1 (09-15-22 @ 07:23)  HR: 72 (09-15-22 @ 07:23)  BP: 139/53 (09-15-22 @ 07:23)  RR: 17 (09-15-22 @ 07:23)  SpO2: 94% (09-15-22 @ 07:23)  Wt(kg): --  I&O's Detail    14 Sep 2022 07:01  -  15 Sep 2022 07:00  --------------------------------------------------------  IN:    Oral Fluid: 200 mL  Total IN: 200 mL    OUT:  Total OUT: 0 mL    Total NET: 200 mL              REVIEW OF SYSTEMS:    CONSTITUTIONAL:  No fevers, chills, sweats    HEENT:  Eyes:  No diplopia or blurred vision. ENT:  No earache, sore throat or runny nose.    CARDIOVASCULAR:  No pressure, squeezing, tightness, or heaviness about the chest; no palpitations.    RESPIRATORY:  Per HPI    GASTROINTESTINAL:  No abdominal pain, nausea, vomiting or diarrhea.    GENITOURINARY:  No dysuria, frequency or urgency.    NEUROLOGIC:  No paresthesias, fasciculations, seizures or weakness.    PSYCHIATRIC:  No disorder of thought or mood.      PHYSICAL EXAM:    Constitutional: Well developed and nourished  Eyes:Perrla  ENMT: normal  Neck:supple  Respiratory: good air entry  Cardiovascular: S1 S2 regular  Gastrointestinal: Soft, Non tender  Extremities: No edema  Vascular:normal  Neurological:Awake, alert,Ox3  Musculoskeletal:Normal      MEDICATIONS  (STANDING):  amLODIPine   Tablet 5 milliGRAM(s) Oral daily  dextrose 5%. 1000 milliLiter(s) (50 mL/Hr) IV Continuous <Continuous>  dextrose 5%. 1000 milliLiter(s) (100 mL/Hr) IV Continuous <Continuous>  dextrose 50% Injectable 25 Gram(s) IV Push once  dextrose 50% Injectable 12.5 Gram(s) IV Push once  dextrose 50% Injectable 25 Gram(s) IV Push once  ertapenem  IVPB 1000 milliGRAM(s) IV Intermittent every 24 hours  glucagon  Injectable 1 milliGRAM(s) IntraMuscular once  heparin   Injectable 5000 Unit(s) SubCutaneous every 8 hours  insulin glargine Injectable (LANTUS) 10 Unit(s) SubCutaneous at bedtime  insulin lispro (ADMELOG) corrective regimen sliding scale   SubCutaneous three times a day before meals  insulin lispro (ADMELOG) corrective regimen sliding scale   SubCutaneous at bedtime  insulin lispro Injectable (ADMELOG) 3 Unit(s) SubCutaneous three times a day before meals  lactobacillus acidophilus 1 Tablet(s) Oral three times a day  levothyroxine 75 MICROGram(s) Oral daily  losartan 100 milliGRAM(s) Oral daily  metoprolol tartrate 12.5 milliGRAM(s) Oral two times a day  pantoprazole    Tablet 40 milliGRAM(s) Oral before breakfast    MEDICATIONS  (PRN):  acetaminophen     Tablet .. 650 milliGRAM(s) Oral every 6 hours PRN Temp greater or equal to 38C (100.4F), Mild Pain (1 - 3)  dextrose Oral Gel 15 Gram(s) Oral once PRN Blood Glucose LESS THAN 70 milliGRAM(s)/deciliter  guaiFENesin Oral Liquid (Sugar-Free) 200 milliGRAM(s) Oral every 6 hours PRN Cough  melatonin 3 milliGRAM(s) Oral at bedtime PRN Insomnia  ondansetron Injectable 4 milliGRAM(s) IV Push every 8 hours PRN Nausea and/or Vomiting      Allergies    No Known Allergies    Intolerances        LABS:                        10.4   13.29 )-----------( 647      ( 14 Sep 2022 06:20 )             31.0     09-14    138  |  101  |  17  ----------------------------<  193<H>  4.3   |  28  |  1.10    Ca    9.2      14 Sep 2022 06:20                CAPILLARY BLOOD GLUCOSE      POCT Blood Glucose.: 488 mg/dL (15 Sep 2022 08:00)  POCT Blood Glucose.: 215 mg/dL (14 Sep 2022 21:39)  POCT Blood Glucose.: 271 mg/dL (14 Sep 2022 20:28)  POCT Blood Glucose.: 182 mg/dL (14 Sep 2022 16:39)  POCT Blood Glucose.: 219 mg/dL (14 Sep 2022 11:33)        RADIOLOGY & ADDITIONAL TESTS:      POCT Blood Glucose.: 488 mg/dL (09.15.22 @ 08:00)   WBC Count: 13.29 K/uL (09.14.22 @ 06:20)   Hemoglobin: 10.4 g/dL (09.14.22 @ 06:20)     < from: CT Abdomen and Pelvis w/ IV Cont (09.14.22 @ 12:16) >  IMPRESSION:  Heterogeneous left renal enhancement and described above consistent with   pyelonephritis.    Interval improvement in previously seen bilateral perinephric fat   stranding, left greater than right.    Interval resolution of previously seen infiltration of the fat and fluid   in the right inguinal region.    Small left and trace right pleural effusions.    --- End of Report ---      < from: 12 Lead ECG (09.07.22 @ 14:13) >  Ventricular Rate 88 BPM    Atrial Rate 88 BPM    P-R Interval 166 ms    QRS Duration 74 ms    Q-T Interval 360 ms    QTC Calculation(Bazett) 435 ms    P Axis 41 degrees    R Axis -1 degrees    T Axis 12 degrees    Diagnosis Line Normal sinus rhythm  Minimal voltage criteria for LVH, may be normal variant  Nonspecific T wave abnormality  Abnormal ECG    < end of copied text >      < from: Transthoracic Echocardiogram (09.08.22 @ 07:02) >  CONCLUSIONS:  1. Normal mitral valve.  2. Normal trileaflet aortic valve.  3. Aortic Root: 2.7 cm.    4. Normal left atrium.  LA volume index = 18 cc/m2.  5. Normal left ventricular internal dimensions and wall  thicknesses.  6. Hyperdynamic left ventricular systolic function (EF  >70%).  7. Grade I diastolic dysfunction (Impaired relaxation,  mild).  8. Normal right atrium.  9. Normal right ventricular size and systolic function  (TAPSE  2.3cm).  10. RA Pressure is 8 mm Hg.  11. RV systolic pressure is mildly increased at  44 mm Hg.  12. Normal tricuspid valve.  13. Normal pulmonic valve.  14. Trivial pericardial effusion is seen.    < end of copied text >    Ct scan chest:    ekg;    echo: Patient is a 77y old  Female who presents with a chief complaint of sepsis 2/2 UTI (15 Sep 2022 10:01)  Patient is awke, alert, lying in bed, not in acute distress on RA. Clinically stable    INTERVAL HPI/OVERNIGHT EVENTS:      VITAL SIGNS:  T(F): 98.1 (09-15-22 @ 07:23)  HR: 72 (09-15-22 @ 07:23)  BP: 139/53 (09-15-22 @ 07:23)  RR: 17 (09-15-22 @ 07:23)  SpO2: 94% (09-15-22 @ 07:23)  Wt(kg): --  I&O's Detail    14 Sep 2022 07:01  -  15 Sep 2022 07:00  --------------------------------------------------------  IN:    Oral Fluid: 200 mL  Total IN: 200 mL    OUT:  Total OUT: 0 mL    Total NET: 200 mL              REVIEW OF SYSTEMS:    CONSTITUTIONAL:  No fevers, chills, sweats    HEENT:  Eyes:  No diplopia or blurred vision. ENT:  No earache, sore throat or runny nose.    CARDIOVASCULAR:  No pressure, squeezing, tightness, or heaviness about the chest; no palpitations.    RESPIRATORY:  Per HPI    GASTROINTESTINAL:  No abdominal pain, nausea, vomiting or diarrhea.    GENITOURINARY:  No dysuria, frequency or urgency.    NEUROLOGIC:  No paresthesias, fasciculations, seizures or weakness.    PSYCHIATRIC:  No disorder of thought or mood.      PHYSICAL EXAM:    Constitutional: Well developed and nourished  Eyes:Perrla  ENMT: normal  Neck:supple  Respiratory: good air entry  Cardiovascular: S1 S2 regular  Gastrointestinal: Soft, Non tender  Extremities: No edema  Vascular:normal  Neurological:Awake, alert,Ox3  Musculoskeletal:Normal      MEDICATIONS  (STANDING):  amLODIPine   Tablet 5 milliGRAM(s) Oral daily  dextrose 5%. 1000 milliLiter(s) (50 mL/Hr) IV Continuous <Continuous>  dextrose 5%. 1000 milliLiter(s) (100 mL/Hr) IV Continuous <Continuous>  dextrose 50% Injectable 25 Gram(s) IV Push once  dextrose 50% Injectable 12.5 Gram(s) IV Push once  dextrose 50% Injectable 25 Gram(s) IV Push once  ertapenem  IVPB 1000 milliGRAM(s) IV Intermittent every 24 hours  glucagon  Injectable 1 milliGRAM(s) IntraMuscular once  heparin   Injectable 5000 Unit(s) SubCutaneous every 8 hours  insulin glargine Injectable (LANTUS) 10 Unit(s) SubCutaneous at bedtime  insulin lispro (ADMELOG) corrective regimen sliding scale   SubCutaneous three times a day before meals  insulin lispro (ADMELOG) corrective regimen sliding scale   SubCutaneous at bedtime  insulin lispro Injectable (ADMELOG) 3 Unit(s) SubCutaneous three times a day before meals  lactobacillus acidophilus 1 Tablet(s) Oral three times a day  levothyroxine 75 MICROGram(s) Oral daily  losartan 100 milliGRAM(s) Oral daily  metoprolol tartrate 12.5 milliGRAM(s) Oral two times a day  pantoprazole    Tablet 40 milliGRAM(s) Oral before breakfast    MEDICATIONS  (PRN):  acetaminophen     Tablet .. 650 milliGRAM(s) Oral every 6 hours PRN Temp greater or equal to 38C (100.4F), Mild Pain (1 - 3)  dextrose Oral Gel 15 Gram(s) Oral once PRN Blood Glucose LESS THAN 70 milliGRAM(s)/deciliter  guaiFENesin Oral Liquid (Sugar-Free) 200 milliGRAM(s) Oral every 6 hours PRN Cough  melatonin 3 milliGRAM(s) Oral at bedtime PRN Insomnia  ondansetron Injectable 4 milliGRAM(s) IV Push every 8 hours PRN Nausea and/or Vomiting      Allergies    No Known Allergies    Intolerances        LABS:                        10.4   13.29 )-----------( 647      ( 14 Sep 2022 06:20 )             31.0     09-14    138  |  101  |  17  ----------------------------<  193<H>  4.3   |  28  |  1.10    Ca    9.2      14 Sep 2022 06:20                CAPILLARY BLOOD GLUCOSE      POCT Blood Glucose.: 488 mg/dL (15 Sep 2022 08:00)  POCT Blood Glucose.: 215 mg/dL (14 Sep 2022 21:39)  POCT Blood Glucose.: 271 mg/dL (14 Sep 2022 20:28)  POCT Blood Glucose.: 182 mg/dL (14 Sep 2022 16:39)  POCT Blood Glucose.: 219 mg/dL (14 Sep 2022 11:33)        RADIOLOGY & ADDITIONAL TESTS:      POCT Blood Glucose.: 488 mg/dL (09.15.22 @ 08:00)   WBC Count: 13.29 K/uL (09.14.22 @ 06:20)   Hemoglobin: 10.4 g/dL (09.14.22 @ 06:20)     < from: CT Abdomen and Pelvis w/ IV Cont (09.14.22 @ 12:16) >  IMPRESSION:  Heterogeneous left renal enhancement and described above consistent with   pyelonephritis.    Interval improvement in previously seen bilateral perinephric fat   stranding, left greater than right.    Interval resolution of previously seen infiltration of the fat and fluid   in the right inguinal region.    Small left and trace right pleural effusions.    --- End of Report ---      < from: 12 Lead ECG (09.07.22 @ 14:13) >  Ventricular Rate 88 BPM    Atrial Rate 88 BPM    P-R Interval 166 ms    QRS Duration 74 ms    Q-T Interval 360 ms    QTC Calculation(Bazett) 435 ms    P Axis 41 degrees    R Axis -1 degrees    T Axis 12 degrees    Diagnosis Line Normal sinus rhythm  Minimal voltage criteria for LVH, may be normal variant  Nonspecific T wave abnormality  Abnormal ECG    < end of copied text >      < from: Transthoracic Echocardiogram (09.08.22 @ 07:02) >  CONCLUSIONS:  1. Normal mitral valve.  2. Normal trileaflet aortic valve.  3. Aortic Root: 2.7 cm.    4. Normal left atrium.  LA volume index = 18 cc/m2.  5. Normal left ventricular internal dimensions and wall  thicknesses.  6. Hyperdynamic left ventricular systolic function (EF  >70%).  7. Grade I diastolic dysfunction (Impaired relaxation,  mild).  8. Normal right atrium.  9. Normal right ventricular size and systolic function  (TAPSE  2.3cm).  10. RA Pressure is 8 mm Hg.  11. RV systolic pressure is mildly increased at  44 mm Hg.  12. Normal tricuspid valve.  13. Normal pulmonic valve.  14. Trivial pericardial effusion is seen.    < end of copied text >    Ct scan chest:    ekg;    < from: US Duplex Venous Lower Ext Complete, Bilateral (09.08.22 @ 11:57) >  IMPRESSION:  No evidence of deep venous thrombosis in either lower extremity.      < end of copied text >  echo:

## 2022-09-15 NOTE — PROGRESS NOTE ADULT - SUBJECTIVE AND OBJECTIVE BOX
6cm 20 Gauge Extended Dwell Catheter inserted via the left basilic vein.  Good blood flow, dressing applied.  Maximum catheter dwell time is <29 days.

## 2022-09-15 NOTE — PROGRESS NOTE ADULT - SUBJECTIVE AND OBJECTIVE BOX
Patient is seen and examined at the bed side, is afebrile. The discharge plan noted.       REVIEW OF SYSTEMS: All other review systems are negative      ALLERGIES: No Known Allergies      Vital Signs Last 24 Hrs  T(C): 37.1 (15 Sep 2022 11:40), Max: 37.1 (15 Sep 2022 11:40)  T(F): 98.8 (15 Sep 2022 11:40), Max: 98.8 (15 Sep 2022 11:40)  HR: 84 (15 Sep 2022 11:40) (72 - 95)  BP: 114/50 (15 Sep 2022 11:40) (114/50 - 139/53)  BP(mean): --  RR: 18 (15 Sep 2022 11:40) (17 - 18)  SpO2: 97% (15 Sep 2022 11:40) (94% - 97%)    Parameters below as of 15 Sep 2022 11:40  Patient On (Oxygen Delivery Method): room air        PHYSICAL EXAM:  GENERAL: Not in distress   CHEST/LUNG: Not using accessory muscles   HEART: s1 and s2 present  ABDOMEN:  Nontender and  Nondistended  EXTREMITIES: No pedal  edema  CNS: Awake and Alert      LABS: No new labs                         10.4   13.29 )-----------( 647      ( 14 Sep 2022 06:20 )             31.0                         10.0   10.44 )-----------( 439      ( 12 Sep 2022 05:50 )             29.5                         11.0   16.66 )-----------( 269      ( 08 Sep 2022 05:30 )             30.9       09-14    138  |  101  |  17  ----------------------------<  193<H>  4.3   |  28  |  1.10    Ca    9.2      14 Sep 2022 06:20      09-12    139  |  105  |  12  ----------------------------<  201<H>  3.5   |  27  |  0.99    Ca    8.4      12 Sep 2022 05:50  Phos  1.5     09-12  Mg     1.8     09-12    TPro  5.6<L>  /  Alb  2.0<L>  /  TBili  0.4  /  DBili  x   /  AST  41<H>  /  ALT  49  /  AlkPhos  91        CAPILLARY BLOOD GLUCOSE  POCT Blood Glucose.: 235 mg/dL (08 Sep 2022 09:27)  POCT Blood Glucose.: 301 mg/dL (07 Sep 2022 13:56)        Urinalysis Basic - ( 07 Sep 2022 19:20 )  Color: Yellow / Appearance: Slightly Turbid / S.020 / pH: x  Gluc: x / Ketone: Negative  / Bili: Negative / Urobili: Negative   Blood: x / Protein: 100 / Nitrite: Negative   Leuk Esterase: Moderate / RBC: 5-10 /HPF / WBC >50 /HPF   Sq Epi: x / Non Sq Epi: Moderate /HPF / Bacteria: Many /HPF        MEDICATIONS  (STANDING):    amLODIPine   Tablet 5 milliGRAM(s) Oral daily  ertapenem  IVPB 1000 milliGRAM(s) IV Intermittent every 24 hours  glucagon  Injectable 1 milliGRAM(s) IntraMuscular once  heparin   Injectable 5000 Unit(s) SubCutaneous every 8 hours  insulin glargine Injectable (LANTUS) 10 Unit(s) SubCutaneous at bedtime  insulin lispro (ADMELOG) corrective regimen sliding scale   SubCutaneous three times a day before meals  insulin lispro (ADMELOG) corrective regimen sliding scale   SubCutaneous at bedtime  insulin lispro Injectable (ADMELOG) 3 Unit(s) SubCutaneous three times a day before meals  lactobacillus acidophilus 1 Tablet(s) Oral three times a day  levothyroxine 75 MICROGram(s) Oral daily  losartan 100 milliGRAM(s) Oral daily  metoprolol tartrate 12.5 milliGRAM(s) Oral two times a day  pantoprazole    Tablet 40 milliGRAM(s) Oral before breakfast        RADIOLOGY & ADDITIONAL TESTS:    22: CT Abdomen and Pelvis w/ IV Cont (22 @ 12:16) KIDNEYS/URETERS: Heterogeneous left renal enhancement including   peripheral areas of decreased enhancement consistent with pyelonephritis. Interval improvement in previously seen bilateral perinephric fat   stranding, left greater than right. No hydronephrosis. Subcentimeter  low-attenuation lesions in the kidneys, too small to characterize.    BLADDER: Distended with a normal caliber wall. REPRODUCTIVE ORGANS: Hysterectomy.    RETROPERITONEUM/LYMPH NODES: Multiple small lymph nodes at the anterior   aspect of the left kidney/left para-aortic region, likely reactive.  ABDOMINAL WALL: Previously seen fat stranding and fluid in the right inguinal region has resolved. Small fat-containing umbilical hernia.  BONES: Mild anterolisthesis of L4 on L5. No aggressive osseous lesion.    < end of copied text >      22 : US Duplex Venous Lower Ext Complete, Bilateral (22 @ 11:57) No evidence of deep venous thrombosis in either lower extremity.      22: CT Abdomen and Pelvis No Cont (22 @ 09:14) >Evaluation of the solid organs, vascular structures and GI tract is   limited without oral and IV contrast.  Perinephric edema suggestive of bilateral pyelonephritis. Small ascites  and small bilateral pleural effusions.    Right inguinal cystic structure with surrounding edema may represent  abscess or infected plug from prior hernia repair. If DVT is suspected,   recommend ultrasound Doppler evaluation.    Findings were discussed with LUIS DANIEL DODGE [id number] 2022 10:57  AM by Dr. Brianna Roy with read back confirmation.      22 : Xray Chest 1 View- PORTABLE-Urgent (22 @ 17:02) IMPRESSION: No acute finding or change.      MICROBIOLOGY  DATA:    Culture - Blood (09.10.22 @ 12:20)   Specimen Source: .Blood Blood   Culture Results: No growth to date.     Culture - Urine (22 @ 13:25)   Specimen Source: Catheterized Catheterized   Culture Results: >100,000 CFU/ml Escherichia coli     GI PCR Panel Stool (22 @ 19:20)   GI PCR Panel: Detected:   Shiga-like toxin-producing E.coli (STEC) stx1/stx2: Detected     Clostridium difficile Toxin by PCR (22 @ 12:30)   C Diff by PCR Result: NotDetec     Culture - Blood (22 @ 16:57)   Specimen Source: .Blood Blood   Culture Results: No growth to date.     Culture - Blood (22 @ 16:50)   - ESBL: Detec   - Escherichia coli: Detec   - CTX-M Resistance Marker: Detec   - Trimethoprim/Sulfamethoxazole: R >   Gram Stain:   Growth in aerobic bottle: Gram Negative Rods   - Amikacin: S <=16   - Ampicillin: R >16 These ampicillin results predict results for amoxicillin   - Ampicillin/Sulbactam: R >16/8 Enterobacter, Klebsiella aerogenes, Citrobacter, and Serratia may develop resistance during prolonged therapy (3-4 days)   - Aztreonam: R >16   - Cefazolin: R >16 Enterobacter, Klebsiella aerogenes, Citrobacter, and Serratia may develop resistance during prolonged therapy (3-4 days)   - Cefepime: R >16   - Ceftriaxone: R >32 Enterobacter, Klebsiella aerogenes, Citrobacter, and Serratia may develop resistance during prolonged therapy   - Ciprofloxacin: R >2   - Ertapenem: S <=0.5   - Gentamicin: R >8   - Imipenem: S <=1   - Levofloxacin: R >4   - Meropenem: S <=1   - Piperacillin/Tazobactam: R <=8   - Tobramycin: R >8   Specimen Source: .Blood Blood   Organism: Blood Culture PCR   Organism: Escherichia coli ESBL   Culture Results:   Growth in aerobic bottle: Escherichia coli ESBL   Rapid HIV-1/2 Antibody (22 @ 16:10)   Rapid HIV-1/2 Antibody: Nonreact:     COVID-19 PCR (22 @ 16:10)   COVID-19 PCR: NotDetec:               Patient is seen and examined at the bed side, is afebrile. The discharge plan noted, scheduled for Midline placement.      REVIEW OF SYSTEMS: All other review systems are negative      ALLERGIES: No Known Allergies      Vital Signs Last 24 Hrs  T(C): 37.1 (15 Sep 2022 11:40), Max: 37.1 (15 Sep 2022 11:40)  T(F): 98.8 (15 Sep 2022 11:40), Max: 98.8 (15 Sep 2022 11:40)  HR: 84 (15 Sep 2022 11:40) (72 - 95)  BP: 114/50 (15 Sep 2022 11:40) (114/50 - 139/53)  BP(mean): --  RR: 18 (15 Sep 2022 11:40) (17 - 18)  SpO2: 97% (15 Sep 2022 11:40) (94% - 97%)    Parameters below as of 15 Sep 2022 11:40  Patient On (Oxygen Delivery Method): room air        PHYSICAL EXAM:  GENERAL: Not in distress   CHEST/LUNG: Not using accessory muscles   HEART: s1 and s2 present  ABDOMEN:  Nontender and  Nondistended  EXTREMITIES: No pedal  edema  CNS: Awake and Alert      LABS: No new labs                         10.4   13.29 )-----------( 647      ( 14 Sep 2022 06:20 )             31.0                         10.0   10.44 )-----------( 439      ( 12 Sep 2022 05:50 )             29.5                         11.0   16.66 )-----------( 269      ( 08 Sep 2022 05:30 )             30.9       -14    138  |  101  |  17  ----------------------------<  193<H>  4.3   |  28  |  1.10    Ca    9.2      14 Sep 2022 06:20      09-12    139  |  105  |  12  ----------------------------<  201<H>  3.5   |  27  |  0.99    Ca    8.4      12 Sep 2022 05:50  Phos  1.5     09-12  Mg     1.8     09-12    TPro  5.6<L>  /  Alb  2.0<L>  /  TBili  0.4  /  DBili  x   /  AST  41<H>  /  ALT  49  /  AlkPhos  91  -      CAPILLARY BLOOD GLUCOSE  POCT Blood Glucose.: 235 mg/dL (08 Sep 2022 09:27)  POCT Blood Glucose.: 301 mg/dL (07 Sep 2022 13:56)        Urinalysis Basic - ( 07 Sep 2022 19:20 )  Color: Yellow / Appearance: Slightly Turbid / S.020 / pH: x  Gluc: x / Ketone: Negative  / Bili: Negative / Urobili: Negative   Blood: x / Protein: 100 / Nitrite: Negative   Leuk Esterase: Moderate / RBC: 5-10 /HPF / WBC >50 /HPF   Sq Epi: x / Non Sq Epi: Moderate /HPF / Bacteria: Many /HPF        MEDICATIONS  (STANDING):    amLODIPine   Tablet 5 milliGRAM(s) Oral daily  ertapenem  IVPB 1000 milliGRAM(s) IV Intermittent every 24 hours  glucagon  Injectable 1 milliGRAM(s) IntraMuscular once  heparin   Injectable 5000 Unit(s) SubCutaneous every 8 hours  insulin glargine Injectable (LANTUS) 10 Unit(s) SubCutaneous at bedtime  insulin lispro (ADMELOG) corrective regimen sliding scale   SubCutaneous three times a day before meals  insulin lispro (ADMELOG) corrective regimen sliding scale   SubCutaneous at bedtime  insulin lispro Injectable (ADMELOG) 3 Unit(s) SubCutaneous three times a day before meals  lactobacillus acidophilus 1 Tablet(s) Oral three times a day  levothyroxine 75 MICROGram(s) Oral daily  losartan 100 milliGRAM(s) Oral daily  metoprolol tartrate 12.5 milliGRAM(s) Oral two times a day  pantoprazole    Tablet 40 milliGRAM(s) Oral before breakfast        RADIOLOGY & ADDITIONAL TESTS:    22: CT Abdomen and Pelvis w/ IV Cont (22 @ 12:16) KIDNEYS/URETERS: Heterogeneous left renal enhancement including   peripheral areas of decreased enhancement consistent with pyelonephritis. Interval improvement in previously seen bilateral perinephric fat   stranding, left greater than right. No hydronephrosis. Subcentimeter  low-attenuation lesions in the kidneys, too small to characterize.    BLADDER: Distended with a normal caliber wall. REPRODUCTIVE ORGANS: Hysterectomy.    RETROPERITONEUM/LYMPH NODES: Multiple small lymph nodes at the anterior   aspect of the left kidney/left para-aortic region, likely reactive.  ABDOMINAL WALL: Previously seen fat stranding and fluid in the right inguinal region has resolved. Small fat-containing umbilical hernia.  BONES: Mild anterolisthesis of L4 on L5. No aggressive osseous lesion.    < end of copied text >      22 : US Duplex Venous Lower Ext Complete, Bilateral (22 @ 11:57) No evidence of deep venous thrombosis in either lower extremity.      22: CT Abdomen and Pelvis No Cont (22 @ 09:14) >Evaluation of the solid organs, vascular structures and GI tract is   limited without oral and IV contrast.  Perinephric edema suggestive of bilateral pyelonephritis. Small ascites  and small bilateral pleural effusions.    Right inguinal cystic structure with surrounding edema may represent  abscess or infected plug from prior hernia repair. If DVT is suspected,   recommend ultrasound Doppler evaluation.    Findings were discussed with LUIS DANIEL DODGE [id number] 2022 10:57  AM by Dr. Brianna Roy with read back confirmation.      22 : Xray Chest 1 View- PORTABLE-Urgent (22 @ 17:02) IMPRESSION: No acute finding or change.      MICROBIOLOGY  DATA:    Culture - Blood (09.10.22 @ 12:20)   Specimen Source: .Blood Blood   Culture Results: No growth to date.     Culture - Urine (22 @ 13:25)   Specimen Source: Catheterized Catheterized   Culture Results: >100,000 CFU/ml Escherichia coli     GI PCR Panel Stool (22 @ 19:20)   GI PCR Panel: Detected:   Shiga-like toxin-producing E.coli (STEC) stx1/stx2: Detected     Clostridium difficile Toxin by PCR (22 @ 12:30)   C Diff by PCR Result: NotDetec     Culture - Blood (22 @ 16:57)   Specimen Source: .Blood Blood   Culture Results: No growth to date.     Culture - Blood (22 @ 16:50)   - ESBL: Detec   - Escherichia coli: Detec   - CTX-M Resistance Marker: Detec   - Trimethoprim/Sulfamethoxazole: R >   Gram Stain:   Growth in aerobic bottle: Gram Negative Rods   - Amikacin: S <=16   - Ampicillin: R >16 These ampicillin results predict results for amoxicillin   - Ampicillin/Sulbactam: R >16/8 Enterobacter, Klebsiella aerogenes, Citrobacter, and Serratia may develop resistance during prolonged therapy (3-4 days)   - Aztreonam: R >16   - Cefazolin: R >16 Enterobacter, Klebsiella aerogenes, Citrobacter, and Serratia may develop resistance during prolonged therapy (3-4 days)   - Cefepime: R >16   - Ceftriaxone: R >32 Enterobacter, Klebsiella aerogenes, Citrobacter, and Serratia may develop resistance during prolonged therapy   - Ciprofloxacin: R >2   - Ertapenem: S <=0.5   - Gentamicin: R >8   - Imipenem: S <=1   - Levofloxacin: R >4   - Meropenem: S <=1   - Piperacillin/Tazobactam: R <=8   - Tobramycin: R >8   Specimen Source: .Blood Blood   Organism: Blood Culture PCR   Organism: Escherichia coli ESBL   Culture Results:   Growth in aerobic bottle: Escherichia coli ESBL   Rapid HIV-1/2 Antibody (22 @ 16:10)   Rapid HIV-1/2 Antibody: Nonreact:     COVID-19 PCR (22 @ 16:10)   COVID-19 PCR: NotDetec:

## 2022-09-15 NOTE — PROGRESS NOTE ADULT - ASSESSMENT
78 y/o F from home w/ PMHx of HTN, DM, HLD, Hypothyroidism, s/p cholecystectomy, s/p hysterectomy. She had been having generalized weakness, dizziness and dysuria for the past 3 days,diarrhea,CALLY,UTI,abnormal EKG,E coli bacteremia.  1.PT.  2.Diarrhea-E coli bacteremia-ABX.  3.CALLY-resolved  4.Repeat blood cx-negative.  5.Hypothyroidism-synthroid.  6.DM-Insulin.  7.GI and DVT prophylaxis.

## 2022-09-15 NOTE — PROGRESS NOTE ADULT - SUBJECTIVE AND OBJECTIVE BOX
Patient is seen and examined at the bed side, is afebrile. The discharge plan noted.       REVIEW OF SYSTEMS: All other review systems are negative      ALLERGIES: No Known Allergies      Vital Signs Last 24 Hrs  T(C): 37.1 (15 Sep 2022 11:40), Max: 37.1 (15 Sep 2022 11:40)  T(F): 98.8 (15 Sep 2022 11:40), Max: 98.8 (15 Sep 2022 11:40)  HR: 84 (15 Sep 2022 11:40) (72 - 95)  BP: 114/50 (15 Sep 2022 11:40) (114/50 - 139/53)  BP(mean): --  RR: 18 (15 Sep 2022 11:40) (17 - 18)  SpO2: 97% (15 Sep 2022 11:40) (94% - 97%)    Parameters below as of 15 Sep 2022 11:40  Patient On (Oxygen Delivery Method): room air        PHYSICAL EXAM:  GENERAL: Not in distress   CHEST/LUNG: Not using accessory muscles   HEART: s1 and s2 present  ABDOMEN:  Nontender and  Nondistended  EXTREMITIES: No pedal  edema  CNS: Awake and Alert      LABS: No new labs                         10.4   13.29 )-----------( 647      ( 14 Sep 2022 06:20 )             31.0                         10.0   10.44 )-----------( 439      ( 12 Sep 2022 05:50 )             29.5                         11.0   16.66 )-----------( 269      ( 08 Sep 2022 05:30 )             30.9       09-14    138  |  101  |  17  ----------------------------<  193<H>  4.3   |  28  |  1.10    Ca    9.2      14 Sep 2022 06:20      09-12    139  |  105  |  12  ----------------------------<  201<H>  3.5   |  27  |  0.99    Ca    8.4      12 Sep 2022 05:50  Phos  1.5     09-12  Mg     1.8     09-12    TPro  5.6<L>  /  Alb  2.0<L>  /  TBili  0.4  /  DBili  x   /  AST  41<H>  /  ALT  49  /  AlkPhos  91        CAPILLARY BLOOD GLUCOSE  POCT Blood Glucose.: 235 mg/dL (08 Sep 2022 09:27)  POCT Blood Glucose.: 301 mg/dL (07 Sep 2022 13:56)        Urinalysis Basic - ( 07 Sep 2022 19:20 )  Color: Yellow / Appearance: Slightly Turbid / S.020 / pH: x  Gluc: x / Ketone: Negative  / Bili: Negative / Urobili: Negative   Blood: x / Protein: 100 / Nitrite: Negative   Leuk Esterase: Moderate / RBC: 5-10 /HPF / WBC >50 /HPF   Sq Epi: x / Non Sq Epi: Moderate /HPF / Bacteria: Many /HPF        MEDICATIONS  (STANDING):    amLODIPine   Tablet 5 milliGRAM(s) Oral daily  ertapenem  IVPB 1000 milliGRAM(s) IV Intermittent every 24 hours  glucagon  Injectable 1 milliGRAM(s) IntraMuscular once  heparin   Injectable 5000 Unit(s) SubCutaneous every 8 hours  insulin glargine Injectable (LANTUS) 10 Unit(s) SubCutaneous at bedtime  insulin lispro (ADMELOG) corrective regimen sliding scale   SubCutaneous three times a day before meals  insulin lispro (ADMELOG) corrective regimen sliding scale   SubCutaneous at bedtime  insulin lispro Injectable (ADMELOG) 3 Unit(s) SubCutaneous three times a day before meals  lactobacillus acidophilus 1 Tablet(s) Oral three times a day  levothyroxine 75 MICROGram(s) Oral daily  losartan 100 milliGRAM(s) Oral daily  metoprolol tartrate 12.5 milliGRAM(s) Oral two times a day  pantoprazole    Tablet 40 milliGRAM(s) Oral before breakfast        RADIOLOGY & ADDITIONAL TESTS:    22: CT Abdomen and Pelvis w/ IV Cont (22 @ 12:16) KIDNEYS/URETERS: Heterogeneous left renal enhancement including   peripheral areas of decreased enhancement consistent with pyelonephritis. Interval improvement in previously seen bilateral perinephric fat   stranding, left greater than right. No hydronephrosis. Subcentimeter  low-attenuation lesions in the kidneys, too small to characterize.    BLADDER: Distended with a normal caliber wall. REPRODUCTIVE ORGANS: Hysterectomy.    RETROPERITONEUM/LYMPH NODES: Multiple small lymph nodes at the anterior   aspect of the left kidney/left para-aortic region, likely reactive.  ABDOMINAL WALL: Previously seen fat stranding and fluid in the right inguinal region has resolved. Small fat-containing umbilical hernia.  BONES: Mild anterolisthesis of L4 on L5. No aggressive osseous lesion.    < end of copied text >      22 : US Duplex Venous Lower Ext Complete, Bilateral (22 @ 11:57) No evidence of deep venous thrombosis in either lower extremity.      22: CT Abdomen and Pelvis No Cont (22 @ 09:14) >Evaluation of the solid organs, vascular structures and GI tract is   limited without oral and IV contrast.  Perinephric edema suggestive of bilateral pyelonephritis. Small ascites  and small bilateral pleural effusions.    Right inguinal cystic structure with surrounding edema may represent  abscess or infected plug from prior hernia repair. If DVT is suspected,   recommend ultrasound Doppler evaluation.    Findings were discussed with LUIS DANIEL DODGE [phone] 2022 10:57  AM by Dr. Brianna Roy with read back confirmation.      22 : Xray Chest 1 View- PORTABLE-Urgent (22 @ 17:02) IMPRESSION: No acute finding or change.      MICROBIOLOGY  DATA:    Culture - Blood (09.10.22 @ 12:20)   Specimen Source: .Blood Blood   Culture Results: No growth to date.     Culture - Urine (22 @ 13:25)   Specimen Source: Catheterized Catheterized   Culture Results: >100,000 CFU/ml Escherichia coli     GI PCR Panel Stool (22 @ 19:20)   GI PCR Panel: Detected:   Shiga-like toxin-producing E.coli (STEC) stx1/stx2: Detected     Clostridium difficile Toxin by PCR (22 @ 12:30)   C Diff by PCR Result: NotDetec     Culture - Blood (22 @ 16:57)   Specimen Source: .Blood Blood   Culture Results: No growth to date.     Culture - Blood (22 @ 16:50)   - ESBL: Detec   - Escherichia coli: Detec   - CTX-M Resistance Marker: Detec   - Trimethoprim/Sulfamethoxazole: R >   Gram Stain:   Growth in aerobic bottle: Gram Negative Rods   - Amikacin: S <=16   - Ampicillin: R >16 These ampicillin results predict results for amoxicillin   - Ampicillin/Sulbactam: R >16/8 Enterobacter, Klebsiella aerogenes, Citrobacter, and Serratia may develop resistance during prolonged therapy (3-4 days)   - Aztreonam: R >16   - Cefazolin: R >16 Enterobacter, Klebsiella aerogenes, Citrobacter, and Serratia may develop resistance during prolonged therapy (3-4 days)   - Cefepime: R >16   - Ceftriaxone: R >32 Enterobacter, Klebsiella aerogenes, Citrobacter, and Serratia may develop resistance during prolonged therapy   - Ciprofloxacin: R >2   - Ertapenem: S <=0.5   - Gentamicin: R >8   - Imipenem: S <=1   - Levofloxacin: R >4   - Meropenem: S <=1   - Piperacillin/Tazobactam: R <=8   - Tobramycin: R >8   Specimen Source: .Blood Blood   Organism: Blood Culture PCR   Organism: Escherichia coli ESBL   Culture Results:   Growth in aerobic bottle: Escherichia coli ESBL   Rapid HIV-1/2 Antibody (22 @ 16:10)   Rapid HIV-1/2 Antibody: Nonreact:     COVID-19 PCR (22 @ 16:10)   COVID-19 PCR: NotDetec:

## 2022-09-15 NOTE — PROGRESS NOTE ADULT - PROBLEM SELECTOR PROBLEM 12
Discharge planning issues
Diarrhea
Discharge planning issues
Hypothyroidism
Discharge planning issues
Diarrhea
Discharge planning issues

## 2022-09-15 NOTE — PROGRESS NOTE ADULT - ASSESSMENT
Patient is a 77y old  Female from home w/ PMHx of HTN, DM, HLD, Hypothyroidism, s/p cholecystectomy, s/p hysterectomy, now presents to the ER for evaluation of generalized weakness, dizziness and dysuria for the past 3 days, watery diarrhea and associated with vomiting  and lower abdominal pain. She has dizziness causing her to fall and hit her face. On admission, she found to have fever, tachycardia, Leukocytosis and positive Urine analysis. The CT abd/pelvis shows  Perinephric edema suggestive of bilateral pyelonephritis and Right inguinal cystic structure with surrounding edema may represent  abscess or infected plug from prior hernia repair. She has started on Ceftriaxone and The ID consult requested to assist with further evaluation and antibiotic management,     # Sepsis ( Fever + tachycardia + Leukocytosis )- resolving  # UTI - WBC >50 /HPF Sq Epi: x / Non Sq Epi: Moderate /HPF / Bacteria: Many /HPF- Urine  Cx  from 9/9 grew  E.coli  # Bilateral pyelonephritis  # Right inguinal suspected abscess- The repeat CT abd/pelvis from 9/14/22 shows  Interval improvement in previously seen bilateral perinephric fat   stranding, and  Previously seen fat stranding and fluid in the right inguinal region has resolved.  # ESBL  E.coli Bacteremia- 9/7/22 - source most likely - Repeat Blood culture from 9/10/22 is negative to date   # Shiga-like toxin-producing E.coli STEC - detected on GI PCR panel    would recommend:    1. PICC/MID Line  2. Continue Ertapenem 1 g daily to cover ESBL  E.coli Bacteremia, UTI and Pyelonephritis, until 9/24/22  3. Monitor CBC, BMP, ESR and CRP weekly until 9/24/22  4. OOB to chair     d/w  Covering NP,  Louis Esposito    - ID follow up with Dr.Jordan Dickens via Telehealth [phone] and Fax [phone]    Attending Attestation:    Spent more than 35 minutes on total encounter, more than 50 % of the visit was spent counseling and/or coordinating care by the Attending physician.

## 2022-09-15 NOTE — PROGRESS NOTE ADULT - PROBLEM/PLAN-9
DISPLAY PLAN FREE TEXT
Yes

## 2022-09-15 NOTE — PROGRESS NOTE ADULT - REASON FOR ADMISSION
sepsis 2/2 UTI

## 2022-09-15 NOTE — PROGRESS NOTE ADULT - ASSESSMENT
Patient is a 77y old  Female from home w/ PMHx of HTN, DM, HLD, Hypothyroidism, s/p cholecystectomy, s/p hysterectomy, now presents to the ER for evaluation of generalized weakness, dizziness and dysuria for the past 3 days, watery diarrhea and associated with vomiting  and lower abdominal pain. She has dizziness causing her to fall and hit her face. On admission, she found to have fever, tachycardia, Leukocytosis and positive Urine analysis. The CT abd/pelvis shows  Perinephric edema suggestive of bilateral pyelonephritis and Right inguinal cystic structure with surrounding edema may represent  abscess or infected plug from prior hernia repair. She has started on Ceftriaxone and The ID consult requested to assist with further evaluation and antibiotic management,     # Sepsis ( Fever + tachycardia + Leukocytosis )- resolving  # UTI - WBC >50 /HPF Sq Epi: x / Non Sq Epi: Moderate /HPF / Bacteria: Many /HPF- Urine  Cx  from 9/9 grew  E.coli  # Bilateral pyelonephritis  # Right inguinal suspected abscess- The repeat CT abd/pelvis from 9/14/22 shows  Interval improvement in previously seen bilateral perinephric fat   stranding, and  Previously seen fat stranding and fluid in the right inguinal region has resolved.  # ESBL  E.coli Bacteremia- 9/7/22 - source most likely - Repeat Blood culture from 9/10/22 is negative to date   # Shiga-like toxin-producing E.coli STEC - detected on GI PCR panel    would recommend:    1. PICC/MID Line  2. Continue Ertapenem 1 g daily to cover ESBL  E.coli Bacteremia, UTI and Pyelonephritis, until 9/24/22  3. Monitor CBC, BMP, ESR and CRP weekly until 9/24/22  4. OOB to chair     d/w  Covering NP,  Louis Esposito    - ID follow up with Dr.Jordan Dickens via Telehealth [phone] and Fax [phone]    Attending Attestation:    Spent more than 35 minutes on total encounter, more than 50 % of the visit was spent counseling and/or coordinating care by the Attending physician.

## 2022-09-15 NOTE — PROGRESS NOTE ADULT - PROVIDER SPECIALTY LIST ADULT
Cardiology
Infectious Disease
Internal Medicine
Internal Medicine
Intervent Radiology
Cardiology
Cardiology
Infectious Disease
Internal Medicine
Internal Medicine
Pulmonology
Cardiology
Cardiology
Internal Medicine
Surgery
Internal Medicine
Pulmonology
Pulmonology
Internal Medicine
Pulmonology
Pulmonology
Internal Medicine

## 2022-09-15 NOTE — PROGRESS NOTE ADULT - PROBLEM SELECTOR PROBLEM 1
Sepsis secondary to UTI

## 2022-09-16 NOTE — PATIENT PROFILE ADULT - FUNCTIONAL ASSESSMENT - DAILY ACTIVITY SECTION LABEL
.
Pt states he was sent over by hematologist for a K+ level of 7.0. Pt denies CP, palpitations, and SOB. Pt in no acute distress.

## 2023-03-27 NOTE — ED PROVIDER NOTE - ENMT NEGATIVE STATEMENT, MLM
Physical Therapy Visit    Visit Type: Daily Treatment Note  Visit: 4  Referring Provider: Clive Sotelo MD  Medical Diagnosis (from order): Diagnosis Information    Diagnosis  724.2 (ICD-9-CM) - M54.59 (ICD-10-CM) - Other low back pain  724.1 (ICD-9-CM) - M54.6 (ICD-10-CM) - Pain in thoracic spine       Patient alert and oriented X3.    SUBJECTIVE                                                                                                               Pt states that she is still a little sore in her L upper trap, but not as much as last visit.     Pain / Symptoms  - Pain rating (out of 10): Current: 6       OBJECTIVE                                                                                                                                       Treatment     Therapeutic Exercise  - Nustep [id number]  - Seated stretch/pull lat pulldown 5x15\" B   - LS 3 way stretches over SB x20 forward, left  - Standing unilateral row 10x15\" at freemotion 10#  - Standing strict rotations x15 B @ freemotion 10#  - TS ext w/ L rot over chair w/ backrest x15    Skilled input: verbal instruction/cues and tactile instruction/cues    Writer verbally educated and received verbal consent for hand placement, positioning of patient, and techniques to be performed today from patient         ASSESSMENT                                                                                                            Pt required tactile and verbal cues for completion of new exercises. No adverse events or reactions were reported by pt or observed by PT. Plan to discharge after next visit. She will benefit from skilled PT to improve suspected anatomic malalignments and posture to reduce pain and discomfort w/ ADLs and work.  Pain/symptoms after session (out of 10): 5  Education:   - Present and ready to learn: patient  - Results of above outlined education: Verbalizes understanding and Demonstrates understanding    PLAN                                                                                                                            Suggestions for next session as indicated: Progress per plan of care       Therapy procedure time and total treatment time can be found documented on the Time Entry flowsheet     Ears: no ear pain and no hearing problems.Nose: no nasal congestion and no nasal drainage.Mouth/Throat: no dysphagia, no hoarseness and no throat pain.Neck: no lumps, no pain, no stiffness and no swollen glands.

## 2023-04-11 NOTE — DIETITIAN INITIAL EVALUATION ADULT - NSPROEDAREADYLEARN_GEN_A_NUR
-- DO NOT REPLY / DO NOT REPLY ALL --  -- Message is from Engagement Center Operations ([facility]) --    General Patient Message: Pt states she received a Diflucan pill from Dr. Emma Mederos on her last visit and she has lost it. Pt is requesting another dose. Please call pt at your earliest convenience. Thank you.     Caller Information       Type Contact Phone/Fax    04/11/2023 01:07 PM CDT Phone (Incoming) Natacha Leal (Self) [phone] (M)        Alternative phone number: no    Can a detailed message be left? Yes    Message Turnaround:     Is it Working Hours? Yes - Working Hours     [place]:    Please give this turnaround time to the caller:   \"This message will be sent to [state Provider's name]. The clinical team will fulfill your request as soon as they review your message.\"                 acuteness of illness

## 2023-05-12 NOTE — PROGRESS NOTE ADULT - PROBLEM SELECTOR PLAN 4
p/w BUN/SCr - 28/2.09   likely pre-renal   s/p IV fluids  monitor BMP  avoid nephrotoxic substances  resolved Dutasteride Pregnancy And Lactation Text: This medication is absolutely contraindicated in women, especially during pregnancy and breast feeding. Feminization of male fetuses is possible if taking while pregnant.

## 2023-07-09 ENCOUNTER — EMERGENCY (EMERGENCY)
Facility: HOSPITAL | Age: 78
LOS: 1 days | Discharge: HOME | End: 2023-07-09
Attending: STUDENT IN AN ORGANIZED HEALTH CARE EDUCATION/TRAINING PROGRAM
Payer: COMMERCIAL

## 2023-07-09 VITALS
HEART RATE: 95 BPM | TEMPERATURE: 98 F | WEIGHT: 119.05 LBS | RESPIRATION RATE: 20 BRPM | SYSTOLIC BLOOD PRESSURE: 172 MMHG | OXYGEN SATURATION: 100 % | DIASTOLIC BLOOD PRESSURE: 83 MMHG | HEIGHT: 61 IN

## 2023-07-09 VITALS — HEART RATE: 81 BPM

## 2023-07-09 LAB
ALBUMIN SERPL ELPH-MCNC: 3.6 G/DL — SIGNIFICANT CHANGE UP (ref 3.5–5)
ALP SERPL-CCNC: 74 U/L — SIGNIFICANT CHANGE UP (ref 40–120)
ALT FLD-CCNC: 20 U/L DA — SIGNIFICANT CHANGE UP (ref 10–60)
ANION GAP SERPL CALC-SCNC: 10 MMOL/L — SIGNIFICANT CHANGE UP (ref 5–17)
APPEARANCE UR: CLEAR — SIGNIFICANT CHANGE UP
APTT BLD: 31.3 SEC — SIGNIFICANT CHANGE UP (ref 27.5–35.5)
AST SERPL-CCNC: 14 U/L — SIGNIFICANT CHANGE UP (ref 10–40)
BACTERIA # UR AUTO: ABNORMAL /HPF
BASOPHILS # BLD AUTO: 0.08 K/UL — SIGNIFICANT CHANGE UP (ref 0–0.2)
BASOPHILS NFR BLD AUTO: 0.8 % — SIGNIFICANT CHANGE UP (ref 0–2)
BILIRUB SERPL-MCNC: 0.3 MG/DL — SIGNIFICANT CHANGE UP (ref 0.2–1.2)
BILIRUB UR-MCNC: NEGATIVE — SIGNIFICANT CHANGE UP
BUN SERPL-MCNC: 16 MG/DL — SIGNIFICANT CHANGE UP (ref 7–18)
CALCIUM SERPL-MCNC: 9.5 MG/DL — SIGNIFICANT CHANGE UP (ref 8.4–10.5)
CHLORIDE SERPL-SCNC: 96 MMOL/L — SIGNIFICANT CHANGE UP (ref 96–108)
CO2 SERPL-SCNC: 24 MMOL/L — SIGNIFICANT CHANGE UP (ref 22–31)
COLOR SPEC: YELLOW — SIGNIFICANT CHANGE UP
CREAT SERPL-MCNC: 1.26 MG/DL — SIGNIFICANT CHANGE UP (ref 0.5–1.3)
DIFF PNL FLD: ABNORMAL
EGFR: 44 ML/MIN/1.73M2 — LOW
EOSINOPHIL # BLD AUTO: 0.05 K/UL — SIGNIFICANT CHANGE UP (ref 0–0.5)
EOSINOPHIL NFR BLD AUTO: 0.5 % — SIGNIFICANT CHANGE UP (ref 0–6)
EPI CELLS # UR: ABNORMAL /HPF
GLUCOSE SERPL-MCNC: 148 MG/DL — HIGH (ref 70–99)
GLUCOSE UR QL: NEGATIVE — SIGNIFICANT CHANGE UP
HCT VFR BLD CALC: 37.5 % — SIGNIFICANT CHANGE UP (ref 34.5–45)
HGB BLD-MCNC: 12.8 G/DL — SIGNIFICANT CHANGE UP (ref 11.5–15.5)
HYALINE CASTS # UR AUTO: ABNORMAL /LPF
IMM GRANULOCYTES NFR BLD AUTO: 0.5 % — SIGNIFICANT CHANGE UP (ref 0–0.9)
INR BLD: 0.93 RATIO — SIGNIFICANT CHANGE UP (ref 0.88–1.16)
KETONES UR-MCNC: ABNORMAL
LEUKOCYTE ESTERASE UR-ACNC: NEGATIVE — SIGNIFICANT CHANGE UP
LYMPHOCYTES # BLD AUTO: 2.22 K/UL — SIGNIFICANT CHANGE UP (ref 1–3.3)
LYMPHOCYTES # BLD AUTO: 21.2 % — SIGNIFICANT CHANGE UP (ref 13–44)
MCHC RBC-ENTMCNC: 29.2 PG — SIGNIFICANT CHANGE UP (ref 27–34)
MCHC RBC-ENTMCNC: 34.1 GM/DL — SIGNIFICANT CHANGE UP (ref 32–36)
MCV RBC AUTO: 85.6 FL — SIGNIFICANT CHANGE UP (ref 80–100)
MONOCYTES # BLD AUTO: 0.72 K/UL — SIGNIFICANT CHANGE UP (ref 0–0.9)
MONOCYTES NFR BLD AUTO: 6.9 % — SIGNIFICANT CHANGE UP (ref 2–14)
NEUTROPHILS # BLD AUTO: 7.34 K/UL — SIGNIFICANT CHANGE UP (ref 1.8–7.4)
NEUTROPHILS NFR BLD AUTO: 70.1 % — SIGNIFICANT CHANGE UP (ref 43–77)
NITRITE UR-MCNC: NEGATIVE — SIGNIFICANT CHANGE UP
NRBC # BLD: 0 /100 WBCS — SIGNIFICANT CHANGE UP (ref 0–0)
PH UR: 5 — SIGNIFICANT CHANGE UP (ref 5–8)
PLATELET # BLD AUTO: 417 K/UL — HIGH (ref 150–400)
POTASSIUM SERPL-MCNC: 3.5 MMOL/L — SIGNIFICANT CHANGE UP (ref 3.5–5.3)
POTASSIUM SERPL-SCNC: 3.5 MMOL/L — SIGNIFICANT CHANGE UP (ref 3.5–5.3)
PROT SERPL-MCNC: 7.5 G/DL — SIGNIFICANT CHANGE UP (ref 6–8.3)
PROT UR-MCNC: 30 MG/DL
PROTHROM AB SERPL-ACNC: 11.1 SEC — SIGNIFICANT CHANGE UP (ref 10.5–13.4)
RBC # BLD: 4.38 M/UL — SIGNIFICANT CHANGE UP (ref 3.8–5.2)
RBC # FLD: 12.9 % — SIGNIFICANT CHANGE UP (ref 10.3–14.5)
RBC CASTS # UR COMP ASSIST: ABNORMAL /HPF (ref 0–2)
SODIUM SERPL-SCNC: 130 MMOL/L — LOW (ref 135–145)
SP GR SPEC: 1.01 — SIGNIFICANT CHANGE UP (ref 1.01–1.02)
TROPONIN I, HIGH SENSITIVITY RESULT: 8.8 NG/L — SIGNIFICANT CHANGE UP
TSH SERPL-MCNC: 2.78 UU/ML — SIGNIFICANT CHANGE UP (ref 0.34–4.82)
UROBILINOGEN FLD QL: NEGATIVE — SIGNIFICANT CHANGE UP
WBC # BLD: 10.46 K/UL — SIGNIFICANT CHANGE UP (ref 3.8–10.5)
WBC # FLD AUTO: 10.46 K/UL — SIGNIFICANT CHANGE UP (ref 3.8–10.5)
WBC UR QL: SIGNIFICANT CHANGE UP /HPF (ref 0–5)

## 2023-07-09 PROCEDURE — 71045 X-RAY EXAM CHEST 1 VIEW: CPT

## 2023-07-09 PROCEDURE — 81001 URINALYSIS AUTO W/SCOPE: CPT

## 2023-07-09 PROCEDURE — 71045 X-RAY EXAM CHEST 1 VIEW: CPT | Mod: 26

## 2023-07-09 PROCEDURE — 80053 COMPREHEN METABOLIC PANEL: CPT

## 2023-07-09 PROCEDURE — 87086 URINE CULTURE/COLONY COUNT: CPT

## 2023-07-09 PROCEDURE — 84443 ASSAY THYROID STIM HORMONE: CPT

## 2023-07-09 PROCEDURE — 99285 EMERGENCY DEPT VISIT HI MDM: CPT | Mod: 25

## 2023-07-09 PROCEDURE — 85025 COMPLETE CBC W/AUTO DIFF WBC: CPT

## 2023-07-09 PROCEDURE — 36415 COLL VENOUS BLD VENIPUNCTURE: CPT

## 2023-07-09 PROCEDURE — 85730 THROMBOPLASTIN TIME PARTIAL: CPT

## 2023-07-09 PROCEDURE — 85610 PROTHROMBIN TIME: CPT

## 2023-07-09 PROCEDURE — 93005 ELECTROCARDIOGRAM TRACING: CPT

## 2023-07-09 PROCEDURE — 84484 ASSAY OF TROPONIN QUANT: CPT

## 2023-07-09 PROCEDURE — 99285 EMERGENCY DEPT VISIT HI MDM: CPT

## 2023-07-09 RX ORDER — SODIUM CHLORIDE 9 MG/ML
1000 INJECTION INTRAMUSCULAR; INTRAVENOUS; SUBCUTANEOUS ONCE
Refills: 0 | Status: COMPLETED | OUTPATIENT
Start: 2023-07-09 | End: 2023-07-09

## 2023-07-09 RX ADMIN — SODIUM CHLORIDE 1000 MILLILITER(S): 9 INJECTION INTRAMUSCULAR; INTRAVENOUS; SUBCUTANEOUS at 16:20

## 2023-07-09 NOTE — ED ADULT NURSE NOTE - NSFALLUNIVINTERV_ED_ALL_ED
Bed/Stretcher in lowest position, wheels locked, appropriate side rails in place/Call bell, personal items and telephone in reach/Instruct patient to call for assistance before getting out of bed/chair/stretcher/Non-slip footwear applied when patient is off stretcher/[place] to call system/Physically safe environment - no spills, clutter or unnecessary equipment/Purposeful proactive rounding/Room/bathroom lighting operational, light cord in reach

## 2023-07-09 NOTE — ED PROVIDER NOTE - PATIENT PORTAL LINK FT
You can access the FollowMyHealth Patient Portal offered by [facility] by registering at the following website: [email or web address]. By joining [email or web address]’s FollowMyHealth portal, you will also be able to view your health information using other applications (apps) compatible with our system.

## 2023-07-09 NOTE — ED PROVIDER NOTE - PROGRESS NOTE DETAILS
patient lab wnl. endorses feeling well. symptoms resolved. clinically stable. patient and family instructed to fu pmd. hemodynamically stable on dc. state understanding to rubia her cardiologist

## 2023-07-09 NOTE — ED PROVIDER NOTE - OBJECTIVE STATEMENT
Patient is a 79 y/o female with a pertinent PMHx of diabetes and HTN presents to the ED c/o several days of chest pain associated with dizziness and weakness. Patient denies urinary frequency, diarrhea, fever, cough, and all other acute complaints.

## 2023-07-09 NOTE — ED ADULT TRIAGE NOTE - LOCATION:
Much of the documentation for this visit was completed in the [facility] system. Please see the attached documentation for further details about this patient's care.     Lexus Bean RN       Right arm;

## 2023-07-09 NOTE — ED PROVIDER NOTE - CLINICAL SUMMARY MEDICAL DECISION MAKING FREE TEXT BOX
Patient presenting for cp, endorse urinary frequency. vital stable. ekg sinus. will obtain lab, ua, assess for acs, uti. assess hgb, assess lytes. ed obs and reassess

## 2023-07-11 LAB
CULTURE RESULTS: SIGNIFICANT CHANGE UP
SPECIMEN SOURCE: SIGNIFICANT CHANGE UP

## 2023-08-22 NOTE — DIETITIAN INITIAL EVALUATION ADULT - PROBLEM/PLAN-1
[id number]    Goal Outcome Evaluation:    FOCUS/GOAL  Medication management and Medical management    ASSESSMENT, INTERVENTIONS AND CONTINUING PLAN FOR GOAL:  A&Ox4, A1 walker. Makes needs known.  Pt denied pain during night.  Continent of bowel and bladder, LBM 8/21.  Most recent BGs 88, 104, 76. Encouraging pt to drink applejuice.  Pt reported nausea, PRN reglan given. Continue with POC.         DISPLAY PLAN FREE TEXT

## 2024-01-08 NOTE — ED ADULT NURSE NOTE - PAIN RATING/NUMBER SCALE (0-10): ACTIVITY
Concern for acute asthma exacerbation.    Take prednisone as prescribed.  This is the regimen that is instructed to be taken 3 pills for 3 days followed by 2 pills for 3 days followed by 1 pill for 3 days.    I have sent another prescription for prednisone with instructions to take 1 pill per day for 5 days.  Do not take this regimen at this time, this is for your on hand need.    Azithromycin ordered.  I would not take this at this time.  This is only to be taken as needed for green-yellow mucus production.    Chest x-ray now to ensure no developing pneumonia.    We will restart Nucala injection today.  Sample given in office today.    LOT [id number]  EXPIRATION MAY 2026    Codeine cough syrup prescribed - to be taken only as needed for cough. This will make you drowsy, do not drive or operative heavy machinery after use. Do not take with other sedating medications. Do not mix with alcohol. Utilize fall precautions with use.     Continue Trelegy once per day.  Albuterol as needed.  I have refilled your nebulized medicines as well.    Continue current prescription medication regiment. Keep follow up appointment as scheduled. Please call the office if you have any questions or concerns.      10

## 2024-01-21 NOTE — PROGRESS NOTE ADULT - PROBLEM/PLAN-2
DISPLAY PLAN FREE TEXT
DISPLAY PLAN FREE TEXT
no
DISPLAY PLAN FREE TEXT

## 2024-02-13 NOTE — ED ADULT TRIAGE NOTE - NS ED NURSE BANDS TYPE
No protocol for requested medication. Medication: ONDANSETRON 4MG TABLETS   Last office visit date: Patient complains of nausea every visit but no Zofran discussed  Pharmacy: [facility] [address]    Order pended, routed to clinician for review.      Sent in zofran to pharmacy on 8/4/2023    No appointments in future noted
Name band;